# Patient Record
Sex: FEMALE | Race: BLACK OR AFRICAN AMERICAN | NOT HISPANIC OR LATINO | Employment: OTHER | RURAL
[De-identification: names, ages, dates, MRNs, and addresses within clinical notes are randomized per-mention and may not be internally consistent; named-entity substitution may affect disease eponyms.]

---

## 2021-01-06 ENCOUNTER — HISTORICAL (OUTPATIENT)
Dept: ADMINISTRATIVE | Facility: HOSPITAL | Age: 56
End: 2021-01-06

## 2021-01-06 LAB — SARS-COV+SARS-COV-2 AG RESP QL IA.RAPID: NEGATIVE

## 2021-01-25 ENCOUNTER — HISTORICAL (OUTPATIENT)
Dept: ADMINISTRATIVE | Facility: HOSPITAL | Age: 56
End: 2021-01-25

## 2021-01-25 LAB — SARS-COV+SARS-COV-2 AG RESP QL IA.RAPID: NEGATIVE

## 2021-06-22 DIAGNOSIS — J45.909 ASTHMA: Primary | ICD-10-CM

## 2021-07-27 VITALS — HEIGHT: 62 IN | WEIGHT: 198 LBS | BODY MASS INDEX: 36.44 KG/M2

## 2021-07-27 RX ORDER — OXYCODONE AND ACETAMINOPHEN 5; 325 MG/1; MG/1
1 TABLET ORAL EVERY 4 HOURS PRN
COMMUNITY
Start: 2021-04-19 | End: 2023-11-15

## 2021-07-27 RX ORDER — DILTIAZEM HYDROCHLORIDE 120 MG/1
1 CAPSULE, EXTENDED RELEASE ORAL DAILY
COMMUNITY
Start: 2021-04-30 | End: 2022-01-21

## 2021-07-27 RX ORDER — METOPROLOL SUCCINATE 50 MG/1
1 TABLET, EXTENDED RELEASE ORAL 2 TIMES DAILY
COMMUNITY
Start: 2021-06-04 | End: 2022-01-21

## 2021-07-27 RX ORDER — ASPIRIN 325 MG
50000 TABLET, DELAYED RELEASE (ENTERIC COATED) ORAL
COMMUNITY
Start: 2021-03-29 | End: 2022-01-21

## 2021-07-27 RX ORDER — GABAPENTIN 100 MG/1
100 CAPSULE ORAL 3 TIMES DAILY
COMMUNITY
Start: 2021-03-23 | End: 2022-09-26 | Stop reason: SDUPTHER

## 2021-07-27 RX ORDER — DICLOFENAC SODIUM 50 MG/1
50 TABLET, DELAYED RELEASE ORAL 2 TIMES DAILY PRN
COMMUNITY
Start: 2021-04-09 | End: 2022-01-21

## 2021-07-27 RX ORDER — TIZANIDINE 4 MG/1
TABLET ORAL
COMMUNITY
Start: 2021-04-09 | End: 2023-11-15

## 2021-07-27 RX ORDER — ALBUTEROL SULFATE 90 UG/1
2 AEROSOL, METERED RESPIRATORY (INHALATION) 4 TIMES DAILY PRN
COMMUNITY
Start: 2021-06-04 | End: 2022-08-01 | Stop reason: SDUPTHER

## 2021-07-27 RX ORDER — ASPIRIN 81 MG/1
81 TABLET ORAL DAILY
COMMUNITY

## 2021-07-27 RX ORDER — NORTRIPTYLINE HYDROCHLORIDE 25 MG/1
25 CAPSULE ORAL NIGHTLY
COMMUNITY
Start: 2021-04-18 | End: 2022-04-27

## 2021-07-27 RX ORDER — LORATADINE 10 MG/1
10 TABLET ORAL DAILY
COMMUNITY
Start: 2021-04-16 | End: 2022-02-04

## 2021-07-27 RX ORDER — CHLORTHALIDONE 25 MG/1
1 TABLET ORAL DAILY
COMMUNITY
Start: 2021-06-08 | End: 2023-11-15 | Stop reason: SDUPTHER

## 2021-07-27 RX ORDER — ALBUTEROL SULFATE 0.83 MG/ML
1 SOLUTION RESPIRATORY (INHALATION)
COMMUNITY
Start: 2021-06-04 | End: 2022-12-06 | Stop reason: SDUPTHER

## 2021-07-27 RX ORDER — AMLODIPINE BESYLATE 10 MG/1
10 TABLET ORAL DAILY
COMMUNITY
Start: 2021-04-18 | End: 2022-01-21

## 2021-07-27 RX ORDER — FLUTICASONE PROPIONATE 50 MCG
SPRAY, SUSPENSION (ML) NASAL
COMMUNITY
Start: 2021-02-02 | End: 2022-05-19 | Stop reason: SDUPTHER

## 2021-07-27 RX ORDER — LECITHIN 1200 MG
2 CAPSULE ORAL 2 TIMES DAILY
COMMUNITY
End: 2022-01-21

## 2021-07-27 RX ORDER — CETIRIZINE HYDROCHLORIDE 10 MG/1
1 TABLET ORAL DAILY PRN
COMMUNITY
Start: 2021-05-11 | End: 2022-07-11 | Stop reason: SDUPTHER

## 2021-07-27 RX ORDER — FLECAINIDE ACETATE 50 MG/1
50 TABLET ORAL EVERY 12 HOURS
COMMUNITY
Start: 2021-04-05 | End: 2022-01-21

## 2021-07-27 RX ORDER — LOSARTAN POTASSIUM 100 MG/1
1 TABLET ORAL DAILY
COMMUNITY
Start: 2021-06-08 | End: 2022-05-13

## 2021-07-27 RX ORDER — MONTELUKAST SODIUM 10 MG/1
1 TABLET ORAL DAILY
COMMUNITY
Start: 2021-06-04 | End: 2022-02-04

## 2021-07-27 RX ORDER — FERROUS SULFATE 324(65)MG
324 TABLET, DELAYED RELEASE (ENTERIC COATED) ORAL DAILY
COMMUNITY
End: 2022-05-19 | Stop reason: SDUPTHER

## 2021-08-04 ENCOUNTER — TELEPHONE (OUTPATIENT)
Dept: PULMONOLOGY | Facility: CLINIC | Age: 56
End: 2021-08-04

## 2021-09-01 ENCOUNTER — TELEPHONE (OUTPATIENT)
Dept: PULMONOLOGY | Facility: CLINIC | Age: 56
End: 2021-09-01

## 2022-01-21 ENCOUNTER — OFFICE VISIT (OUTPATIENT)
Dept: FAMILY MEDICINE | Facility: CLINIC | Age: 57
End: 2022-01-21

## 2022-01-21 VITALS
OXYGEN SATURATION: 100 % | DIASTOLIC BLOOD PRESSURE: 71 MMHG | HEIGHT: 62 IN | RESPIRATION RATE: 19 BRPM | HEART RATE: 76 BPM | WEIGHT: 184.25 LBS | TEMPERATURE: 97 F | BODY MASS INDEX: 33.9 KG/M2 | SYSTOLIC BLOOD PRESSURE: 123 MMHG

## 2022-01-21 DIAGNOSIS — I10 HYPERTENSION, UNSPECIFIED TYPE: ICD-10-CM

## 2022-01-21 DIAGNOSIS — E11.40 TYPE 2 DIABETES MELLITUS WITH DIABETIC NEUROPATHY, UNSPECIFIED WHETHER LONG TERM INSULIN USE: Primary | ICD-10-CM

## 2022-01-21 DIAGNOSIS — M06.9 RHEUMATOID ARTHRITIS, INVOLVING UNSPECIFIED SITE, UNSPECIFIED WHETHER RHEUMATOID FACTOR PRESENT: ICD-10-CM

## 2022-01-21 DIAGNOSIS — G62.9 NEUROPATHY: ICD-10-CM

## 2022-01-21 DIAGNOSIS — G47.33 OSA (OBSTRUCTIVE SLEEP APNEA): ICD-10-CM

## 2022-01-21 LAB
ALBUMIN SERPL BCP-MCNC: 3.4 G/DL (ref 3.5–5)
ALBUMIN/GLOB SERPL: 1 {RATIO}
ALP SERPL-CCNC: 94 U/L (ref 46–118)
ALT SERPL W P-5'-P-CCNC: 38 U/L (ref 13–56)
ANION GAP SERPL CALCULATED.3IONS-SCNC: 9 MMOL/L (ref 7–16)
AST SERPL W P-5'-P-CCNC: 34 U/L (ref 15–37)
BILIRUB SERPL-MCNC: 0.3 MG/DL (ref 0–1.2)
BUN SERPL-MCNC: 16 MG/DL (ref 7–18)
BUN/CREAT SERPL: 15 (ref 6–20)
CALCIUM SERPL-MCNC: 9.3 MG/DL (ref 8.5–10.1)
CHLORIDE SERPL-SCNC: 103 MMOL/L (ref 98–107)
CHOLEST SERPL-MCNC: 220 MG/DL (ref 0–200)
CHOLEST/HDLC SERPL: 5.4 {RATIO}
CO2 SERPL-SCNC: 32 MMOL/L (ref 21–32)
CREAT SERPL-MCNC: 1.04 MG/DL (ref 0.55–1.02)
GLOBULIN SER-MCNC: 3.4 G/DL (ref 2–4)
GLUCOSE SERPL-MCNC: 109 MG/DL (ref 74–106)
HDLC SERPL-MCNC: 41 MG/DL (ref 40–60)
LDLC SERPL CALC-MCNC: 140 MG/DL
LDLC/HDLC SERPL: 3.4 {RATIO}
NONHDLC SERPL-MCNC: 179 MG/DL
POTASSIUM SERPL-SCNC: 3.1 MMOL/L (ref 3.5–5.1)
PROT SERPL-MCNC: 6.8 G/DL (ref 6.4–8.2)
SODIUM SERPL-SCNC: 141 MMOL/L (ref 136–145)
TRIGL SERPL-MCNC: 193 MG/DL (ref 35–150)
VLDLC SERPL-MCNC: 39 MG/DL

## 2022-01-21 PROCEDURE — 80061 LIPID PANEL: CPT | Mod: ,,, | Performed by: CLINICAL MEDICAL LABORATORY

## 2022-01-21 PROCEDURE — 99204 OFFICE O/P NEW MOD 45 MIN: CPT | Mod: ,,, | Performed by: NURSE PRACTITIONER

## 2022-01-21 PROCEDURE — 99204 PR OFFICE/OUTPT VISIT, NEW, LEVL IV, 45-59 MIN: ICD-10-PCS | Mod: ,,, | Performed by: NURSE PRACTITIONER

## 2022-01-21 PROCEDURE — 36415 COLL VENOUS BLD VENIPUNCTURE: CPT | Mod: ,,, | Performed by: CLINICAL MEDICAL LABORATORY

## 2022-01-21 PROCEDURE — 83036 HEMOGLOBIN A1C: ICD-10-PCS | Mod: 90,,, | Performed by: CLINICAL MEDICAL LABORATORY

## 2022-01-21 PROCEDURE — 80053 COMPREHENSIVE METABOLIC PANEL: ICD-10-PCS | Mod: ,,, | Performed by: CLINICAL MEDICAL LABORATORY

## 2022-01-21 PROCEDURE — 80061 LIPID PANEL: ICD-10-PCS | Mod: ,,, | Performed by: CLINICAL MEDICAL LABORATORY

## 2022-01-21 PROCEDURE — 83036 HEMOGLOBIN GLYCOSYLATED A1C: CPT | Mod: 90,,, | Performed by: CLINICAL MEDICAL LABORATORY

## 2022-01-21 PROCEDURE — 80053 COMPREHEN METABOLIC PANEL: CPT | Mod: ,,, | Performed by: CLINICAL MEDICAL LABORATORY

## 2022-01-21 PROCEDURE — 36415 PR COLLECTION VENOUS BLOOD,VENIPUNCTURE: ICD-10-PCS | Mod: ,,, | Performed by: CLINICAL MEDICAL LABORATORY

## 2022-01-21 RX ORDER — CARVEDILOL 6.25 MG/1
1 TABLET ORAL 2 TIMES DAILY
COMMUNITY
Start: 2021-12-26 | End: 2023-11-15

## 2022-01-21 RX ORDER — FLUTICASONE PROPIONATE AND SALMETEROL 250; 50 UG/1; UG/1
POWDER RESPIRATORY (INHALATION)
COMMUNITY
Start: 2021-11-30 | End: 2022-01-21

## 2022-01-21 RX ORDER — HUMAN INSULIN 100 [IU]/ML
INJECTION, SUSPENSION SUBCUTANEOUS
COMMUNITY
Start: 2022-01-10 | End: 2022-02-04 | Stop reason: SDUPTHER

## 2022-01-21 NOTE — PATIENT INSTRUCTIONS
"Patient Education       Low Blood Sugar in People Without Diabetes   The Basics   Written by the doctors and editors at [facility]   What is low blood sugar? -- Low blood sugar is a condition that happens when the level of sugar in a person's blood gets too low. Some symptoms of low blood sugar are mild, such as sweating or feeling hungry. Others are severe, such as passing out. Another word for low blood sugar is "hypoglycemia."  Low blood sugar happens most often in people with diabetes. It is uncommon in people who do not have diabetes. People without diabetes might feel like they have low blood sugar sometimes, but they most likely don't.  What causes low blood sugar in people without diabetes? -- Causes of low blood sugar in people without diabetes include:  · Certain medicines  · Drinking alcohol, especially drinking a lot over a few days  · Certain illnesses that affect the liver or kidneys  · Anorexia nervosa - This is an eating disorder that makes people lose more weight than is healthy.  · Growths or problems in the pancreas - The pancreas is an organ that makes hormones and juices that help the body break down food (figure 1).  · As a side effect of weight loss surgery  · Hormone conditions that some babies are born with  What are the symptoms of low blood sugar? -- Symptoms of low blood sugar can include:  · Sweating or shaking  · Feeling hungry  · Feeling worried  If low blood sugar levels are not treated, severe symptoms can occur. These can include:  · A headache, blurry vision, or feeling dizzy  · Feeling weak or having trouble walking  · Acting confused or not thinking clearly  · Passing out  Some people have symptoms within a few hours of eating a meal. Other people have symptoms when they haven't eaten for many hours.  How do I know if I have low blood sugar? -- To be diagnosed with low blood sugar, you must meet certain conditions. You must:  · Have symptoms of low blood sugar  · Have a low blood " sugar level when you have the symptoms   · Feel better after you eat something that raises your blood sugar level to normal  To check if you meet these conditions, your doctor will do blood tests when you have symptoms of low blood sugar. Your doctor might have you do things to bring on your symptoms so he or she can do the tests. For example, he or she might have you stop eating for a certain amount of time.  After your doctor knows for sure that you get low blood sugar, he or she will look for the cause. To do this, he or she might order tests, including:  · Other blood tests  · A CT scan, MRI scan, or ultrasound - These are imaging tests that can create pictures of the inside of the body.  How is low blood sugar treated? -- Treatment for low blood sugar involves both raising your blood sugar and treating the cause of your low blood sugar.  Your doctor or nurse will teach you how you can raise your blood sugar when it gets low. People usually raise their blood sugar by eating or drinking quick sources of sugar (table 1). Depending on your condition, your doctor might recommend that you carry a quick source of sugar with you at all times in case you need it.  If your blood sugar is so low that you are confused or passing out, and you are not able to eat anything, you will need to be treated with glucagon. Glucagon is a hormone that can quickly raise blood sugar levels and stop severe symptoms. It comes as a shot (figure 2 and picture 1) or a nose spray. If your doctor recommends that you carry glucagon with you, he or she will tell you when and how to use it. Family members should also learn how to give you glucagon. That way a family member can give it to you if you can't do it yourself.  Your doctor will also treat the condition that's causing your low blood sugar, if it can be treated. For example, if a medicine is causing your low blood sugar, your doctor can change or stop your medicine. If you have a growth  "in your pancreas, your doctor might do surgery to remove the growth.  When should I go to a hospital or call for an ambulance? -- A family member or friend should take you to a hospital or call for an ambulance (in the [place] and [place], dial 9-1-1) if you:  · Still have low blood sugar after you have had a quick source of sugar  · Are still confused 15 minutes after being treated with a dose of glucagon  · Have passed out and there is no glucagon nearby  If you have low blood sugar, do not try to drive yourself to the hospital. Driving with low blood sugar can be dangerous. Once you are in the hospital or ambulance, you will be given fluids with sugar into your vein. This treatment will raise your blood sugar level immediately.  All topics are updated as new evidence becomes available and our peer review process is complete.  This topic retrieved from [employer] on: Sep 21, 2021.  Topic 67156 Version 8.0  Release: 29.4.2 - C29.263  © 2021 UpToDate, Inc. and/or its affiliates. All rights reserved.  figure 1: Pancreas     The pancreas is an organ that is found behind the stomach. It makes hormones and juices that help the body break down food.   Graphic 67467 Version 5.0    table 1: Quick sources of sugar to treat low blood sugar  3 or 4 glucose tablets   ½ cup of juice or regular soda (not sugar-free)   2 tablespoons of raisins   4 or 5 saltine crackers   1 tablespoon of sugar   1 tablespoon of honey or corn syrup   6 to 8 hard candies   These sources of sugar act quickly to treat low blood sugar levels.  Graphic 95350 Version 1.0  figure 2: Where to give a glucagon shot     A glucagon shot can be given in the buttock, arm, or thigh (as shown by the shaded areas). If you use a prefilled glucagon "pen," it can also be injected into the lower belly.  Graphic 02567 Version 7.0    picture 1: Glucagon shot     A glucagon kit includes a syringe and needle and a carrying case. Some kits come with the syringe already filled with " a dose of glucagon. Others come with a vial of glucagon powder and instructions for preparing it.  When you are ready to give the shot, hold the syringe at a 90 degree angle to the skin. Stick the needle in the skin and push the plunger down to inject the liquid. Press down lightly on the skin where the shot went in.  If you give another person a glucagon shot, you should then turn them on their side. This is to prevent choking if the person vomits.  Graphic 85573 Version 8.0    Consumer Information Use and Disclaimer   This information is not specific medical advice and does not replace information you receive from your health care provider. This is only a brief summary of general information. It does NOT include all information about conditions, illnesses, injuries, tests, procedures, treatments, therapies, discharge instructions or life-style choices that may apply to you. You must talk with your health care provider for complete information about your health and treatment options. This information should not be used to decide whether or not to accept your health care provider's advice, instructions or recommendations. Only your health care provider has the knowledge and training to provide advice that is right for you. The use of this information is governed by the [employer] End User License Agreement, available at https://www.[email or web address]/solutions/Scandlines/about/libertad.The use of [employer] content is governed by the [employer] Terms of Use. ©2021 UpToDate, Inc. All rights reserved.  Copyright   © 2021 UpToDate, Inc. and/or its affiliates. All rights reserved.  Patient Education       Diabetes and Diet   The Basics   Written by the doctors and editors at [employer]   Why is diet important in diabetes? -- Diet is important because it is part of diabetes treatment. Many people need to change what they eat and how much they eat to help treat their diabetes. It is important for people to treat their diabetes so  "that they:  · Keep their blood sugar at or near a normal level  · Prevent long-term problems, such as heart or kidney problems, that can happen in people with diabetes  Changing your diet can also help treat obesity, high blood pressure, and high cholesterol. These conditions can affect people with diabetes and can lead to future problems, such as heart attacks or strokes.  Who will work with me to change my diet? -- Your doctor or nurse will work with you to make a food plan to change your diet. They might also recommend that you work with a "dietitian." A dietitian is an expert on food and eating.  Do I need to eat at the same times every day? -- When and how often you should eat depends, in part, on the diabetes medicines you take. For example:  · People who take about the same amount of insulin at the same time each day (called a "fixed regimen") should eat meals at the same times. This is also true for people who take pills that increase insulin levels, such as sulfonylureas. Eating meals at the same time every day helps prevent low blood sugar.  · People who adjust the dose and timing of their insulin each day (called a "flexible regimen") do not always have to eat meals at the same time. That's because they can time their insulin dose for before they plan to eat, and also adjust the dose for how much they plan to eat.  · People who take medicines that don't usually cause low blood sugar, such as metformin, don't have to eat meals at the same time every day.  What do I need to think about when planning what to eat? -- Our bodies break down the food we eat into small pieces called carbohydrates, proteins, and fats.  When planning what to eat, people with diabetes need to think about:  · Carbohydrates (or "carbs") - Carbohydrates, which are sugars that our bodies use for energy, can raise a person's blood sugar level. Your doctor, nurse, or dietitian will tell you how many carbohydrates you should eat at each " "meal or snack. Foods that have carbohydrates include:  ? Bread, pasta, and rice  ? Vegetables and fruits  ? Dairy foods  ? Foods and drinks with added sugar  It is best to get your carbohydrates from fruits, vegetables, whole grains, and low-fat milk. It is best to avoid drinks with added sugar, like soda, juices, and sports drinks.   · Protein - Your doctor, nurse, or dietitian will tell you how much protein you should eat each day. It is best to eat lean meats, fish, eggs, beans, peas, soy products, nuts, and seeds.  · Fats - The type of fat you eat is more important than the amount of fat. "Saturated" and "trans" fats can increase your risk for heart problems, like a heart attack.  ? Foods that have saturated fats include meat, butter, cheese, and ice cream.  ? Foods that have trans fats include processed food with "partially hydrogenated oils" on the ingredient list. This may include fried foods, store bought cookies, muffins, pies, and cakes.  "Monounsaturated" and "polyunsaturated" fats are better for you. Foods with these types of fat include fish, avocado, olive oil, and nuts.  · Calories - People need to eat a certain amount of calories each day to keep their weight the same. People who are overweight and want to lose weight need to eat fewer calories each day.  · Fiber - Eating foods with a lot of fiber can help control a person's blood sugar level. Foods that have a lot of fiber include apples, green beans, peas, beans, lentils, nuts, oatmeal, and whole grains.  · Salt - People who have high blood pressure should not eat foods that contain a lot of salt (also called sodium). People with high blood pressure should also eat healthy foods, such as fruits, vegetables, and low-fat dairy foods.  · Alcohol - Having more than 1 drink (for women) or 2 drinks (for men) a day can raise blood sugar levels. Also, drinks that have fruit juice or soda in them can raise blood sugar levels.  What can I do if I need to " lose weight? -- If you need to lose weight, you can:  · Exercise - Try to get at least 30 minutes of physical activity a day, most days of the week. Even gentle exercise, like walking, is good for your health. Some people with diabetes need to change their medicine dose before they exercise. They might also need to check their blood sugar levels before and after exercising.  · Eat fewer calories - Your doctor, nurse, or dietitian can tell you how many calories you should eat each day in order to lose weight.  If you are worried about your weight, size, or shape, talk with your doctor, nurse, or dietitian. They can help you make changes to improve your health.  Can I eat the same foods as my family? -- Yes. You do not need to eat special foods if you have diabetes. You and your family can eat the same foods. Changing your diet is mostly about eating healthy foods and not eating too much.  What are the other parts of diabetes treatment? -- Besides changing your diet, the other parts of diabetes treatment are:  · Exercise  · Medicines  Some people with diabetes need to learn how to match their diet and exercise with their medicine dose. For example, people who use insulin might need to choose the dose of insulin they give themselves. To choose their dose, they need to think about:  · What they plan to eat at the next meal  · How much exercise they plan to do  · What their blood sugar level is  If the diet and exercise do not match the medicine dose, a person's blood sugar level can get too low or too high. Blood sugar levels that are too low or too high can cause problems.  All topics are updated as new evidence becomes available and our peer review process is complete.  This topic retrieved from [employer] on: Sep 21, 2021.  Topic 09389 Version 7.0  Release: 29.4.2 - C29.263  © 2021 UpToDate, Inc. and/or its affiliates. All rights reserved.  Consumer Information Use and Disclaimer   This information is not specific  medical advice and does not replace information you receive from your health care provider. This is only a brief summary of general information. It does NOT include all information about conditions, illnesses, injuries, tests, procedures, treatments, therapies, discharge instructions or life-style choices that may apply to you. You must talk with your health care provider for complete information about your health and treatment options. This information should not be used to decide whether or not to accept your health care provider's advice, instructions or recommendations. Only your health care provider has the knowledge and training to provide advice that is right for you. The use of this information is governed by the [employer] End User License Agreement, available at https://www.[email or web address]/en/solutions/arcbazar.com/about/libertad.The use of [employer] content is governed by the [employer] Terms of Use. ©2021 UpToDate, Inc. All rights reserved.  Copyright   © 2021 UpToDate, Inc. and/or its affiliates. All rights reserved.

## 2022-01-21 NOTE — PROGRESS NOTES
ROGELIO Pacheco   [facility]      PATIENT NAME: Jana Lopez  : 1965  DATE: 22  MRN: [id number]      Patient PCP Information     Provider PCP Type    Adri FOFANA ROG[facility]IO [facility]          Reason for Visit / Chief Complaint: Diabetes (Pt follow up with diabetes and hospital follow up )     History of Present Illness / Problem Focused Workflow     Jana Lopez presents to the clinic with Diabetes (Pt follow up with diabetes and hospital follow up )     HPI   Hospitalized 2022. Patient was on steroids for RA. Glucose 1000 on admission. Patient hydrated tx with SSI  DC home on 70/30 BID.    Patient has been off Prednisone since a week before she was admitted to hospital.   Patient checking accuchecks at home. Currently on 70/30 25 units every am 20 units every pm   200 this am, 193 this pm.   Records requested from hospitalization   Cardiologist Dr Susanna TEJADA  Rhe[facility]  Dr Yañez eye appt schedule for 2022  [facility]    Review of Systems     Review of Systems   Constitutional: Negative.    HENT: Negative.    Eyes: Positive for visual disturbance.   Respiratory: Positive for cough.    Cardiovascular: Negative.    Gastrointestinal: Negative.    Endocrine: Negative.    Genitourinary: Negative.    Musculoskeletal: Positive for myalgias.   Skin: Negative.    Allergic/Immunologic: Negative.    Neurological: Positive for numbness.   Hematological: Negative.    Psychiatric/Behavioral: Negative.        Medical / Social / Family History     Past Medical History:   Diagnosis Date    Asthma     Dyslipidemia     Hypertension     HIRA (obstructive sleep apnea)     Rheumatoid arthritis     SVT (supraventricular tachycardia)     followed at Dr.S.Hailey MALLORY       Past Surgical History:   Procedure Laterality Date    CHOLECYSTECTOMY      HYSTERECTOMY      LITHOTRIPSY         Social History  Ms.  reports that she has never smoked. She has never used smokeless tobacco. She reports  previous alcohol use. She reports that she does not use drugs.    Family History  MsKartik's family history includes Diabetes in her other; Heart disease in her father and mother; Hypertension in her daughter and father; Stroke in her other.    Medications and Allergies     Medications  Outpatient Medications Marked as Taking for the 1/21/22 encounter (Office Visit) with ROGELIO Pacheco   Medication Sig Dispense Refill    albuterol (PROVENTIL) 2.5 mg /3 mL (0.083 %) nebulizer solution Take 1 vial by nebulization every 4 to 6 hours as needed.      albuterol (PROVENTIL/VENTOLIN HFA) 90 mcg/actuation inhaler Inhale 2 puffs into the lungs 4 (four) times daily as needed.      aspirin (ECOTRIN) 81 MG EC tablet Take 81 mg by mouth once daily.      carvediloL (COREG) 6.25 MG tablet Take 1 tablet by mouth 2 (two) times a day.      cetirizine (ZYRTEC) 10 MG tablet Take 1 tablet by mouth daily as needed.      chlorthalidone (HYGROTEN) 25 MG Tab Take 1 tablet by mouth once daily.      ferrous sulfate 324 mg (65 mg iron) TbEC Take 324 mg by mouth once daily.      fluticasone propionate (FLONASE) 50 mcg/actuation nasal spray USE 1 2 SPRAYS IN EACH NOSTRIL EVERY DAY AS NEEDED      gabapentin (NEURONTIN) 100 MG capsule Take 100 mg by mouth 3 (three) times daily.      loratadine (CLARITIN) 10 mg tablet Take 10 mg by mouth once daily.      losartan (COZAAR) 100 MG tablet Take 1 tablet by mouth once daily.      montelukast (SINGULAIR) 10 mg tablet Take 1 tablet by mouth once daily.      potassium &magnesium aspartate (MAG ASPART-POTASSIUM ASPART ORAL) Take 1 capsule by mouth once daily.      tiZANidine (ZANAFLEX) 4 MG tablet TAKE 1 TABLET BY MOUTH EVERY 6 8 HOURS AS NEEDED         Allergies  Review of patient's allergies indicates:   Allergen Reactions    Lisinopril Other (See Comments)     cough       Physical Examination     Vitals:    01/21/22 1527   BP: 123/71   BP Location: Right arm   Patient Position: Sitting  "  Pulse: 76   Resp: 19   Temp: 97.3 °F (36.3 °C)   SpO2: 100%   Weight: 83.6 kg (184 lb 4 oz)   Height: 5' 2" (1.575 m)     DIABETIC FOOT EXAM:  normal DP and PT pulses, no trophic changes or ulcerative lesions, normal sensory exam and normal monofilament exam,   nail exam normal nails without lesions      Physical Exam  Vitals reviewed.   Constitutional:       Appearance: Normal appearance. She is obese.   HENT:      Head: Normocephalic.      Right Ear: External ear normal.      Left Ear: External ear normal.      Nose: Nose normal.      Mouth/Throat:      Mouth: Mucous membranes are moist.   Eyes:      Extraocular Movements: Extraocular movements intact.      Conjunctiva/sclera: Conjunctivae normal.      Pupils: Pupils are equal, round, and reactive to light.      Comments: Arcus senilis   Cardiovascular:      Rate and Rhythm: Normal rate and regular rhythm.      Pulses: Normal pulses.      Heart sounds: Normal heart sounds.   Pulmonary:      Effort: Pulmonary effort is normal.      Breath sounds: Normal breath sounds.   Abdominal:      Palpations: Abdomen is soft.   Musculoskeletal:         General: Normal range of motion.      Cervical back: Normal range of motion.   Skin:     General: Skin is warm and dry.      Capillary Refill: Capillary refill takes less than 2 seconds.   Neurological:      General: No focal deficit present.      Mental Status: She is alert and oriented to person, place, and time.      Sensory: Sensory deficit present.      Comments: Neuropathy reported. Sensation evaluation intact   Psychiatric:         Mood and Affect: Mood normal.         Behavior: Behavior normal.         Thought Content: Thought content normal.         Judgment: Judgment normal.           No visits with results within 14 Day(s) from this visit.   Latest known visit with results is:   Historical on 01/25/2021   Component Date Value Ref Range Status    COVID-19 Ag 01/25/2021 Negative  Negative Final    Comment: Negative " SARS-CoV results should not be used as the sole basis fortreatment or patient management decisions; negative results should beconsidered in the context of a patient's recent exposures, history andthe presence of clinical signs and symptoms   consistent withCOVID-19.Negative results should be treated as presumptive and confirmedby molecular assay, if necessary for patient management.The above 1 analytes were performed by [facility] [id number] [place] [address]               Assessment and Plan (including Health Maintenance)     Plan:   Type 2 diabetes mellitus with diabetic neuropathy, unspecified whether long term insulin use  -     Comprehensive Metabolic Panel; Future; Expected date: 01/21/2022  -     Lipid Panel; Future; Expected date: 01/21/2022  -     Hemoglobin A1C; Future; Expected date: 01/21/2022  -     Increase 70/30 pm dose to 20 units, keep 25 units every am  -     Ada diet  -     Log glucose readings at home. Bring to next appointment     Rheumatoid arthritis, involving unspecified site, unspecified whether rheumatoid factor present        -    Follow up with Dr Urrutia in March  Or earlier if able         -    Request records from [facility]     Hypertension, unspecified type/Hx of SVT  -continue htn and rate control meds as prescribed.   -Request records from [facility]- Dr Mullins    Neuropathy  - patient has been off gabapentin. Restart 100 mg TID   -neuropathy precautions discussed     HIRA  -1 mo wo wearing machine. Tubing issues. Patient to call [employer] for evaluation of machine.   Has seen Dr Pathak in past.      >45 min spent on intake, discussion of history, evaluation, review of meds and discussion of treatment plan  New patient to establish  Care   There are no Patient Instructions on file for this visit.       Health Maintenance Due   Topic Date Due    Hepatitis C Screening  Never done    Lipid Panel  Never done    COVID-19 Vaccine (1) Never done    HIV Screening   Never done    TETANUS VACCINE  Never done    Mammogram  Never done    Colorectal Cancer Screening  Never done    Shingles Vaccine (1 of 2) Never done    Influenza Vaccine (1) Never done         There is no immunization history on file for this patient.     Problem List Items Addressed This Visit        Orthopedic    Rheumatoid arthritis      Other Visit Diagnoses     Type 2 diabetes mellitus with diabetic neuropathy, unspecified whether long term insulin use    -  Primary    Relevant Medications    NOVOLIN 70-30 FLEXPEN U-100 100 unit/mL (70-30) InPn pen    Other Relevant Orders    Comprehensive Metabolic Panel    Lipid Panel    Hemoglobin A1C    Hypertension, unspecified type        Neuropathy        HIRA (obstructive sleep apnea)              The patient has no Health Maintenance topics of status Not Due    No future appointments.         Signature:  ROGELIO Pacheco  [facility]     Date of encounter: 1/21/22

## 2022-01-25 LAB — HBA1C MFR BLD: 14.7 % (ref 4–5.6)

## 2022-01-25 RX ORDER — ATORVASTATIN CALCIUM 10 MG/1
10 TABLET, FILM COATED ORAL NIGHTLY
Qty: 90 TABLET | Refills: 3 | Status: SHIPPED | OUTPATIENT
Start: 2022-01-25 | End: 2022-01-27 | Stop reason: CLARIF

## 2022-01-27 RX ORDER — SIMVASTATIN 10 MG/1
10 TABLET, FILM COATED ORAL NIGHTLY
Qty: 90 TABLET | Refills: 3 | Status: SHIPPED | OUTPATIENT
Start: 2022-01-27 | End: 2023-11-15 | Stop reason: SDUPTHER

## 2022-01-27 NOTE — PROGRESS NOTES
Patient atorvstatin high on cost. Switched to simvastatin. Patient to notify NP if not affordable.

## 2022-02-04 ENCOUNTER — OFFICE VISIT (OUTPATIENT)
Dept: FAMILY MEDICINE | Facility: CLINIC | Age: 57
End: 2022-02-04
Payer: COMMERCIAL

## 2022-02-04 VITALS
TEMPERATURE: 98 F | OXYGEN SATURATION: 99 % | SYSTOLIC BLOOD PRESSURE: 129 MMHG | RESPIRATION RATE: 18 BRPM | DIASTOLIC BLOOD PRESSURE: 84 MMHG | HEART RATE: 83 BPM | BODY MASS INDEX: 34.44 KG/M2 | HEIGHT: 62 IN | WEIGHT: 187.13 LBS

## 2022-02-04 DIAGNOSIS — F32.A DEPRESSION, UNSPECIFIED DEPRESSION TYPE: ICD-10-CM

## 2022-02-04 DIAGNOSIS — E11.40 CONTROLLED TYPE 2 DIABETES MELLITUS WITH DIABETIC NEUROPATHY, WITH LONG-TERM CURRENT USE OF INSULIN: Primary | ICD-10-CM

## 2022-02-04 DIAGNOSIS — E78.5 HYPERLIPIDEMIA, UNSPECIFIED HYPERLIPIDEMIA TYPE: ICD-10-CM

## 2022-02-04 DIAGNOSIS — Z79.4 CONTROLLED TYPE 2 DIABETES MELLITUS WITH DIABETIC NEUROPATHY, WITH LONG-TERM CURRENT USE OF INSULIN: Primary | ICD-10-CM

## 2022-02-04 DIAGNOSIS — I10 HYPERTENSION, UNSPECIFIED TYPE: ICD-10-CM

## 2022-02-04 PROCEDURE — 3008F PR BODY MASS INDEX (BMI) DOCUMENTED: ICD-10-PCS | Mod: CPTII,,, | Performed by: NURSE PRACTITIONER

## 2022-02-04 PROCEDURE — 99213 PR OFFICE/OUTPT VISIT, EST, LEVL III, 20-29 MIN: ICD-10-PCS | Mod: ,,, | Performed by: NURSE PRACTITIONER

## 2022-02-04 PROCEDURE — 1159F MED LIST DOCD IN RCRD: CPT | Mod: CPTII,,, | Performed by: NURSE PRACTITIONER

## 2022-02-04 PROCEDURE — 3074F PR MOST RECENT SYSTOLIC BLOOD PRESSURE < 130 MM HG: ICD-10-PCS | Mod: CPTII,,, | Performed by: NURSE PRACTITIONER

## 2022-02-04 PROCEDURE — 3079F DIAST BP 80-89 MM HG: CPT | Mod: CPTII,,, | Performed by: NURSE PRACTITIONER

## 2022-02-04 PROCEDURE — 4010F ACE/ARB THERAPY RXD/TAKEN: CPT | Mod: CPTII,,, | Performed by: NURSE PRACTITIONER

## 2022-02-04 PROCEDURE — 3008F BODY MASS INDEX DOCD: CPT | Mod: CPTII,,, | Performed by: NURSE PRACTITIONER

## 2022-02-04 PROCEDURE — 3079F PR MOST RECENT DIASTOLIC BLOOD PRESSURE 80-89 MM HG: ICD-10-PCS | Mod: CPTII,,, | Performed by: NURSE PRACTITIONER

## 2022-02-04 PROCEDURE — 4010F PR ACE/ARB THEARPY RXD/TAKEN: ICD-10-PCS | Mod: CPTII,,, | Performed by: NURSE PRACTITIONER

## 2022-02-04 PROCEDURE — 3074F SYST BP LT 130 MM HG: CPT | Mod: CPTII,,, | Performed by: NURSE PRACTITIONER

## 2022-02-04 PROCEDURE — 1159F PR MEDICATION LIST DOCUMENTED IN MEDICAL RECORD: ICD-10-PCS | Mod: CPTII,,, | Performed by: NURSE PRACTITIONER

## 2022-02-04 PROCEDURE — 99213 OFFICE O/P EST LOW 20 MIN: CPT | Mod: ,,, | Performed by: NURSE PRACTITIONER

## 2022-02-04 RX ORDER — HUMAN INSULIN 100 [IU]/ML
INJECTION, SUSPENSION SUBCUTANEOUS
Qty: 14.1 ML | Refills: 3 | Status: SHIPPED | OUTPATIENT
Start: 2022-02-04 | End: 2022-03-21 | Stop reason: SDUPTHER

## 2022-02-04 RX ORDER — SERTRALINE HYDROCHLORIDE 25 MG/1
25 TABLET, FILM COATED ORAL DAILY
Qty: 30 TABLET | Refills: 11 | Status: SHIPPED | OUTPATIENT
Start: 2022-02-04 | End: 2023-02-28

## 2022-02-04 RX ORDER — NAPROXEN 500 MG/1
500 TABLET ORAL 2 TIMES DAILY PRN
Qty: 30 TABLET | Refills: 1 | Status: SHIPPED | OUTPATIENT
Start: 2022-02-04 | End: 2022-02-28

## 2022-02-04 NOTE — PROGRESS NOTES
ROGELIO Pacheco   [facility]      PATIENT NAME: Jana Lopez  : 1965  DATE: 22  MRN: [id number]      Patient PCP Information     Provider PCP Type    Adri CT ROGELIO Kelly General          Reason for Visit / Chief Complaint: Follow-up       History of Present Illness / Problem Focused Workflow     Jana Lopez presents to the clinic with Follow-up   Rheumatology apt bumped up to , patients pain to joint and head fullness increasing  Patients glucose is improving after review of logs. Lowest 101 Highest 300s average around 180-200   Diabetes  She presents for her follow-up diabetic visit. She has type 2 diabetes mellitus. No MedicAlert identification noted. Hypoglycemia symptoms include headaches. Pertinent negatives for hypoglycemia include no confusion, dizziness, hunger, mood changes, nervousness/anxiousness, pallor, seizures, sleepiness, speech difficulty, sweats or tremors. Associated symptoms include blurred vision, foot paresthesias and visual change. Pertinent negatives for diabetes include no chest pain, no fatigue, no foot ulcerations, no polydipsia, no polyphagia, no polyuria, no weakness and no weight loss. Pertinent negatives for hypoglycemia complications include no blackouts, no hospitalization, no nocturnal hypoglycemia, no required assistance and no required glucagon injection. Symptoms are stable. Diabetic complications include PVD and retinopathy. Pertinent negatives for diabetic complications include no autonomic neuropathy, CVA, heart disease, nephropathy or peripheral neuropathy. Risk factors for coronary artery disease include dyslipidemia, hypertension, obesity and stress. Current diabetic treatment includes diet and insulin injections. She is compliant with treatment all of the time. She is currently taking insulin pre-breakfast and pre-dinner. Insulin injections are given by patient and sibling. Rotation sites for injection include the abdominal wall  and arms. Her weight is stable. She is following a generally healthy, low fat/cholesterol and low salt diet. Meal planning includes avoidance of concentrated sweets and carbohydrate counting. She has not had a previous visit with a dietitian. She rarely participates in exercise. She monitors blood glucose at home 1-2 x per day. She monitors urine at home <1 x per month. Blood glucose monitoring compliance is excellent. Her home blood glucose trend is decreasing steadily. She does not see a podiatrist.Eye exam is current.       Review of Systems     Review of Systems   Constitutional: Negative for activity change, appetite change, chills, diaphoresis, fatigue, fever, unexpected weight change and weight loss.   HENT: Negative for congestion, ear pain, facial swelling, hearing loss, nosebleeds and sore throat.    Eyes: Positive for blurred vision.   Respiratory: Negative for apnea, cough, shortness of breath and wheezing.    Cardiovascular: Negative for chest pain, palpitations and leg swelling.   Gastrointestinal: Negative for abdominal distention, abdominal pain, blood in stool, constipation, diarrhea and nausea.   Endocrine: Negative for cold intolerance, heat intolerance, polydipsia, polyphagia and polyuria.   Genitourinary: Negative for decreased urine volume, difficulty urinating, dysuria, flank pain, frequency, hematuria and urgency.   Musculoskeletal: Positive for arthralgias. Negative for joint swelling and myalgias.   Skin: Negative for color change, pallor and rash.   Neurological: Positive for numbness and headaches. Negative for dizziness, tremors, seizures, syncope, facial asymmetry, speech difficulty, weakness and light-headedness.   Hematological: Negative for adenopathy. Does not bruise/bleed easily.   Psychiatric/Behavioral: Positive for sleep disturbance. Negative for behavioral problems and confusion. The patient is not nervous/anxious.         Depressed        Medical / Social / Family History      Past Medical History:   Diagnosis Date    Asthma     Dyslipidemia     Hypertension     HIRA (obstructive sleep apnea)     Rheumatoid arthritis     SVT (supraventricular tachycardia)     followed at [facility],        Past Surgical History:   Procedure Laterality Date    CHOLECYSTECTOMY      HYSTERECTOMY      LITHOTRIPSY         Social History  Ms.  reports that she has never smoked. She has never used smokeless tobacco. She reports previous alcohol use. She reports that she does not use drugs.    Family History  Ms.'s family history includes Diabetes in her other; Heart disease in her father and mother; Hypertension in her daughter and father; Stroke in her other.    Medications and Allergies     Medications  Outpatient Medications Marked as Taking for the 2/4/22 encounter (Office Visit) with ROGELIO Pacheco   Medication Sig Dispense Refill    albuterol (PROVENTIL) 2.5 mg /3 mL (0.083 %) nebulizer solution Take 1 vial by nebulization every 4 to 6 hours as needed.      albuterol (PROVENTIL/VENTOLIN HFA) 90 mcg/actuation inhaler Inhale 2 puffs into the lungs 4 (four) times daily as needed.      aspirin (ECOTRIN) 81 MG EC tablet Take 81 mg by mouth once daily.      carvediloL (COREG) 6.25 MG tablet Take 1 tablet by mouth 2 (two) times a day.      cetirizine (ZYRTEC) 10 MG tablet Take 1 tablet by mouth daily as needed.      chlorthalidone (HYGROTEN) 25 MG Tab Take 1 tablet by mouth once daily.      ferrous sulfate 324 mg (65 mg iron) TbEC Take 324 mg by mouth once daily.      fluticasone propionate (FLONASE) 50 mcg/actuation nasal spray USE 1 2 SPRAYS IN EACH NOSTRIL EVERY DAY AS NEEDED      gabapentin (NEURONTIN) 100 MG capsule Take 100 mg by mouth 3 (three) times daily.      losartan (COZAAR) 100 MG tablet Take 1 tablet by mouth once daily.      NOVOLIN 70-30 FLEXPEN U-100 100 unit/mL (70-30) InPn pen Inject 27 Units into the skin every morning AND 20 Units every evening. 14.1 mL 3     "potassium &magnesium aspartate (MAG ASPART-POTASSIUM ASPART ORAL) Take 1 capsule by mouth once daily.      simvastatin (ZOCOR) 10 MG tablet Take 1 tablet (10 mg total) by mouth every evening. 90 tablet 3       Allergies  Review of patient's allergies indicates:   Allergen Reactions    Lisinopril Other (See Comments)     cough       Physical Examination     Vitals:    02/04/22 1540 02/04/22 1541   BP: (!) 138/90 129/84   BP Location: Left arm Right arm   Patient Position: Sitting Sitting   Pulse: 83    Resp: 18    Temp: 98.3 °F (36.8 °C)    SpO2: 99%    Weight: 84.9 kg (187 lb 2 oz)    Height: 5' 2" (1.575 m)      PHQ-9:    Over the last 2 weeks, how often have you been bothered by any of the following problems?    1.  Little interest or pleasure in doing things: Nearly every day           = 3   2.  Feeling down, depressed or hopeless: Nearly every day           = 3   3.  Trouble falling or staying asleep, or sleeping too much: More than half of days  = 2   4.  Feeling tired or having little energy: Several days                = 1   5.  Poor appetite or overeating: Several days                = 1   6.  Feeling bad about yourself - or that you are a failure or have let yourself or your family down: Nearly every day           = 3   7.  Trouble concentrating on things, such as reading the newspaper or watching television: Several days                = 1   8.  Moving or speaking so slowly that other people could have noticed.  Or the opposite - being fidgety or restless that you have been moving around a lot more than usual: Several days                = 1   9.  Thoughts that you would be better off dead, or of hurting yourself: Nearly every day           = 3    PHQ-9 Total:  18   No thoughts of harming self of plan. Patient does feel she would be better of dead at times due to recent illnesses  Difficultly finding purpose in current state of illness   Total Score  0-4: None  5-9: Mild  10-14: Moderate  15-19: " Moderately Severe  20-27: Severe      Physical Exam  Vitals reviewed.   Constitutional:       Appearance: Normal appearance.   HENT:      Head: Normocephalic.      Right Ear: External ear normal.      Left Ear: External ear normal.      Mouth/Throat:      Mouth: Mucous membranes are moist.   Eyes:      Extraocular Movements: Extraocular movements intact.   Cardiovascular:      Rate and Rhythm: Normal rate.      Pulses: Normal pulses.      Heart sounds: Normal heart sounds.   Pulmonary:      Effort: Pulmonary effort is normal.      Breath sounds: Normal breath sounds.   Abdominal:      Palpations: Abdomen is soft.   Musculoskeletal:         General: Normal range of motion.      Cervical back: Normal range of motion.   Skin:     General: Skin is warm and dry.      Capillary Refill: Capillary refill takes less than 2 seconds.   Neurological:      General: No focal deficit present.      Mental Status: She is alert and oriented to person, place, and time.   Psychiatric:         Mood and Affect: Mood normal.         Behavior: Behavior normal.         Thought Content: Thought content normal.         Judgment: Judgment normal.           No visits with results within 14 Day(s) from this visit.   Latest known visit with results is:   Office Visit on 01/21/2022   Component Date Value Ref Range Status    Sodium 01/21/2022 141  136 - 145 mmol/L Final    Potassium 01/21/2022 3.1* 3.5 - 5.1 mmol/L Final    Chloride 01/21/2022 103  98 - 107 mmol/L Final    CO2 01/21/2022 32  21 - 32 mmol/L Final    Anion Gap 01/21/2022 9  7 - 16 mmol/L Final    Glucose 01/21/2022 109* 74 - 106 mg/dL Final    BUN 01/21/2022 16  7 - 18 mg/dL Final    Creatinine 01/21/2022 1.04* 0.55 - 1.02 mg/dL Final    BUN/Creatinine Ratio 01/21/2022 15  6 - 20 Final    Calcium 01/21/2022 9.3  8.5 - 10.1 mg/dL Final    Total Protein 01/21/2022 6.8  6.4 - 8.2 g/dL Final    Albumin 01/21/2022 3.4* 3.5 - 5.0 g/dL Final    Globulin 01/21/2022 3.4  2.0 -  4.0 g/dL Final    A/G Ratio 01/21/2022 1.0   Final    Bilirubin, Total 01/21/2022 0.3  0.0 - 1.2 mg/dL Final    Alk Phos 01/21/2022 94  46 - 118 U/L Final    ALT 01/21/2022 38  13 - 56 U/L Final    AST 01/21/2022 34  15 - 37 U/L Final    eGFR 01/21/2022 58* >=60 mL/min/1.73m² Final    Triglycerides 01/21/2022 193* 35 - 150 mg/dL Final      Normal:  <150 mg/dL  Borderline High: 150-199 mg/dL  High:   200-499 mg/dL  Very High:  >=500    Cholesterol 01/21/2022 220* 0 - 200 mg/dL Final      <200 mg/dL:  Desirable  200-240 mg/dL: Borderline High  >240 mg/dL:  High    HDL Cholesterol 01/21/2022 41  40 - 60 mg/dL Final      <40 mg/dL: Low HDL  40-60 mg/dL: Normal  >60 mg/dL: Desirable    Cholesterol/HDL Ratio (Risk Factor) 01/21/2022 5.4   Final    Non-HDL 01/21/2022 179  mg/dL Final    LDL Calculated 01/21/2022 140  mg/dL Final    Unable to calculate due to one of the following values:  Cholesterol <5  HDL Cholesterol <5  Triglycerides <10 or >400    LDL/HDL 01/21/2022 3.4   Final    Unable to calculate due to one of the following values:  Cholesterol <5  HDL Cholesterol <5  Triglycerides <10 or >400    VLDL 01/21/2022 39  mg/dL Final    Hemoglobin A1c 01/21/2022 14.7* 4.0 - 5.6 % Final    Hemoglobin A1c values greater than or equal to 6.5 percent  are diagnostic for diabetes mellitus.  Diagnosis should be   confirmed by repeat testing.  In diabetic patients, HbA1c   goals should be discussed with healthcare provider.     Test Performed by:  [facility] - [address] [address]  : Bruno Zazueta M.D. Ph.D.; CLIA# [id number]             Assessment and Plan (including Health Maintenance)         Plan:   Controlled type 2 diabetes mellitus with diabetic neuropathy, with long-term current use of insulin  -     NOVOLIN 70-30 FLEXPEN U-100 100 unit/mL (70-30) InPn pen; Inject 27 Units into the skin every morning AND 20 Units every evening.   Dispense: 14.1 mL; Refill: 3  Increased am dose by 2 units     Hypertension, unspecified type  -bp controlled continue home meds    Hyperlipidemia, unspecified hyperlipidemia type  -continue statin    Depression, unspecified depression type  PHQ9 remains elevated after restarting home TC antidepressant. Patient reports no improvement. Wean TCA. Will start zoloft once wean complete in approx 2 weeks. Trevor handout given to patient for psychotherapy eval   -     sertraline (ZOLOFT) 25 MG tablet; Take 1 tablet (25 mg total) by mouth once daily.  Dispense: 30 tablet; Refill: 11    Rheumatoid Arthritis  -     naproxen (NAPROSYN) 500 MG tablet; Take 1 tablet (500 mg total) by mouth 2 (two) times daily as needed (joint pain).  Dispense: 30 tablet; Refill: 1       -      Follow up with Rheumatology as scheduled     RTC in 1 mo for repeat eval     There are no Patient Instructions on file for this visit.       Health Maintenance Due   Topic Date Due    Hepatitis C Screening  Never done    COVID-19 Vaccine (1) Never done    HIV Screening  Never done    TETANUS VACCINE  Never done    Mammogram  Never done    Colorectal Cancer Screening  Never done    Shingles Vaccine (1 of 2) Never done    Influenza Vaccine (1) Never done         There is no immunization history on file for this patient.     Problem List Items Addressed This Visit    None     Visit Diagnoses     Controlled type 2 diabetes mellitus with diabetic neuropathy, with long-term current use of insulin    -  Primary    Relevant Medications    NOVOLIN 70-30 FLEXPEN U-100 100 unit/mL (70-30) InPn pen    Hypertension, unspecified type        Hyperlipidemia, unspecified hyperlipidemia type        Depression, unspecified depression type              Health Maintenance Topics with due status: Not Due       Topic Last Completion Date    Lipid Panel 01/21/2022       Future Appointments   Date Time Provider Department Center   3/4/2022  3:30 PM ROGELIO Pacheco  [facility]            Signature:  ROGELIO Pacheco  [facility]     Date of encounter: 2/4/22

## 2022-03-04 ENCOUNTER — OFFICE VISIT (OUTPATIENT)
Dept: FAMILY MEDICINE | Facility: CLINIC | Age: 57
End: 2022-03-04
Payer: COMMERCIAL

## 2022-03-04 VITALS
BODY MASS INDEX: 35.01 KG/M2 | SYSTOLIC BLOOD PRESSURE: 138 MMHG | HEART RATE: 82 BPM | WEIGHT: 190.25 LBS | HEIGHT: 62 IN | DIASTOLIC BLOOD PRESSURE: 79 MMHG | TEMPERATURE: 98 F | RESPIRATION RATE: 18 BRPM | OXYGEN SATURATION: 97 %

## 2022-03-04 DIAGNOSIS — E11.9 TYPE 2 DIABETES MELLITUS WITHOUT COMPLICATION, WITH LONG-TERM CURRENT USE OF INSULIN: ICD-10-CM

## 2022-03-04 DIAGNOSIS — M06.9 RHEUMATOID ARTHRITIS INVOLVING MULTIPLE SITES, UNSPECIFIED WHETHER RHEUMATOID FACTOR PRESENT: Primary | ICD-10-CM

## 2022-03-04 DIAGNOSIS — I10 HYPERTENSION, UNSPECIFIED TYPE: ICD-10-CM

## 2022-03-04 DIAGNOSIS — Z79.4 TYPE 2 DIABETES MELLITUS WITHOUT COMPLICATION, WITH LONG-TERM CURRENT USE OF INSULIN: ICD-10-CM

## 2022-03-04 PROCEDURE — 3078F PR MOST RECENT DIASTOLIC BLOOD PRESSURE < 80 MM HG: ICD-10-PCS | Mod: CPTII,,, | Performed by: NURSE PRACTITIONER

## 2022-03-04 PROCEDURE — 4010F PR ACE/ARB THEARPY RXD/TAKEN: ICD-10-PCS | Mod: CPTII,,, | Performed by: NURSE PRACTITIONER

## 2022-03-04 PROCEDURE — 4010F ACE/ARB THERAPY RXD/TAKEN: CPT | Mod: CPTII,,, | Performed by: NURSE PRACTITIONER

## 2022-03-04 PROCEDURE — 3008F BODY MASS INDEX DOCD: CPT | Mod: CPTII,,, | Performed by: NURSE PRACTITIONER

## 2022-03-04 PROCEDURE — 1159F PR MEDICATION LIST DOCUMENTED IN MEDICAL RECORD: ICD-10-PCS | Mod: CPTII,,, | Performed by: NURSE PRACTITIONER

## 2022-03-04 PROCEDURE — 3078F DIAST BP <80 MM HG: CPT | Mod: CPTII,,, | Performed by: NURSE PRACTITIONER

## 2022-03-04 PROCEDURE — 99212 PR OFFICE/OUTPT VISIT, EST, LEVL II, 10-19 MIN: ICD-10-PCS | Mod: ,,, | Performed by: NURSE PRACTITIONER

## 2022-03-04 PROCEDURE — 3075F SYST BP GE 130 - 139MM HG: CPT | Mod: CPTII,,, | Performed by: NURSE PRACTITIONER

## 2022-03-04 PROCEDURE — 3008F PR BODY MASS INDEX (BMI) DOCUMENTED: ICD-10-PCS | Mod: CPTII,,, | Performed by: NURSE PRACTITIONER

## 2022-03-04 PROCEDURE — 3075F PR MOST RECENT SYSTOLIC BLOOD PRESS GE 130-139MM HG: ICD-10-PCS | Mod: CPTII,,, | Performed by: NURSE PRACTITIONER

## 2022-03-04 PROCEDURE — 1159F MED LIST DOCD IN RCRD: CPT | Mod: CPTII,,, | Performed by: NURSE PRACTITIONER

## 2022-03-04 PROCEDURE — 99212 OFFICE O/P EST SF 10 MIN: CPT | Mod: ,,, | Performed by: NURSE PRACTITIONER

## 2022-03-04 NOTE — PROGRESS NOTES
ROGELIO Pacheco   [facility]      PATIENT NAME: Jana Lopez  : 1965  DATE: 3/4/22  MRN: [id number]      Patient PCP Information     Provider PCP Type    ROGELIO Pacheco General          Reason for Visit / Chief Complaint: Follow-up         History of Present Illness / Problem Focused Workflow     Jana Lopez presents to the clinic with Follow-up     HPI   Ms Lopez is here for follow up today for diabetes and RA. Patient was seen by Rheumatologist. Prescribed Methotrexate and folic acid. Patient has not yet started as she wanted to discuss medication prior to starting. Discussed medications prescribed. Patient has been on 27 units of 70/30 Novolin every am 20 units every pm. Patient did not bring log with her today. However reports accuchecks closer to 120. Patient instructed to bring log if able to next appt.   BP stable. HR stable. Denies polyuria, polydipsia.     Review of Systems     Review of Systems   Constitutional: Negative for activity change and unexpected weight change.   HENT: Negative for hearing loss, rhinorrhea and trouble swallowing.    Eyes: Negative for discharge and visual disturbance.   Respiratory: Negative for chest tightness and wheezing.    Cardiovascular: Negative for chest pain and palpitations.   Gastrointestinal: Negative for blood in stool, constipation, diarrhea and vomiting.   Endocrine: Negative for polydipsia and polyuria.   Genitourinary: Negative for difficulty urinating, dysuria and hematuria.   Musculoskeletal: Positive for arthralgias and neck pain. Negative for joint swelling.   Neurological: Positive for headaches. Negative for weakness.   Psychiatric/Behavioral: Positive for confusion and dysphoric mood.       Medical / Social / Family History     Past Medical History:   Diagnosis Date    Asthma     Dyslipidemia     Hypertension     HIRA (obstructive sleep apnea)     Rheumatoid arthritis     SVT (supraventricular tachycardia)      followed at [facility],        Past Surgical History:   Procedure Laterality Date    CHOLECYSTECTOMY      HYSTERECTOMY      LITHOTRIPSY         Social History  Ms.  reports that she has never smoked. She has never used smokeless tobacco. She reports previous alcohol use. She reports that she does not use drugs.    Family History  Ms.'s family history includes Diabetes in her other; Heart disease in her father and mother; Hypertension in her daughter and father; Stroke in her other.    Medications and Allergies     Medications  Outpatient Medications Marked as Taking for the 3/4/22 encounter (Office Visit) with ROGELIO Pacheco   Medication Sig Dispense Refill    aspirin (ECOTRIN) 81 MG EC tablet Take 81 mg by mouth once daily.      carvediloL (COREG) 6.25 MG tablet Take 1 tablet by mouth 2 (two) times a day.      cetirizine (ZYRTEC) 10 MG tablet Take 1 tablet by mouth daily as needed.      chlorthalidone (HYGROTEN) 25 MG Tab Take 1 tablet by mouth once daily.      ferrous sulfate 324 mg (65 mg iron) TbEC Take 324 mg by mouth once daily.      fluticasone propionate (FLONASE) 50 mcg/actuation nasal spray USE 1 2 SPRAYS IN EACH NOSTRIL EVERY DAY AS NEEDED      folic acid (FOLVITE) 1 MG tablet Take 1 mg by mouth once daily.      gabapentin (NEURONTIN) 100 MG capsule Take 100 mg by mouth 3 (three) times daily.      losartan (COZAAR) 100 MG tablet Take 1 tablet by mouth once daily.      METHOTREXATE ORAL Take by mouth once a week.      NOVOLIN 70-30 FLEXPEN U-100 100 unit/mL (70-30) InPn pen Inject 27 Units into the skin every morning AND 20 Units every evening. 14.1 mL 3    potassium &magnesium aspartate (MAG ASPART-POTASSIUM ASPART ORAL) Take 1 capsule by mouth once daily.      simvastatin (ZOCOR) 10 MG tablet Take 1 tablet (10 mg total) by mouth every evening. 90 tablet 3       Allergies  Review of patient's allergies indicates:   Allergen Reactions    Lisinopril Other (See Comments)     cough  "      Physical Examination     Vitals:    03/04/22 1538   BP: 138/79   BP Location: Right arm   Patient Position: Sitting   Pulse: 82   Resp: 18   Temp: 98 °F (36.7 °C)   SpO2: 97%   Weight: 86.3 kg (190 lb 4 oz)   Height: 5' 2" (1.575 m)       Physical Exam  Vitals reviewed.   Constitutional:       Appearance: Normal appearance.   HENT:      Head: Normocephalic.      Right Ear: External ear normal.      Left Ear: External ear normal.      Nose: Nose normal.      Mouth/Throat:      Mouth: Mucous membranes are moist.   Eyes:      Extraocular Movements: Extraocular movements intact.   Cardiovascular:      Rate and Rhythm: Normal rate and regular rhythm.      Pulses: Normal pulses.      Heart sounds: Normal heart sounds.   Pulmonary:      Effort: Pulmonary effort is normal. No respiratory distress.      Breath sounds: Normal breath sounds. No stridor. No wheezing, rhonchi or rales.   Chest:      Chest wall: No tenderness.   Abdominal:      Palpations: Abdomen is soft.   Musculoskeletal:         General: Normal range of motion.      Cervical back: Normal range of motion.   Skin:     General: Skin is warm and dry.      Capillary Refill: Capillary refill takes less than 2 seconds.   Neurological:      General: No focal deficit present.      Mental Status: She is alert.   Psychiatric:         Mood and Affect: Mood normal.         Behavior: Behavior normal.         Thought Content: Thought content normal.         Judgment: Judgment normal.           No visits with results within 14 Day(s) from this visit.   Latest known visit with results is:   Office Visit on 01/21/2022   Component Date Value Ref Range Status    Sodium 01/21/2022 141  136 - 145 mmol/L Final    Potassium 01/21/2022 3.1 (A) 3.5 - 5.1 mmol/L Final    Chloride 01/21/2022 103  98 - 107 mmol/L Final    CO2 01/21/2022 32  21 - 32 mmol/L Final    Anion Gap 01/21/2022 9  7 - 16 mmol/L Final    Glucose 01/21/2022 109 (A) 74 - 106 mg/dL Final    BUN 01/21/2022 " 16  7 - 18 mg/dL Final    Creatinine 01/21/2022 1.04 (A) 0.55 - 1.02 mg/dL Final    BUN/Creatinine Ratio 01/21/2022 15  6 - 20 Final    Calcium 01/21/2022 9.3  8.5 - 10.1 mg/dL Final    Total Protein 01/21/2022 6.8  6.4 - 8.2 g/dL Final    Albumin 01/21/2022 3.4 (A) 3.5 - 5.0 g/dL Final    Globulin 01/21/2022 3.4  2.0 - 4.0 g/dL Final    A/G Ratio 01/21/2022 1.0   Final    Bilirubin, Total 01/21/2022 0.3  0.0 - 1.2 mg/dL Final    Alk Phos 01/21/2022 94  46 - 118 U/L Final    ALT 01/21/2022 38  13 - 56 U/L Final    AST 01/21/2022 34  15 - 37 U/L Final    eGFR 01/21/2022 58 (A) >=60 mL/min/1.73m² Final    Triglycerides 01/21/2022 193 (A) 35 - 150 mg/dL Final      Normal:  <150 mg/dL  Borderline High: 150-199 mg/dL  High:   200-499 mg/dL  Very High:  >=500    Cholesterol 01/21/2022 220 (A) 0 - 200 mg/dL Final      <200 mg/dL:  Desirable  200-240 mg/dL: Borderline High  >240 mg/dL:  High    HDL Cholesterol 01/21/2022 41  40 - 60 mg/dL Final      <40 mg/dL: Low HDL  40-60 mg/dL: Normal  >60 mg/dL: Desirable    Cholesterol/HDL Ratio (Risk Factor) 01/21/2022 5.4   Final    Non-HDL 01/21/2022 179  mg/dL Final    LDL Calculated 01/21/2022 140  mg/dL Final    Unable to calculate due to one of the following values:  Cholesterol <5  HDL Cholesterol <5  Triglycerides <10 or >400    LDL/HDL 01/21/2022 3.4   Final    Unable to calculate due to one of the following values:  Cholesterol <5  HDL Cholesterol <5  Triglycerides <10 or >400    VLDL 01/21/2022 39  mg/dL Final    Hemoglobin A1c 01/21/2022 14.7 (A) 4.0 - 5.6 % Final    Hemoglobin A1c values greater than or equal to 6.5 percent  are diagnostic for diabetes mellitus.  Diagnosis should be   confirmed by repeat testing.  In diabetic patients, HbA1c   goals should be discussed with healthcare provider.     Test Performed by:  [facility] [address] [address]  : Bruno Zazueta M.D.  Ph.D.; [facility]# [id number]             Assessment and Plan (including Health Maintenance)         Plan:   Rheumatoid arthritis involving multiple sites, unspecified whether rheumatoid factor present    Type 2 diabetes mellitus without complication, with long-term current use of insulin    Hypertension, unspecified type     Continue current diabetes regimen. Novolin 70/30 27 am and 20 units pm  Patient will start MTX and folic acid as ordered by Rheumatology. Request records.   Follow up in April, will repeat A1C at that time  There are no Patient Instructions on file for this visit.       Health Maintenance Due   Topic Date Due    Hepatitis C Screening  Never done    Hemoglobin A1c  Never done    Diabetes Urine Screening  Never done    COVID-19 Vaccine (1) Never done    Pneumococcal Vaccines (Age 0-64) (1 of 2 - PPSV23) Never done    Foot Exam  Never done    Eye Exam  Never done    HIV Screening  Never done    TETANUS VACCINE  Never done    Mammogram  Never done    Colorectal Cancer Screening  Never done    Shingles Vaccine (1 of 2) Never done    Influenza Vaccine (1) Never done         There is no immunization history on file for this patient.     Problem List Items Addressed This Visit        Orthopedic    Rheumatoid arthritis - Primary      Other Visit Diagnoses     Type 2 diabetes mellitus without complication, with long-term current use of insulin        Hypertension, unspecified type              Health Maintenance Topics with due status: Not Due       Topic Last Completion Date    Lipid Panel 01/21/2022    Low Dose Statin 01/27/2022       Future Appointments   Date Time Provider Department Center   4/29/2022  3:30 PM ROGELIO Pacheco [facility]            Signature:  ROGELIO Pacheco  [facility]     Date of encounter: 3/4/22

## 2022-03-07 RX ORDER — FOLIC ACID 1 MG/1
1 TABLET ORAL 2 TIMES DAILY
COMMUNITY
End: 2024-04-03 | Stop reason: SDUPTHER

## 2022-03-21 RX ORDER — HUMAN INSULIN 100 [IU]/ML
INJECTION, SUSPENSION SUBCUTANEOUS
Qty: 14.1 ML | Refills: 3 | Status: SHIPPED | OUTPATIENT
Start: 2022-03-21 | End: 2022-05-13 | Stop reason: SDUPTHER

## 2022-04-27 ENCOUNTER — HOSPITAL ENCOUNTER (OUTPATIENT)
Dept: RADIOLOGY | Facility: HOSPITAL | Age: 57
Discharge: HOME | End: 2022-04-27
Attending: NURSE PRACTITIONER
Payer: COMMERCIAL

## 2022-04-27 ENCOUNTER — OFFICE VISIT (OUTPATIENT)
Dept: FAMILY MEDICINE | Facility: CLINIC | Age: 57
End: 2022-04-27
Payer: COMMERCIAL

## 2022-04-27 VITALS
HEIGHT: 62 IN | DIASTOLIC BLOOD PRESSURE: 85 MMHG | HEART RATE: 69 BPM | SYSTOLIC BLOOD PRESSURE: 155 MMHG | WEIGHT: 188.81 LBS | TEMPERATURE: 98 F | OXYGEN SATURATION: 98 % | BODY MASS INDEX: 34.74 KG/M2 | RESPIRATION RATE: 18 BRPM

## 2022-04-27 DIAGNOSIS — R05.3 CHRONIC COUGH: ICD-10-CM

## 2022-04-27 DIAGNOSIS — R05.3 CHRONIC COUGH: Primary | ICD-10-CM

## 2022-04-27 DIAGNOSIS — I10 HYPERTENSION, UNSPECIFIED TYPE: ICD-10-CM

## 2022-04-27 PROBLEM — M19.90 INFLAMMATORY ARTHRITIS: Status: ACTIVE | Noted: 2022-01-21

## 2022-04-27 PROCEDURE — 99213 PR OFFICE/OUTPT VISIT, EST, LEVL III, 20-29 MIN: ICD-10-PCS | Mod: ,,, | Performed by: NURSE PRACTITIONER

## 2022-04-27 PROCEDURE — 1159F PR MEDICATION LIST DOCUMENTED IN MEDICAL RECORD: ICD-10-PCS | Mod: CPTII,,, | Performed by: NURSE PRACTITIONER

## 2022-04-27 PROCEDURE — 3077F SYST BP >= 140 MM HG: CPT | Mod: CPTII,,, | Performed by: NURSE PRACTITIONER

## 2022-04-27 PROCEDURE — 3008F PR BODY MASS INDEX (BMI) DOCUMENTED: ICD-10-PCS | Mod: CPTII,,, | Performed by: NURSE PRACTITIONER

## 2022-04-27 PROCEDURE — 4010F PR ACE/ARB THEARPY RXD/TAKEN: ICD-10-PCS | Mod: CPTII,,, | Performed by: NURSE PRACTITIONER

## 2022-04-27 PROCEDURE — 3008F BODY MASS INDEX DOCD: CPT | Mod: CPTII,,, | Performed by: NURSE PRACTITIONER

## 2022-04-27 PROCEDURE — 99213 OFFICE O/P EST LOW 20 MIN: CPT | Mod: ,,, | Performed by: NURSE PRACTITIONER

## 2022-04-27 PROCEDURE — 71046 XR CHEST PA AND LATERAL: ICD-10-PCS | Mod: 26,,, | Performed by: RADIOLOGY

## 2022-04-27 PROCEDURE — 1159F MED LIST DOCD IN RCRD: CPT | Mod: CPTII,,, | Performed by: NURSE PRACTITIONER

## 2022-04-27 PROCEDURE — 3077F PR MOST RECENT SYSTOLIC BLOOD PRESSURE >= 140 MM HG: ICD-10-PCS | Mod: CPTII,,, | Performed by: NURSE PRACTITIONER

## 2022-04-27 PROCEDURE — 4010F ACE/ARB THERAPY RXD/TAKEN: CPT | Mod: CPTII,,, | Performed by: NURSE PRACTITIONER

## 2022-04-27 PROCEDURE — 3079F PR MOST RECENT DIASTOLIC BLOOD PRESSURE 80-89 MM HG: ICD-10-PCS | Mod: CPTII,,, | Performed by: NURSE PRACTITIONER

## 2022-04-27 PROCEDURE — 3079F DIAST BP 80-89 MM HG: CPT | Mod: CPTII,,, | Performed by: NURSE PRACTITIONER

## 2022-04-27 PROCEDURE — 71046 X-RAY EXAM CHEST 2 VIEWS: CPT | Mod: TC

## 2022-04-27 PROCEDURE — 71046 X-RAY EXAM CHEST 2 VIEWS: CPT | Mod: 26,,, | Performed by: RADIOLOGY

## 2022-04-27 RX ORDER — CLONIDINE HYDROCHLORIDE 0.1 MG/1
0.1 TABLET ORAL DAILY
Qty: 30 TABLET | Refills: 11 | Status: SHIPPED | OUTPATIENT
Start: 2022-04-27 | End: 2023-11-15

## 2022-04-27 RX ORDER — PANTOPRAZOLE SODIUM 40 MG/1
40 TABLET, DELAYED RELEASE ORAL NIGHTLY
Qty: 30 TABLET | Refills: 11 | Status: SHIPPED | OUTPATIENT
Start: 2022-04-27 | End: 2022-09-26

## 2022-04-27 NOTE — PROGRESS NOTES
ROGELIO Pacheco   [facility]      PATIENT NAME: Jana Lopez  : 1965  DATE: 22  MRN: [id number]      Patient PCP Information     Provider PCP Type    ROGELIO Pacheco General          Reason for Visit / Chief Complaint: Cough and Nasal Congestion (Room 5//  Productive cough and runny nose x3 weeks.)       History of Present Illness / Problem Focused Workflow     Jana Lopez presents to the clinic with Cough and Nasal Congestion (Room 5//  Productive cough and runny nose x3 weeks.)     HPI   Patient here for persistent cough for last 3 weeks. Productive yellow or brown sputum. Worse during day. Not improved with cough drops  Has cpap for hira, wears 3-4 times weekly. States mask dries mouth out.  Denies fever or chills. Uses albuterol inhaler prn which seems to help some.   Joint Pain improved some on mtx, however for 4 days then returns for 3 days with weekly mtx dose  Pain mainly in knees and elbows. Also feels pressure or tightness to face near last 3 days before mtx due as well.   Accuchecks having been stable  per patient     Review of Systems     Review of Systems   Constitutional: Negative.    HENT: Negative.    Eyes: Negative.    Respiratory: Positive for cough.    Cardiovascular: Negative.    Endocrine: Negative.    Genitourinary: Negative.    Musculoskeletal: Positive for myalgias.   Skin: Negative.    Allergic/Immunologic: Negative.    Hematological: Negative.    Psychiatric/Behavioral: Negative.        Medical / Social / Family History     Past Medical History:   Diagnosis Date    Asthma     Dyslipidemia     Hypertension     HIRA (obstructive sleep apnea)     Rheumatoid arthritis     SVT (supraventricular tachycardia)     followed at [facility],        Past Surgical History:   Procedure Laterality Date    CHOLECYSTECTOMY      HYSTERECTOMY      LITHOTRIPSY         Social History  Ms.  reports that she has never smoked. She has never used smokeless  tobacco. She reports previous alcohol use. She reports that she does not use drugs.    Family History  MsKartik's family history includes Diabetes in her other; Heart disease in her father and mother; Hypertension in her daughter and father; Stroke in her other.    Medications and Allergies     Medications  Outpatient Medications Marked as Taking for the 4/27/22 encounter (Office Visit) with ROGELIO Pacheco   Medication Sig Dispense Refill    albuterol (PROVENTIL) 2.5 mg /3 mL (0.083 %) nebulizer solution Take 1 vial by nebulization every 4 to 6 hours as needed.      albuterol (PROVENTIL/VENTOLIN HFA) 90 mcg/actuation inhaler Inhale 2 puffs into the lungs 4 (four) times daily as needed.      aspirin (ECOTRIN) 81 MG EC tablet Take 81 mg by mouth once daily.      carvediloL (COREG) 6.25 MG tablet Take 1 tablet by mouth 2 (two) times a day.      cetirizine (ZYRTEC) 10 MG tablet Take 1 tablet by mouth daily as needed.      chlorthalidone (HYGROTEN) 25 MG Tab Take 1 tablet by mouth once daily.      ferrous sulfate 324 mg (65 mg iron) TbEC Take 324 mg by mouth once daily.      fluticasone propionate (FLONASE) 50 mcg/actuation nasal spray USE 1 2 SPRAYS IN EACH NOSTRIL EVERY DAY AS NEEDED      folic acid (FOLVITE) 1 MG tablet Take 1 mg by mouth once daily.      gabapentin (NEURONTIN) 100 MG capsule Take 100 mg by mouth 3 (three) times daily.      losartan (COZAAR) 100 MG tablet Take 1 tablet by mouth once daily.      METHOTREXATE ORAL Take by mouth once a week.      NOVOLIN 70-30 FLEXPEN U-100 100 unit/mL (70-30) InPn pen Inject 27 Units into the skin every morning AND 20 Units every evening. 14.1 mL 3    oxyCODONE-acetaminophen (PERCOCET) 5-325 mg per tablet Take 1 tablet by mouth every 4 (four) hours as needed. for pain.      potassium &magnesium aspartate (MAG ASPART-POTASSIUM ASPART ORAL) Take 1 capsule by mouth once daily.      sertraline (ZOLOFT) 25 MG tablet Take 1 tablet (25 mg total) by mouth once  "daily. 30 tablet 11    simvastatin (ZOCOR) 10 MG tablet Take 1 tablet (10 mg total) by mouth every evening. 90 tablet 3    tiZANidine (ZANAFLEX) 4 MG tablet TAKE 1 TABLET BY MOUTH EVERY 6 8 HOURS AS NEEDED         Allergies  Review of patient's allergies indicates:   Allergen Reactions    Lisinopril Other (See Comments)     cough       Physical Examination     Vitals:    04/27/22 0828   BP: (!) 155/85   Pulse: 69   Resp: 18   Temp: 98.1 °F (36.7 °C)   TempSrc: Oral   SpO2: 98%   Weight: 85.6 kg (188 lb 12.8 oz)   Height: 5' 2" (1.575 m)       Physical Exam  Vitals reviewed.   Constitutional:       Appearance: Normal appearance.   HENT:      Head: Normocephalic.      Right Ear: External ear normal.      Left Ear: External ear normal.      Nose: Nose normal.      Mouth/Throat:      Mouth: Mucous membranes are moist.   Eyes:      Extraocular Movements: Extraocular movements intact.   Cardiovascular:      Rate and Rhythm: Normal rate and regular rhythm.      Pulses: Normal pulses.      Heart sounds: Normal heart sounds.   Pulmonary:      Effort: Pulmonary effort is normal. No respiratory distress.      Breath sounds: Normal breath sounds. No stridor. No wheezing, rhonchi or rales.   Chest:      Chest wall: No tenderness.   Abdominal:      Palpations: Abdomen is soft.   Musculoskeletal:         General: Normal range of motion.      Cervical back: Normal range of motion.   Skin:     General: Skin is warm and dry.      Capillary Refill: Capillary refill takes less than 2 seconds.   Neurological:      General: No focal deficit present.      Mental Status: She is alert and oriented to person, place, and time.   Psychiatric:         Mood and Affect: Mood normal.         Behavior: Behavior normal.         Thought Content: Thought content normal.         Judgment: Judgment normal.           No visits with results within 14 Day(s) from this visit.   Latest known visit with results is:   Office Visit on 01/21/2022   Component " Date Value Ref Range Status    Sodium 01/21/2022 141  136 - 145 mmol/L Final    Potassium 01/21/2022 3.1 (A) 3.5 - 5.1 mmol/L Final    Chloride 01/21/2022 103  98 - 107 mmol/L Final    CO2 01/21/2022 32  21 - 32 mmol/L Final    Anion Gap 01/21/2022 9  7 - 16 mmol/L Final    Glucose 01/21/2022 109 (A) 74 - 106 mg/dL Final    BUN 01/21/2022 16  7 - 18 mg/dL Final    Creatinine 01/21/2022 1.04 (A) 0.55 - 1.02 mg/dL Final    BUN/Creatinine Ratio 01/21/2022 15  6 - 20 Final    Calcium 01/21/2022 9.3  8.5 - 10.1 mg/dL Final    Total Protein 01/21/2022 6.8  6.4 - 8.2 g/dL Final    Albumin 01/21/2022 3.4 (A) 3.5 - 5.0 g/dL Final    Globulin 01/21/2022 3.4  2.0 - 4.0 g/dL Final    A/G Ratio 01/21/2022 1.0   Final    Bilirubin, Total 01/21/2022 0.3  0.0 - 1.2 mg/dL Final    Alk Phos 01/21/2022 94  46 - 118 U/L Final    ALT 01/21/2022 38  13 - 56 U/L Final    AST 01/21/2022 34  15 - 37 U/L Final    eGFR 01/21/2022 58 (A) >=60 mL/min/1.73m² Final    Triglycerides 01/21/2022 193 (A) 35 - 150 mg/dL Final      Normal:  <150 mg/dL  Borderline High: 150-199 mg/dL  High:   200-499 mg/dL  Very High:  >=500    Cholesterol 01/21/2022 220 (A) 0 - 200 mg/dL Final      <200 mg/dL:  Desirable  200-240 mg/dL: Borderline High  >240 mg/dL:  High    HDL Cholesterol 01/21/2022 41  40 - 60 mg/dL Final      <40 mg/dL: Low HDL  40-60 mg/dL: Normal  >60 mg/dL: Desirable    Cholesterol/HDL Ratio (Risk Factor) 01/21/2022 5.4   Final    Non-HDL 01/21/2022 179  mg/dL Final    LDL Calculated 01/21/2022 140  mg/dL Final    Unable to calculate due to one of the following values:  Cholesterol <5  HDL Cholesterol <5  Triglycerides <10 or >400    LDL/HDL 01/21/2022 3.4   Final    Unable to calculate due to one of the following values:  Cholesterol <5  HDL Cholesterol <5  Triglycerides <10 or >400    VLDL 01/21/2022 39  mg/dL Final    Hemoglobin A1c 01/21/2022 14.7 (A) 4.0 - 5.6 % Final    Hemoglobin A1c values greater than or equal  to 6.5 percent  are diagnostic for diabetes mellitus.  Diagnosis should be   confirmed by repeat testing.  In diabetic patients, HbA1c   goals should be discussed with healthcare provider.     Test Performed by:  [facility] - [address] [address]  : Bruno Zazueta M.D. Ph.D.; CLIA# [id number]          Brenden VILLA Geraldo,   [phone] 4/27/2022 Routine     Narrative & Impression  EXAMINATION:  XR CHEST PA AND LATERAL     CLINICAL HISTORY:  Chronic cough     COMPARISON:  Chest x-ray October 16, 2019     TECHNIQUE:  Frontal and lateral views of the chest.     FINDINGS:  The cardiomediastinal silhouette is stable in configuration.  Chronic change of the lungs without focal consolidation, pleural effusion, or pneumothorax.  Visualized osseous and surrounding soft tissue structures appear grossly unchanged.  Surgical clips project over the right upper quadrant of the abdomen.     Impression:     No acute cardiopulmonary process demonstrated.     Point of Service: [facility]        Electronically signed by: Brenden Chow  Date:                                            04/27/2022  Time:                                           10:01             Exam Ended:                  Assessment and Plan (including Health Maintenance)         Plan:   Chronic cough  -     pantoprazole (PROTONIX) 40 MG tablet; Take 1 tablet (40 mg total) by mouth every evening.  Dispense: 30 tablet; Refill: 11 cover for reflux  -      Continue albuterol inhaler prn  -     X-Ray Chest PA And Lateral; Future; Expected date: 04/27/2022 negative    Hypertension, unspecified type  -     cloNIDine (CATAPRES) 0.1 MG tablet; Take 1 tablet (0.1 mg total) by mouth once daily.  Dispense: 30 tablet; Refill: 11       -      Bp elevated. Additional medication added patient to follow up in 2 weeks for recheck        -     Dash diet continue chlorthalidone, coreg and losartan      Follow up in 2 weeks with blood pressure and glucose log for review, will follow up on cough as well at that time  There are no Patient Instructions on file for this visit.       Health Maintenance Due   Topic Date Due    Hepatitis C Screening  Never done    Hemoglobin A1c  Never done    Diabetes Urine Screening  Never done    COVID-19 Vaccine (1) Never done    Foot Exam  Never done    Eye Exam  Never done    HIV Screening  Never done    TETANUS VACCINE  Never done    Mammogram  Never done    Colorectal Cancer Screening  Never done    Shingles Vaccine (1 of 2) Never done    Influenza Vaccine (1) Never done         There is no immunization history on file for this patient.     Problem List Items Addressed This Visit    None     Visit Diagnoses     Chronic cough    -  Primary    Relevant Medications    pantoprazole (PROTONIX) 40 MG tablet    Other Relevant Orders    X-Ray Chest PA And Lateral (Completed)    Hypertension, unspecified type        Relevant Medications    cloNIDine (CATAPRES) 0.1 MG tablet          Health Maintenance Topics with due status: Not Due       Topic Last Completion Date    Lipid Panel 01/21/2022    Low Dose Statin 04/27/2022       Future Appointments   Date Time Provider Department Center   5/13/2022  8:15 AM ROGELIO Pacheco [facility]            Signature:  ROGELIO Pacheco [facility]     Date of encounter: 4/27/22

## 2022-05-13 ENCOUNTER — OFFICE VISIT (OUTPATIENT)
Dept: FAMILY MEDICINE | Facility: CLINIC | Age: 57
End: 2022-05-13
Payer: COMMERCIAL

## 2022-05-13 VITALS
BODY MASS INDEX: 34.78 KG/M2 | SYSTOLIC BLOOD PRESSURE: 125 MMHG | TEMPERATURE: 99 F | RESPIRATION RATE: 18 BRPM | WEIGHT: 189 LBS | DIASTOLIC BLOOD PRESSURE: 72 MMHG | HEART RATE: 64 BPM | HEIGHT: 62 IN | OXYGEN SATURATION: 97 %

## 2022-05-13 DIAGNOSIS — Z11.4 SCREENING FOR HIV (HUMAN IMMUNODEFICIENCY VIRUS): ICD-10-CM

## 2022-05-13 DIAGNOSIS — Z79.60 LONG-TERM USE OF IMMUNOSUPPRESSANT MEDICATION: ICD-10-CM

## 2022-05-13 DIAGNOSIS — I10 HYPERTENSION, UNSPECIFIED TYPE: ICD-10-CM

## 2022-05-13 DIAGNOSIS — M19.90 INFLAMMATORY ARTHRITIS: ICD-10-CM

## 2022-05-13 DIAGNOSIS — Z11.59 NEED FOR HEPATITIS C SCREENING TEST: ICD-10-CM

## 2022-05-13 DIAGNOSIS — Z79.4 TYPE 2 DIABETES MELLITUS WITHOUT COMPLICATION, WITH LONG-TERM CURRENT USE OF INSULIN: ICD-10-CM

## 2022-05-13 DIAGNOSIS — Z12.11 SCREENING FOR COLON CANCER: Primary | ICD-10-CM

## 2022-05-13 DIAGNOSIS — E11.9 TYPE 2 DIABETES MELLITUS WITHOUT COMPLICATION, WITH LONG-TERM CURRENT USE OF INSULIN: ICD-10-CM

## 2022-05-13 LAB
ANISOCYTOSIS BLD QL SMEAR: NORMAL
BASOPHILS # BLD AUTO: 0.03 K/UL (ref 0–0.2)
BASOPHILS NFR BLD AUTO: 0.5 % (ref 0–1)
BILIRUB SERPL-MCNC: NORMAL MG/DL
BLOOD URINE, POC: NORMAL
COLOR, POC UA: NORMAL
DIFFERENTIAL METHOD BLD: ABNORMAL
EOSINOPHIL # BLD AUTO: 0.41 K/UL (ref 0–0.5)
EOSINOPHIL NFR BLD AUTO: 6.3 % (ref 1–4)
ERYTHROCYTE [DISTWIDTH] IN BLOOD BY AUTOMATED COUNT: 17.2 % (ref 11.5–14.5)
EST. AVERAGE GLUCOSE BLD GHB EST-MCNC: 104 MG/DL
GLUCOSE UR QL STRIP: NORMAL
HBA1C MFR BLD HPLC: 5.7 % (ref 4.5–6.6)
HCT VFR BLD AUTO: 31.4 % (ref 38–47)
HCV AB SER QL: NORMAL
HGB BLD-MCNC: 10.5 G/DL (ref 12–16)
HIV 1+O+2 AB SERPL QL: NORMAL
IMM GRANULOCYTES # BLD AUTO: 0.01 K/UL (ref 0–0.04)
IMM GRANULOCYTES NFR BLD: 0.2 % (ref 0–0.4)
KETONES UR QL STRIP: NORMAL
LEUKOCYTE ESTERASE URINE, POC: NORMAL
LYMPHOCYTES # BLD AUTO: 1.84 K/UL (ref 1–4.8)
LYMPHOCYTES NFR BLD AUTO: 28.3 % (ref 27–41)
MCH RBC QN AUTO: 24.8 PG (ref 27–31)
MCHC RBC AUTO-ENTMCNC: 33.4 G/DL (ref 32–36)
MCV RBC AUTO: 74.1 FL (ref 80–96)
MICROCYTES BLD QL SMEAR: NORMAL
MONOCYTES # BLD AUTO: 0.31 K/UL (ref 0–0.8)
MONOCYTES NFR BLD AUTO: 4.8 % (ref 2–6)
MPC BLD CALC-MCNC: 11.3 FL (ref 9.4–12.4)
NEUTROPHILS # BLD AUTO: 3.9 K/UL (ref 1.8–7.7)
NEUTROPHILS NFR BLD AUTO: 59.9 % (ref 53–65)
NITRITE, POC UA: NORMAL
NRBC # BLD AUTO: 0 X10E3/UL
NRBC, AUTO (.00): 0 %
PH, POC UA: 7
PLATELET # BLD AUTO: 254 K/UL (ref 150–400)
PLATELET MORPHOLOGY: NORMAL
PROTEIN, POC: NORMAL
RBC # BLD AUTO: 4.24 M/UL (ref 4.2–5.4)
SPECIFIC GRAVITY, POC UA: 1.01
UROBILINOGEN, POC UA: 1
WBC # BLD AUTO: 6.5 K/UL (ref 4.5–11)

## 2022-05-13 PROCEDURE — 85025 CBC WITH DIFFERENTIAL: ICD-10-PCS | Mod: ,,, | Performed by: CLINICAL MEDICAL LABORATORY

## 2022-05-13 PROCEDURE — 4010F PR ACE/ARB THEARPY RXD/TAKEN: ICD-10-PCS | Mod: CPTII,,, | Performed by: NURSE PRACTITIONER

## 2022-05-13 PROCEDURE — 81003 POCT URINALYSIS W/O SCOPE: ICD-10-PCS | Mod: QW,,, | Performed by: NURSE PRACTITIONER

## 2022-05-13 PROCEDURE — 81003 URINALYSIS AUTO W/O SCOPE: CPT | Mod: QW,,, | Performed by: NURSE PRACTITIONER

## 2022-05-13 PROCEDURE — 99213 OFFICE O/P EST LOW 20 MIN: CPT | Mod: ,,, | Performed by: NURSE PRACTITIONER

## 2022-05-13 PROCEDURE — 87389 HIV 1 / 2 ANTIBODY: ICD-10-PCS | Mod: ,,, | Performed by: CLINICAL MEDICAL LABORATORY

## 2022-05-13 PROCEDURE — 3074F PR MOST RECENT SYSTOLIC BLOOD PRESSURE < 130 MM HG: ICD-10-PCS | Mod: CPTII,,, | Performed by: NURSE PRACTITIONER

## 2022-05-13 PROCEDURE — 86803 HEPATITIS C AB TEST: CPT | Mod: ,,, | Performed by: CLINICAL MEDICAL LABORATORY

## 2022-05-13 PROCEDURE — 3008F PR BODY MASS INDEX (BMI) DOCUMENTED: ICD-10-PCS | Mod: CPTII,,, | Performed by: NURSE PRACTITIONER

## 2022-05-13 PROCEDURE — 3074F SYST BP LT 130 MM HG: CPT | Mod: CPTII,,, | Performed by: NURSE PRACTITIONER

## 2022-05-13 PROCEDURE — 3008F BODY MASS INDEX DOCD: CPT | Mod: CPTII,,, | Performed by: NURSE PRACTITIONER

## 2022-05-13 PROCEDURE — 85025 COMPLETE CBC W/AUTO DIFF WBC: CPT | Mod: ,,, | Performed by: CLINICAL MEDICAL LABORATORY

## 2022-05-13 PROCEDURE — 4010F ACE/ARB THERAPY RXD/TAKEN: CPT | Mod: CPTII,,, | Performed by: NURSE PRACTITIONER

## 2022-05-13 PROCEDURE — 87389 HIV-1 AG W/HIV-1&-2 AB AG IA: CPT | Mod: ,,, | Performed by: CLINICAL MEDICAL LABORATORY

## 2022-05-13 PROCEDURE — 99213 PR OFFICE/OUTPT VISIT, EST, LEVL III, 20-29 MIN: ICD-10-PCS | Mod: ,,, | Performed by: NURSE PRACTITIONER

## 2022-05-13 PROCEDURE — 83036 HEMOGLOBIN GLYCOSYLATED A1C: CPT | Mod: ,,, | Performed by: CLINICAL MEDICAL LABORATORY

## 2022-05-13 PROCEDURE — 3078F DIAST BP <80 MM HG: CPT | Mod: CPTII,,, | Performed by: NURSE PRACTITIONER

## 2022-05-13 PROCEDURE — 1159F MED LIST DOCD IN RCRD: CPT | Mod: CPTII,,, | Performed by: NURSE PRACTITIONER

## 2022-05-13 PROCEDURE — 1159F PR MEDICATION LIST DOCUMENTED IN MEDICAL RECORD: ICD-10-PCS | Mod: CPTII,,, | Performed by: NURSE PRACTITIONER

## 2022-05-13 PROCEDURE — 83036 HEMOGLOBIN A1C: ICD-10-PCS | Mod: ,,, | Performed by: CLINICAL MEDICAL LABORATORY

## 2022-05-13 PROCEDURE — 3078F PR MOST RECENT DIASTOLIC BLOOD PRESSURE < 80 MM HG: ICD-10-PCS | Mod: CPTII,,, | Performed by: NURSE PRACTITIONER

## 2022-05-13 PROCEDURE — 86803 HEPATITIS C ANTIBODY: ICD-10-PCS | Mod: ,,, | Performed by: CLINICAL MEDICAL LABORATORY

## 2022-05-13 RX ORDER — HUMAN INSULIN 100 [IU]/ML
INJECTION, SUSPENSION SUBCUTANEOUS
Qty: 14.1 ML | Refills: 3 | Status: SHIPPED | OUTPATIENT
Start: 2022-05-13 | End: 2022-09-20

## 2022-05-13 RX ORDER — METHOTREXATE 2.5 MG/1
10 TABLET ORAL
COMMUNITY
Start: 2022-04-21

## 2022-05-13 NOTE — PROGRESS NOTES
ROGELIO Pacheco   [facility]      PATIENT NAME: Jana Lopez  : 1965  DATE: 22  MRN: [id number]      Patient PCP Information     Provider PCP Type    ROGELIO Pacheco General          Reason for Visit / Chief Complaint: Follow-up         History of Present Illness / Problem Focused Workflow     Jana Lopez presents to the clinic with Follow-up     HPI   Ms Lopez is here today for follow up for diabetes, htn, cough and inflammatory arthritis.   Currently followed by Dr [facility] on MTX.   Patient states cough has improved since starting protonix for reflux.  Patient brought blood pressure and accucheck log with her. All readings WNL. On .  Patients blood pressure better controlled after adding clonidine to coreg and chlorathalidone. Patient has been holding losartan.      Review of Systems     Review of Systems   Constitutional: Negative for activity change and unexpected weight change.   HENT: Negative for hearing loss, rhinorrhea and trouble swallowing.    Eyes: Negative for discharge and visual disturbance.   Respiratory: Negative for chest tightness and wheezing.    Cardiovascular: Negative for chest pain and palpitations.   Gastrointestinal: Negative for blood in stool, constipation, diarrhea and vomiting.   Endocrine: Negative for polydipsia and polyuria.   Genitourinary: Negative for difficulty urinating, dysuria, hematuria and menstrual problem.   Musculoskeletal: Positive for arthralgias. Negative for joint swelling and neck pain.   Neurological: Positive for headaches. Negative for weakness.   Psychiatric/Behavioral: Negative for confusion and dysphoric mood.       Medical / Social / Family History     Past Medical History:   Diagnosis Date    Asthma     Dyslipidemia     Hypertension     HIRA (obstructive sleep apnea)     Rheumatoid arthritis     SVT (supraventricular tachycardia)     followed at [facility],        Past Surgical History:    Procedure Laterality Date    CHOLECYSTECTOMY      HYSTERECTOMY      LITHOTRIPSY         Social History  Ms.  reports that she has never smoked. She has never used smokeless tobacco. She reports previous alcohol use. She reports that she does not use drugs.    Family History  Ms.'s family history includes Diabetes in her other; Heart disease in her father and mother; Hypertension in her daughter and father; Stroke in her other.    Medications and Allergies     Medications  Outpatient Medications Marked as Taking for the 5/13/22 encounter (Office Visit) with ROGELIO Pacheco   Medication Sig Dispense Refill    albuterol (PROVENTIL) 2.5 mg /3 mL (0.083 %) nebulizer solution Take 1 vial by nebulization every 4 to 6 hours as needed.      albuterol (PROVENTIL/VENTOLIN HFA) 90 mcg/actuation inhaler Inhale 2 puffs into the lungs 4 (four) times daily as needed.      aspirin (ECOTRIN) 81 MG EC tablet Take 81 mg by mouth once daily.      carvediloL (COREG) 6.25 MG tablet Take 1 tablet by mouth 2 (two) times a day.      cetirizine (ZYRTEC) 10 MG tablet Take 1 tablet by mouth daily as needed.      chlorthalidone (HYGROTEN) 25 MG Tab Take 1 tablet by mouth once daily.      cloNIDine (CATAPRES) 0.1 MG tablet Take 1 tablet (0.1 mg total) by mouth once daily. 30 tablet 11    ferrous sulfate 324 mg (65 mg iron) TbEC Take 324 mg by mouth once daily.      fluticasone propionate (FLONASE) 50 mcg/actuation nasal spray USE 1 2 SPRAYS IN EACH NOSTRIL EVERY DAY AS NEEDED      folic acid (FOLVITE) 1 MG tablet Take 1 mg by mouth once daily.      gabapentin (NEURONTIN) 100 MG capsule Take 100 mg by mouth 3 (three) times daily.      methotrexate 2.5 MG Tab Take by mouth.      NOVOLIN 70-30 FLEXPEN U-100 100 unit/mL (70-30) InPn pen Inject 27 Units into the skin every morning AND 20 Units every evening. 14.1 mL 3    pantoprazole (PROTONIX) 40 MG tablet Take 1 tablet (40 mg total) by mouth every evening. 30 tablet 11     "potassium &magnesium aspartate (MAG ASPART-POTASSIUM ASPART ORAL) Take 1 capsule by mouth once daily.      sertraline (ZOLOFT) 25 MG tablet Take 1 tablet (25 mg total) by mouth once daily. 30 tablet 11    simvastatin (ZOCOR) 10 MG tablet Take 1 tablet (10 mg total) by mouth every evening. 90 tablet 3       Allergies  Review of patient's allergies indicates:   Allergen Reactions    Lisinopril Other (See Comments)     cough       Physical Examination     Vitals:    05/13/22 0812   BP: 125/72   BP Location: Right arm   Patient Position: Sitting   Pulse: 64   Resp: 18   Temp: 98.8 °F (37.1 °C)   SpO2: 97%   Weight: 85.7 kg (189 lb)   Height: 5' 2" (1.575 m)       Physical Exam  Vitals and nursing note reviewed.   Constitutional:       Appearance: Normal appearance. She is normal weight.   HENT:      Head: Normocephalic.      Right Ear: External ear normal.      Left Ear: External ear normal.      Nose: Nose normal.      Mouth/Throat:      Mouth: Mucous membranes are moist.   Eyes:      Extraocular Movements: Extraocular movements intact.      Conjunctiva/sclera: Conjunctivae normal.      Pupils: Pupils are equal, round, and reactive to light.   Cardiovascular:      Rate and Rhythm: Normal rate.      Pulses: Normal pulses.      Heart sounds: Normal heart sounds. No murmur heard.  Pulmonary:      Effort: Pulmonary effort is normal.      Breath sounds: Normal breath sounds. No stridor. No wheezing or rhonchi.   Abdominal:      General: Bowel sounds are normal. There is no distension.      Palpations: Abdomen is soft. There is no mass.      Tenderness: There is no abdominal tenderness.   Musculoskeletal:         General: No swelling or tenderness. Normal range of motion.      Cervical back: Normal range of motion and neck supple.      Right lower leg: No edema.      Left lower leg: No edema.   Skin:     General: Skin is warm and dry.      Capillary Refill: Capillary refill takes less than 2 seconds.   Neurological:      " General: No focal deficit present.      Mental Status: She is alert. Mental status is at baseline.      Cranial Nerves: No cranial nerve deficit.      Sensory: No sensory deficit.      Motor: No weakness.   Psychiatric:         Mood and Affect: Mood normal.         Behavior: Behavior normal.         Thought Content: Thought content normal.         Judgment: Judgment normal.           Office Visit on 05/13/2022   Component Date Value Ref Range Status    Color, UA 05/13/2022 Dark Yellow   Final    Spec Grav UA 05/13/2022 1.015   Final    pH, UA 05/13/2022 7.0   Final    WBC, UA 05/13/2022 neg   Final    Nitrite, UA 05/13/2022 neg   Final    Protein, POC 05/13/2022 neg   Final    Glucose, UA 05/13/2022 neg   Final    Ketones, UA 05/13/2022 neg   Final    Bilirubin, POC 05/13/2022 neg   Final    Urobilinogen, UA 05/13/2022 1.0   Final    Blood, UA 05/13/2022 neg   Final             Assessment and Plan (including Health Maintenance)       Plan:   Screening for colon cancer  -     Ambulatory referral/consult to Gastroenterology; Future; Expected date: 05/20/2022    Type 2 diabetes mellitus without complication, with long-term current use of insulin  -     Hemoglobin A1C; Future; Expected date: 05/13/2022  -     POCT URINALYSIS W/O SCOPE  -    Continue 70/30 as ordered BID    Screening for HIV (human immunodeficiency virus)        -     HIV 1/2 Ag/Ab (4th Gen); Future; Expected date: 05/13/2022    Need for hepatitis C screening test  -     Hepatitis C Antibody; Future; Expected date: 05/13/2022    Inflammatory arthritis  -     CBC Auto Differential; Future; Expected date: 05/13/2022    Long-term use of immunosuppressant medication  -     CBC Auto Differential; Future; Expected date: 05/13/2022     Hypertension       -     continue clonidine, coreg and diuretic    Follow up in 3 mo for htn and dm follow up  Eye appt records and last mammogram report requested see media    Patient Instructions          Health  Maintenance Due   Topic Date Due    Hepatitis C Screening  Never done    Diabetes Urine Screening  Never done    Eye Exam  Never done    HIV Screening  Never done    TETANUS VACCINE  Never done    Mammogram  Never done    Colorectal Cancer Screening  Never done    Shingles Vaccine (1 of 2) Never done    Hemoglobin A1c  05/13/2022       Most Recent Immunizations   Administered Date(s) Administered    COVID-19, MRNA, LN-S, PF (Pfizer) (Purple Cap) 05/12/2022        Problem List Items Addressed This Visit        Orthopedic    Inflammatory arthritis    Relevant Orders    CBC Auto Differential      Other Visit Diagnoses     Screening for colon cancer    -  Primary    Relevant Orders    Ambulatory referral/consult to Gastroenterology    Type 2 diabetes mellitus without complication, with long-term current use of insulin        Relevant Medications    NOVOLIN 70-30 FLEXPEN U-100 100 unit/mL (70-30) InPn pen    Other Relevant Orders    Hemoglobin A1C    POCT URINALYSIS W/O SCOPE (Completed)    Screening for HIV (human immunodeficiency virus)        Need for hepatitis C screening test        Relevant Orders    HIV 1/2 Ag/Ab (4th Gen)    Hepatitis C Antibody    Long-term use of immunosuppressant medication        Relevant Orders    CBC Auto Differential    Hypertension, unspecified type              Health Maintenance Topics with due status: Not Due       Topic Last Completion Date    Foot Exam 01/21/2022    Lipid Panel 01/21/2022    COVID-19 Vaccine 05/12/2022    Low Dose Statin 05/13/2022    Influenza Vaccine Not Due       Future Appointments   Date Time Provider Department Center   8/12/2022  8:30 AM ROGELIO Pacheco [facility]            Signature:  ROGELIO Pacheco  [facility]     Date of encounter: 5/13/22

## 2022-05-19 RX ORDER — FLUTICASONE PROPIONATE 50 MCG
1 SPRAY, SUSPENSION (ML) NASAL DAILY
Qty: 18.2 ML | Refills: 2 | Status: SHIPPED | OUTPATIENT
Start: 2022-05-19 | End: 2022-05-26 | Stop reason: CLARIF

## 2022-05-19 RX ORDER — FERROUS SULFATE 325(65) MG
325 TABLET ORAL 2 TIMES DAILY
Qty: 60 TABLET | Refills: 2 | Status: SHIPPED | OUTPATIENT
Start: 2022-05-19 | End: 2022-08-17

## 2022-05-26 ENCOUNTER — TELEPHONE (OUTPATIENT)
Dept: FAMILY MEDICINE | Facility: CLINIC | Age: 57
End: 2022-05-26
Payer: COMMERCIAL

## 2022-05-26 RX ORDER — MOMETASONE FUROATE 50 UG/1
2 SPRAY, METERED NASAL DAILY
Qty: 17 G | Refills: 2 | Status: SHIPPED | OUTPATIENT
Start: 2022-05-26

## 2022-07-11 NOTE — TELEPHONE ENCOUNTER
----- Message from Deya Kent sent at 7/11/2022 10:35 AM CDT -----  Requested refill on [facility]  Callback no

## 2022-07-12 RX ORDER — CETIRIZINE HYDROCHLORIDE 10 MG/1
10 TABLET ORAL DAILY PRN
Qty: 30 TABLET | Refills: 6 | Status: SHIPPED | OUTPATIENT
Start: 2022-07-12 | End: 2023-02-28

## 2022-07-29 DIAGNOSIS — M19.90 INFLAMMATORY ARTHRITIS: Primary | ICD-10-CM

## 2022-07-29 NOTE — PROGRESS NOTES
Patient [facility] to [facility]. Wishes to transfer care from [facility]

## 2022-08-01 ENCOUNTER — DOCUMENTATION ONLY (OUTPATIENT)
Dept: FAMILY MEDICINE | Facility: CLINIC | Age: 57
End: 2022-08-01
Payer: COMMERCIAL

## 2022-08-01 RX ORDER — ALBUTEROL SULFATE 90 UG/1
2 AEROSOL, METERED RESPIRATORY (INHALATION) 4 TIMES DAILY PRN
Qty: 18 G | Refills: 1 | Status: SHIPPED | OUTPATIENT
Start: 2022-08-01

## 2022-08-01 NOTE — PROGRESS NOTES
Patient requested referral to [facility]. Order placed. However clinic does not accept [employer] patients insurance. Notified patient. Patient verbalized understanding. Patient states she plans on switching insurance. Would like to wait until new insurance approved prior and post pone referral.

## 2022-09-20 RX ORDER — HUMAN INSULIN 100 [IU]/ML
INJECTION, SUSPENSION SUBCUTANEOUS
Qty: 15 ML | Refills: 0 | Status: SHIPPED | OUTPATIENT
Start: 2022-09-20 | End: 2022-10-20

## 2022-09-20 NOTE — TELEPHONE ENCOUNTER
----- Message from Jeni Harvey sent at 9/20/2022  9:08 AM CDT -----  Pt called about getting a refill.         Call back #[phone]

## 2022-09-26 ENCOUNTER — OFFICE VISIT (OUTPATIENT)
Dept: FAMILY MEDICINE | Facility: CLINIC | Age: 57
End: 2022-09-26
Payer: COMMERCIAL

## 2022-09-26 VITALS
SYSTOLIC BLOOD PRESSURE: 126 MMHG | RESPIRATION RATE: 18 BRPM | HEIGHT: 62 IN | BODY MASS INDEX: 35.59 KG/M2 | OXYGEN SATURATION: 96 % | HEART RATE: 68 BPM | WEIGHT: 193.38 LBS | DIASTOLIC BLOOD PRESSURE: 79 MMHG | TEMPERATURE: 98 F

## 2022-09-26 DIAGNOSIS — E11.40 CONTROLLED TYPE 2 DIABETES MELLITUS WITH DIABETIC NEUROPATHY, WITH LONG-TERM CURRENT USE OF INSULIN: ICD-10-CM

## 2022-09-26 DIAGNOSIS — I10 HYPERTENSION, UNSPECIFIED TYPE: ICD-10-CM

## 2022-09-26 DIAGNOSIS — Z79.4 CONTROLLED TYPE 2 DIABETES MELLITUS WITH DIABETIC NEUROPATHY, WITH LONG-TERM CURRENT USE OF INSULIN: ICD-10-CM

## 2022-09-26 DIAGNOSIS — M19.90 INFLAMMATORY ARTHRITIS: Primary | ICD-10-CM

## 2022-09-26 LAB
CREAT UR-MCNC: 134 MG/DL (ref 28–219)
MICROALBUMIN UR-MCNC: <0.5 MG/DL (ref 0–2.8)
MICROALBUMIN/CREAT RATIO PNL UR: <3.7 MG/G (ref 0–30)

## 2022-09-26 PROCEDURE — 4010F ACE/ARB THERAPY RXD/TAKEN: CPT | Mod: CPTII,,, | Performed by: NURSE PRACTITIONER

## 2022-09-26 PROCEDURE — 3074F PR MOST RECENT SYSTOLIC BLOOD PRESSURE < 130 MM HG: ICD-10-PCS | Mod: CPTII,,, | Performed by: NURSE PRACTITIONER

## 2022-09-26 PROCEDURE — 82043 UR ALBUMIN QUANTITATIVE: CPT | Mod: ,,, | Performed by: CLINICAL MEDICAL LABORATORY

## 2022-09-26 PROCEDURE — 3044F HG A1C LEVEL LT 7.0%: CPT | Mod: CPTII,,, | Performed by: NURSE PRACTITIONER

## 2022-09-26 PROCEDURE — 1159F MED LIST DOCD IN RCRD: CPT | Mod: CPTII,,, | Performed by: NURSE PRACTITIONER

## 2022-09-26 PROCEDURE — 3078F DIAST BP <80 MM HG: CPT | Mod: CPTII,,, | Performed by: NURSE PRACTITIONER

## 2022-09-26 PROCEDURE — 99213 OFFICE O/P EST LOW 20 MIN: CPT | Mod: ,,, | Performed by: NURSE PRACTITIONER

## 2022-09-26 PROCEDURE — 3074F SYST BP LT 130 MM HG: CPT | Mod: CPTII,,, | Performed by: NURSE PRACTITIONER

## 2022-09-26 PROCEDURE — 82043 MICROALBUMIN / CREATININE RATIO URINE: ICD-10-PCS | Mod: ,,, | Performed by: CLINICAL MEDICAL LABORATORY

## 2022-09-26 PROCEDURE — 3008F PR BODY MASS INDEX (BMI) DOCUMENTED: ICD-10-PCS | Mod: CPTII,,, | Performed by: NURSE PRACTITIONER

## 2022-09-26 PROCEDURE — 1159F PR MEDICATION LIST DOCUMENTED IN MEDICAL RECORD: ICD-10-PCS | Mod: CPTII,,, | Performed by: NURSE PRACTITIONER

## 2022-09-26 PROCEDURE — 1160F RVW MEDS BY RX/DR IN RCRD: CPT | Mod: CPTII,,, | Performed by: NURSE PRACTITIONER

## 2022-09-26 PROCEDURE — 3008F BODY MASS INDEX DOCD: CPT | Mod: CPTII,,, | Performed by: NURSE PRACTITIONER

## 2022-09-26 PROCEDURE — 3044F PR MOST RECENT HEMOGLOBIN A1C LEVEL <7.0%: ICD-10-PCS | Mod: CPTII,,, | Performed by: NURSE PRACTITIONER

## 2022-09-26 PROCEDURE — 1160F PR REVIEW ALL MEDS BY PRESCRIBER/CLIN PHARMACIST DOCUMENTED: ICD-10-PCS | Mod: CPTII,,, | Performed by: NURSE PRACTITIONER

## 2022-09-26 PROCEDURE — 99213 PR OFFICE/OUTPT VISIT, EST, LEVL III, 20-29 MIN: ICD-10-PCS | Mod: ,,, | Performed by: NURSE PRACTITIONER

## 2022-09-26 PROCEDURE — 82570 MICROALBUMIN / CREATININE RATIO URINE: ICD-10-PCS | Mod: ,,, | Performed by: CLINICAL MEDICAL LABORATORY

## 2022-09-26 PROCEDURE — 4010F PR ACE/ARB THEARPY RXD/TAKEN: ICD-10-PCS | Mod: CPTII,,, | Performed by: NURSE PRACTITIONER

## 2022-09-26 PROCEDURE — 3078F PR MOST RECENT DIASTOLIC BLOOD PRESSURE < 80 MM HG: ICD-10-PCS | Mod: CPTII,,, | Performed by: NURSE PRACTITIONER

## 2022-09-26 PROCEDURE — 82570 ASSAY OF URINE CREATININE: CPT | Mod: ,,, | Performed by: CLINICAL MEDICAL LABORATORY

## 2022-09-26 RX ORDER — OMEPRAZOLE 20 MG/1
20 CAPSULE, DELAYED RELEASE ORAL EVERY MORNING
COMMUNITY
Start: 2022-09-01

## 2022-09-26 RX ORDER — CYCLOSPORINE 0.5 MG/ML
1 EMULSION OPHTHALMIC
COMMUNITY
Start: 2022-09-25 | End: 2024-03-17

## 2022-09-26 RX ORDER — MELOXICAM 15 MG/1
15 TABLET ORAL DAILY
COMMUNITY
Start: 2022-09-01 | End: 2022-12-16

## 2022-09-26 RX ORDER — GABAPENTIN 100 MG/1
100 CAPSULE ORAL 3 TIMES DAILY
Qty: 90 CAPSULE | Refills: 11 | Status: SHIPPED | OUTPATIENT
Start: 2022-09-26

## 2022-09-26 RX ORDER — LOSARTAN POTASSIUM 100 MG/1
100 TABLET ORAL DAILY
COMMUNITY
Start: 2022-08-02 | End: 2023-11-15 | Stop reason: SDUPTHER

## 2022-09-26 NOTE — PROGRESS NOTES
ROGELIO Pacheco   [facility]      PATIENT NAME: Jana Lopez  : 1965  DATE: 22  MRN: [id number]      Patient PCP Information       Provider PCP Type    Adri P ROGELIO [facility]            Reason for Visit / Chief Complaint: Medication Refill (Room 8///) and Pain (Wrist, left/)         History of Present Illness / Problem Focused Workflow     Jana Lopez presents to the clinic with Medication Refill (Room 8///) and Pain (Wrist, left/)    Ms Lopez is here for follow up on Diabetes and HTN. She has prior hx of inflammatory arthritis followed by Dr Urrutia.   Patient states she has been receiving infusions every 4 weeks, cannot recall name of medication. Prev on MTX however believe medication has changed. Patient states insurance has recently denied medication and patient is past due for infusion. Waiting on approval. Patient has noticed increase joint and facial pain since missing scheduled dose. Patient was prescribed mobic for pain. Also started on ppi for chronic cough which patient states has helped.   Blood pressure well controlled. Last A1C stable and patient reports home glucose 100-130 on current regimen.      Medication Refill  Associated symptoms include arthralgias, headaches, neck pain and weakness. Pertinent negatives include no chest pain, joint swelling or vomiting.   Pain  Associated symptoms include arthralgias, headaches, neck pain and weakness. Pertinent negatives include no chest pain, joint swelling or vomiting.     Review of Systems     Review of Systems   Constitutional:  Negative for activity change and unexpected weight change.   HENT:  Negative for hearing loss, rhinorrhea and trouble swallowing.    Eyes:  Positive for visual disturbance. Negative for discharge.   Respiratory:  Negative for chest tightness and wheezing.    Cardiovascular:  Negative for chest pain and palpitations.   Gastrointestinal:  Negative for blood in stool, constipation, diarrhea and  vomiting.   Endocrine: Negative for polydipsia and polyuria.   Genitourinary:  Negative for difficulty urinating, dysuria, hematuria and menstrual problem.   Musculoskeletal:  Positive for arthralgias and neck pain. Negative for joint swelling.   Neurological:  Positive for weakness and headaches.   Psychiatric/Behavioral:  Negative for confusion and dysphoric mood.      Medical / Social / Family History     Past Medical History:   Diagnosis Date    Asthma     Dyslipidemia     Hypertension     HIRA (obstructive sleep apnea)     Rheumatoid arthritis     SVT (supraventricular tachycardia)     followed at [facility],        Past Surgical History:   Procedure Laterality Date    CHOLECYSTECTOMY      HYSTERECTOMY      LITHOTRIPSY         Social History  Ms.  reports that she has never smoked. She has never used smokeless tobacco. She reports that she does not currently use alcohol. She reports that she does not use drugs.    Family History  Ms.'s family history includes Diabetes in an other family member; Heart disease in her father and mother; Hypertension in her daughter and father; Stroke in an other family member.    Medications and Allergies     Medications  Outpatient Medications Marked as Taking for the 9/26/22 encounter (Office Visit) with ROGELIO Pacheco   Medication Sig Dispense Refill    albuterol (PROVENTIL) 2.5 mg /3 mL (0.083 %) nebulizer solution Take 1 vial by nebulization every 4 to 6 hours as needed.      albuterol (PROVENTIL/VENTOLIN HFA) 90 mcg/actuation inhaler Inhale 2 puffs into the lungs 4 (four) times daily as needed for Wheezing. 18 g 1    aspirin (ECOTRIN) 81 MG EC tablet Take 81 mg by mouth once daily.      carvediloL (COREG) 6.25 MG tablet Take 1 tablet by mouth 2 (two) times a day.      chlorthalidone (HYGROTEN) 25 MG Tab Take 1 tablet by mouth once daily.      cloNIDine (CATAPRES) 0.1 MG tablet Take 1 tablet (0.1 mg total) by mouth once daily. 30 tablet 11    cycloSPORINE (RESTASIS)  "0.05 % ophthalmic emulsion Place into both eyes.      folic acid (FOLVITE) 1 MG tablet Take 1 mg by mouth once daily.      methotrexate 2.5 MG Tab Take by mouth.      mometasone (NASONEX) 50 mcg/actuation nasal spray 2 sprays by Nasal route once daily. 17 g 2    NOVOLIN 70-30 FLEXPEN U-100 100 unit/mL (70-30) InPn pen INJECT 27 UNITS UNDER THE SKIN EVERY MORNING AND 20 UNITS EVERY EVENING 15 mL 0    omeprazole (PRILOSEC) 20 MG capsule Take 20 mg by mouth every morning.      sertraline (ZOLOFT) 25 MG tablet Take 1 tablet (25 mg total) by mouth once daily. 30 tablet 11    simvastatin (ZOCOR) 10 MG tablet Take 1 tablet (10 mg total) by mouth every evening. 90 tablet 3    [DISCONTINUED] gabapentin (NEURONTIN) 100 MG capsule Take 100 mg by mouth 3 (three) times daily.         Allergies  Review of patient's allergies indicates:   Allergen Reactions    Lisinopril Other (See Comments)     cough       Physical Examination     Vitals:    09/26/22 1059   BP: 126/79   BP Location: Left arm   Patient Position: Sitting   Pulse: 68   Resp: 18   Temp: 98.4 °F (36.9 °C)   SpO2: 96%   Weight: 87.7 kg (193 lb 6 oz)   Height: 5' 2" (1.575 m)       Physical Exam  Vitals reviewed.   HENT:      Head: Normocephalic.      Right Ear: External ear normal.      Left Ear: External ear normal.      Nose: Nose normal.      Mouth/Throat:      Mouth: Mucous membranes are moist.   Eyes:      Extraocular Movements: Extraocular movements intact.   Cardiovascular:      Rate and Rhythm: Normal rate and regular rhythm.      Pulses: Normal pulses.      Heart sounds: Normal heart sounds.   Pulmonary:      Effort: Pulmonary effort is normal.      Breath sounds: Normal breath sounds.   Abdominal:      Palpations: Abdomen is soft.   Musculoskeletal:      Cervical back: Normal range of motion.      Comments: Slow to ambulate painful joints mainly at elbow wrist and knees    Skin:     General: Skin is warm and dry.      Capillary Refill: Capillary refill takes " less than 2 seconds.   Neurological:      General: No focal deficit present.      Mental Status: She is alert and oriented to person, place, and time.   Psychiatric:         Mood and Affect: Mood normal.         Behavior: Behavior normal.         Thought Content: Thought content normal.         Judgment: Judgment normal.         No visits with results within 14 Day(s) from this visit.   Latest known visit with results is:   Patient Outreach on 05/16/2022   Component Date Value Ref Range Status    BCS Recommendation External 03/26/2021 Repeat mammogram in 2 years   Final             Assessment and Plan (including Health Maintenance)         Plan:   Inflammatory arthritis  -     gabapentin (NEURONTIN) 100 MG capsule; Take 1 capsule (100 mg total) by mouth 3 (three) times daily.  Dispense: 90 capsule; Refill: 11    Controlled type 2 diabetes mellitus with diabetic neuropathy, with long-term current use of insulin  -     Microalbumin/Creatinine Ratio, Urine; Future; Expected date: 09/26/2022    Hypertension, unspecified type   Continue htn and diabetic medications as ordered  RTC in 3 mo for follow up   There are no Patient Instructions on file for this visit.       Health Maintenance Due   Topic Date Due    Diabetes Urine Screening  Never done    Pneumococcal Vaccines (Age 0-64) (1 - PCV) Never done    TETANUS VACCINE  Never done    Shingles Vaccine (1 of 2) Never done    Colorectal Cancer Screening  Never done    COVID-19 Vaccine (4 - Booster for Pfizer series) 07/07/2022    Influenza Vaccine (1) Never done       Most Recent Immunizations   Administered Date(s) Administered    COVID-19, MRNA, LN-S, PF (Pfizer) (Purple Cap) 05/12/2022        Problem List Items Addressed This Visit          Orthopedic    Inflammatory arthritis - Primary    Relevant Medications    gabapentin (NEURONTIN) 100 MG capsule     Other Visit Diagnoses       Controlled type 2 diabetes mellitus with diabetic neuropathy, with long-term current use  of insulin        Relevant Orders    Microalbumin/Creatinine Ratio, Urine    Hypertension, unspecified type                Health Maintenance Topics with due status: Not Due       Topic Last Completion Date    Mammogram 03/26/2021    Foot Exam 01/21/2022    Lipid Panel 01/21/2022    Eye Exam 03/23/2022    Hemoglobin A1c 05/13/2022    Low Dose Statin 09/26/2022       No future appointments.         Signature:  ROGELIO Pacheco  [facility]     Date of encounter: 9/26/22

## 2022-10-14 ENCOUNTER — OFFICE VISIT (OUTPATIENT)
Dept: FAMILY MEDICINE | Facility: CLINIC | Age: 57
End: 2022-10-14
Payer: COMMERCIAL

## 2022-10-14 ENCOUNTER — HOSPITAL ENCOUNTER (OUTPATIENT)
Dept: RADIOLOGY | Facility: HOSPITAL | Age: 57
Discharge: HOME | End: 2022-10-14
Attending: NURSE PRACTITIONER
Payer: COMMERCIAL

## 2022-10-14 VITALS
WEIGHT: 195.13 LBS | SYSTOLIC BLOOD PRESSURE: 111 MMHG | RESPIRATION RATE: 18 BRPM | TEMPERATURE: 98 F | DIASTOLIC BLOOD PRESSURE: 83 MMHG | HEART RATE: 73 BPM | BODY MASS INDEX: 35.91 KG/M2 | HEIGHT: 62 IN | OXYGEN SATURATION: 96 %

## 2022-10-14 DIAGNOSIS — M79.642 LEFT HAND PAIN: Primary | ICD-10-CM

## 2022-10-14 DIAGNOSIS — M19.90 INFLAMMATORY ARTHRITIS: ICD-10-CM

## 2022-10-14 DIAGNOSIS — Z23 FLU VACCINE NEED: ICD-10-CM

## 2022-10-14 DIAGNOSIS — M79.605 LEFT LEG PAIN: ICD-10-CM

## 2022-10-14 DIAGNOSIS — R51.9 PERSISTENT HEADACHES: ICD-10-CM

## 2022-10-14 DIAGNOSIS — R29.898 LEFT ARM WEAKNESS: ICD-10-CM

## 2022-10-14 DIAGNOSIS — R29.898 WEAKNESS OF LEFT LOWER EXTREMITY: ICD-10-CM

## 2022-10-14 DIAGNOSIS — M79.642 LEFT HAND PAIN: ICD-10-CM

## 2022-10-14 PROBLEM — N20.0 KIDNEY STONE: Status: ACTIVE | Noted: 2018-08-23

## 2022-10-14 PROCEDURE — 4010F ACE/ARB THERAPY RXD/TAKEN: CPT | Mod: CPTII,,, | Performed by: NURSE PRACTITIONER

## 2022-10-14 PROCEDURE — 1160F RVW MEDS BY RX/DR IN RCRD: CPT | Mod: CPTII,,, | Performed by: NURSE PRACTITIONER

## 2022-10-14 PROCEDURE — 1160F PR REVIEW ALL MEDS BY PRESCRIBER/CLIN PHARMACIST DOCUMENTED: ICD-10-PCS | Mod: CPTII,,, | Performed by: NURSE PRACTITIONER

## 2022-10-14 PROCEDURE — 3079F DIAST BP 80-89 MM HG: CPT | Mod: CPTII,,, | Performed by: NURSE PRACTITIONER

## 2022-10-14 PROCEDURE — 73100 XR WRIST 2 VIEW LEFT: ICD-10-PCS | Mod: 26,LT,, | Performed by: RADIOLOGY

## 2022-10-14 PROCEDURE — 1159F MED LIST DOCD IN RCRD: CPT | Mod: CPTII,,, | Performed by: NURSE PRACTITIONER

## 2022-10-14 PROCEDURE — 3074F SYST BP LT 130 MM HG: CPT | Mod: CPTII,,, | Performed by: NURSE PRACTITIONER

## 2022-10-14 PROCEDURE — 3044F HG A1C LEVEL LT 7.0%: CPT | Mod: CPTII,,, | Performed by: NURSE PRACTITIONER

## 2022-10-14 PROCEDURE — 90686 FLU VACCINE (QUAD) GREATER THAN OR EQUAL TO 3YO PRESERVATIVE FREE IM: ICD-10-PCS | Mod: ,,, | Performed by: NURSE PRACTITIONER

## 2022-10-14 PROCEDURE — 1159F PR MEDICATION LIST DOCUMENTED IN MEDICAL RECORD: ICD-10-PCS | Mod: CPTII,,, | Performed by: NURSE PRACTITIONER

## 2022-10-14 PROCEDURE — 90471 FLU VACCINE (QUAD) GREATER THAN OR EQUAL TO 3YO PRESERVATIVE FREE IM: ICD-10-PCS | Mod: ,,, | Performed by: NURSE PRACTITIONER

## 2022-10-14 PROCEDURE — 3044F PR MOST RECENT HEMOGLOBIN A1C LEVEL <7.0%: ICD-10-PCS | Mod: CPTII,,, | Performed by: NURSE PRACTITIONER

## 2022-10-14 PROCEDURE — 3074F PR MOST RECENT SYSTOLIC BLOOD PRESSURE < 130 MM HG: ICD-10-PCS | Mod: CPTII,,, | Performed by: NURSE PRACTITIONER

## 2022-10-14 PROCEDURE — 3079F PR MOST RECENT DIASTOLIC BLOOD PRESSURE 80-89 MM HG: ICD-10-PCS | Mod: CPTII,,, | Performed by: NURSE PRACTITIONER

## 2022-10-14 PROCEDURE — 99213 OFFICE O/P EST LOW 20 MIN: CPT | Mod: 25,,, | Performed by: NURSE PRACTITIONER

## 2022-10-14 PROCEDURE — 90686 IIV4 VACC NO PRSV 0.5 ML IM: CPT | Mod: ,,, | Performed by: NURSE PRACTITIONER

## 2022-10-14 PROCEDURE — 3061F PR NEG MICROALBUMINURIA RESULT DOCUMENTED/REVIEW: ICD-10-PCS | Mod: CPTII,,, | Performed by: NURSE PRACTITIONER

## 2022-10-14 PROCEDURE — 99213 PR OFFICE/OUTPT VISIT, EST, LEVL III, 20-29 MIN: ICD-10-PCS | Mod: 25,,, | Performed by: NURSE PRACTITIONER

## 2022-10-14 PROCEDURE — 3066F PR DOCUMENTATION OF TREATMENT FOR NEPHROPATHY: ICD-10-PCS | Mod: CPTII,,, | Performed by: NURSE PRACTITIONER

## 2022-10-14 PROCEDURE — 90471 IMMUNIZATION ADMIN: CPT | Mod: ,,, | Performed by: NURSE PRACTITIONER

## 2022-10-14 PROCEDURE — 3066F NEPHROPATHY DOC TX: CPT | Mod: CPTII,,, | Performed by: NURSE PRACTITIONER

## 2022-10-14 PROCEDURE — 73100 X-RAY EXAM OF WRIST: CPT | Mod: TC,LT

## 2022-10-14 PROCEDURE — 73100 X-RAY EXAM OF WRIST: CPT | Mod: 26,LT,, | Performed by: RADIOLOGY

## 2022-10-14 PROCEDURE — 3061F NEG MICROALBUMINURIA REV: CPT | Mod: CPTII,,, | Performed by: NURSE PRACTITIONER

## 2022-10-14 PROCEDURE — 4010F PR ACE/ARB THEARPY RXD/TAKEN: ICD-10-PCS | Mod: CPTII,,, | Performed by: NURSE PRACTITIONER

## 2022-10-14 RX ORDER — DICLOFENAC SODIUM 10 MG/G
2 GEL TOPICAL 3 TIMES DAILY
Qty: 50 G | Refills: 1 | Status: SHIPPED | OUTPATIENT
Start: 2022-10-14 | End: 2022-12-16

## 2022-10-14 RX ORDER — MAGNESIUM 250 MG
250 TABLET ORAL DAILY
COMMUNITY
End: 2024-02-20

## 2022-10-14 RX ORDER — ASCORBIC ACID 500 MG
TABLET ORAL
COMMUNITY
End: 2023-11-15

## 2022-10-14 RX ORDER — VIT C/E/ZN/COPPR/LUTEIN/ZEAXAN 250MG-90MG
1 CAPSULE ORAL
COMMUNITY
End: 2023-11-15

## 2022-10-14 NOTE — PROGRESS NOTES
ROGELIO Pacheco   [facility]      PATIENT NAME: Jana Lopez  : 1965  DATE: 10/14/22  MRN: [id number]      Patient PCP Information       Provider PCP Type    ROGELIO Pacheco General            Reason for Visit / Chief Complaint: Hand Pain (Left/Room 6///)         History of Present Illness / Problem Focused Workflow     Jana Lopez presents to the clinic with Hand Pain (Left/Room 6///)   Ms Lopez presents with complaints for chronic headaches, continued left wrist and leg pain. Patient also reports continued facial 'tightness' and left sided eye / vision changes. Patient has been seen by ophthalmologist. Patient reports pressures normal. Has contact for left eye. Patient states left wrist and leg have continued to hurt with secondary weakness from pain. Patient keeping thumb tucked in palm on left hand, tightness noted on radial aspect of hand when stretching, pain at wrist. Decreased strength noted to right hand. Patient states left lower ext cramping like pain as well. Reports difference in pain in comparison with typical knee pain.   Priro hx of htn dm and inflammatory arthritis  Diabetes and htn controlled  Patient on MTX followed by Dr Urrutia Rheumatology  Hand Pain   There was no injury mechanism. The pain is present in the left wrist and left hand. The quality of the pain is described as cramping. Radiates to: wrist to hand. The pain is at a severity of 6/10. The pain is moderate. The pain has been Constant since the incident. Associated symptoms include muscle weakness. Associated symptoms comments: tightness. The symptoms are aggravated by movement. The treatment provided no relief (on mtx, mobic and pain medis. takes mag and potassium replacement as well).   Leg Pain   There was no injury mechanism. The pain is present in the left leg. The quality of the pain is described as cramping. The pain is at a severity of 6/10. The pain is moderate. Associated symptoms include  muscle weakness. She reports no foreign bodies present. Exacerbated by: lifting or crossing leg feels worse. She has tried NSAIDs and rest for the symptoms. The treatment provided no relief.   Headache   This is a recurrent problem. The current episode started more than 1 month ago. The problem occurs daily. The pain is located in the Occipital and frontal region. The pain radiates to the left arm. The pain quality is not similar to prior headaches. The quality of the pain is described as throbbing and pulsating. The pain is at a severity of 8/10. The pain is moderate. Associated symptoms include a visual change and weakness. The symptoms are aggravated by bright light. She has tried NSAIDs (goody powders) for the symptoms. The treatment provided mild relief. Her past medical history is significant for hypertension, immunosuppression and obesity. There is no history of cancer, cluster headaches, migraine headaches, migraines in the family, pseudotumor cerebri, recent head traumas, sinus disease or TMJ.   Review of Systems     Review of Systems   Constitutional: Negative.    HENT: Negative.     Eyes: Negative.    Respiratory: Negative.     Cardiovascular: Negative.    Gastrointestinal: Negative.    Endocrine: Negative.    Musculoskeletal:  Positive for arthralgias and joint swelling.   Skin: Negative.    Allergic/Immunologic: Negative.    Neurological:  Positive for weakness and headaches.   Hematological: Negative.    Psychiatric/Behavioral: Negative.       Medical / Social / Family History     Past Medical History:   Diagnosis Date    Asthma     Dyslipidemia     Hypertension     HIRA (obstructive sleep apnea)     Rheumatoid arthritis     SVT (supraventricular tachycardia)     followed at [facility],        Past Surgical History:   Procedure Laterality Date    CHOLECYSTECTOMY      HYSTERECTOMY      LITHOTRIPSY         Social History  Ms.  reports that she has never smoked. She has never used smokeless tobacco.  "She reports that she does not currently use alcohol. She reports that she does not use drugs.    Family History  Ms.'s family history includes Diabetes in an other family member; Heart disease in her father and mother; Hypertension in her daughter and father; Stroke in an other family member.    Medications and Allergies     Medications  Outpatient Medications Marked as Taking for the 10/14/22 encounter (Office Visit) with ROGELIO Pacheco   Medication Sig Dispense Refill    carvediloL (COREG) 6.25 MG tablet Take 1 tablet by mouth 2 (two) times a day.      chlorthalidone (HYGROTEN) 25 MG Tab Take 1 tablet by mouth once daily.      cloNIDine (CATAPRES) 0.1 MG tablet Take 1 tablet (0.1 mg total) by mouth once daily. 30 tablet 11    losartan (COZAAR) 100 MG tablet Take 100 mg by mouth once daily.      meloxicam (MOBIC) 15 MG tablet Take 15 mg by mouth once daily.      methotrexate 2.5 MG Tab Take by mouth.      NOVOLIN 70-30 FLEXPEN U-100 100 unit/mL (70-30) InPn pen INJECT 27 UNITS UNDER THE SKIN EVERY MORNING AND 20 UNITS EVERY EVENING 15 mL 0    sertraline (ZOLOFT) 25 MG tablet Take 1 tablet (25 mg total) by mouth once daily. 30 tablet 11    simvastatin (ZOCOR) 10 MG tablet Take 1 tablet (10 mg total) by mouth every evening. 90 tablet 3       Allergies  Review of patient's allergies indicates:   Allergen Reactions    Lisinopril Other (See Comments)     cough       Physical Examination     Vitals:    10/14/22 0924 10/14/22 0933   BP: 131/76 111/83   BP Location: Right arm Right arm   Patient Position: Sitting Sitting   Pulse: 73    Resp: 18    Temp: 97.9 °F (36.6 °C)    SpO2: 96%    Weight: 88.5 kg (195 lb 2 oz)    Height: 5' 2" (1.575 m)            Physical Exam  Vitals reviewed.   HENT:      Head: Normocephalic.      Right Ear: External ear normal.      Left Ear: External ear normal.      Nose: Nose normal.      Mouth/Throat:      Mouth: Mucous membranes are moist.   Eyes:      Pupils: Pupils are equal, round, " and reactive to light.   Cardiovascular:      Rate and Rhythm: Normal rate and regular rhythm.      Pulses: Normal pulses.      Heart sounds: Normal heart sounds.   Pulmonary:      Effort: Pulmonary effort is normal.      Breath sounds: Normal breath sounds.   Abdominal:      Palpations: Abdomen is soft.   Musculoskeletal:         General: Tenderness present.      Cervical back: Normal range of motion.      Right lower leg: No edema.      Left lower leg: No edema.      Comments: Left wrist tenderness at joint  Thumb resting in palm pain with extrension   Skin:     General: Skin is warm and dry.      Capillary Refill: Capillary refill takes less than 2 seconds.   Neurological:      Mental Status: She is alert and oriented to person, place, and time.      Sensory: No sensory deficit.      Gait: Gait normal.      Comments: Left hand weakness in comparison to right   Psychiatric:         Mood and Affect: Mood normal.         Behavior: Behavior normal.         Thought Content: Thought content normal.         Judgment: Judgment normal.         No visits with results within 14 Day(s) from this visit.   Latest known visit with results is:   Office Visit on 09/26/2022   Component Date Value Ref Range Status    Creatinine, Urine 09/26/2022 134  28 - 219 mg/dL Final    Microalbumin 09/26/2022 <0.5  0.0 - 2.8 mg/dL Final    Microalbumin/Creatinine Ratio 09/26/2022 <3.7  0.0 - 30.0 mg/g Final             Assessment and Plan (including Health Maintenance)       Plan:   Left hand pain  -     MRI Brain Without Contrast; Future; Expected date: 10/14/2022  -     X-Ray Wrist 2 View Left; Future; Expected date: 10/14/2022  -     Ambulatory referral/consult to Physical/Occupational Therapy; Future; Expected date: 10/21/2022  -     diclofenac sodium (VOLTAREN) 1 % Gel; Apply 2 g topically 3 (three) times daily.  Dispense: 50 g; Refill: 1    Left leg pain  -     MRI Brain Without Contrast; Future; Expected date: 10/14/2022  -      Ambulatory referral/consult to Physical/Occupational Therapy; Future; Expected date: 10/21/2022    Left arm weakness  -     MRI Brain Without Contrast; Future; Expected date: 10/14/2022    Inflammatory arthritis  -     MRI Brain Without Contrast; Future; Expected date: 10/14/2022  -     Ambulatory referral/consult to Physical/Occupational Therapy; Future; Expected date: 10/21/2022  -     Sedimentation Rate; Future; Expected date: 10/14/2022  -     C-Reactive Protein; Future; Expected date: 10/14/2022  -     diclofenac sodium (VOLTAREN) 1 % Gel; Apply 2 g topically 3 (three) times daily.  Dispense: 50 g; Refill: 1    Weakness of left lower extremity  -     MRI Brain Without Contrast; Future; Expected date: 10/14/2022    Persistent headaches  -     MRI Brain Without Contrast; Future; Expected date: 10/14/2022    Flu vaccine need  -     Influenza - Quadrivalent *Preferred* (6 months+) (PF)   Will call with results. Follow up in Dec as scheduled   Continue home medications as prescribed   There are no Patient Instructions on file for this visit.       Health Maintenance Due   Topic Date Due    Pneumococcal Vaccines (Age 0-64) (1 - PCV) Never done    TETANUS VACCINE  Never done    Shingles Vaccine (1 of 2) Never done    Colorectal Cancer Screening  Never done    COVID-19 Vaccine (4 - Booster for Pfizer series) 07/07/2022    Influenza Vaccine (1) Never done       Most Recent Immunizations   Administered Date(s) Administered    COVID-19, MRNA, LN-S, PF (Pfizer) (Purple Cap) 05/12/2022        Problem List Items Addressed This Visit          Orthopedic    Inflammatory arthritis    Relevant Medications    diclofenac sodium (VOLTAREN) 1 % Gel    Other Relevant Orders    MRI Brain Without Contrast    Ambulatory referral/consult to Physical/Occupational Therapy    Sedimentation Rate    C-Reactive Protein     Other Visit Diagnoses       Left hand pain    -  Primary    Relevant Medications    diclofenac sodium (VOLTAREN) 1 % Gel     Other Relevant Orders    MRI Brain Without Contrast    X-Ray Wrist 2 View Left    Ambulatory referral/consult to Physical/Occupational Therapy    Left leg pain        Relevant Orders    MRI Brain Without Contrast    Ambulatory referral/consult to Physical/Occupational Therapy    Left arm weakness        Relevant Orders    MRI Brain Without Contrast    Weakness of left lower extremity        Relevant Orders    MRI Brain Without Contrast    Persistent headaches        Relevant Orders    MRI Brain Without Contrast    Flu vaccine need        Relevant Orders    Influenza - Quadrivalent *Preferred* (6 months+) (PF)            Health Maintenance Topics with due status: Not Due       Topic Last Completion Date    Mammogram 03/26/2021    Foot Exam 01/21/2022    Lipid Panel 01/21/2022    Eye Exam 03/23/2022    Hemoglobin A1c 05/13/2022    Low Dose Statin 09/26/2022    Diabetes Urine Screening 09/26/2022       Future Appointments   Date Time Provider Department Center   10/14/2022 11:00 AM [facility] XR1 [facility]   10/27/2022 12:00 PM [facility] MRI2 [facility] MRI [facility]   12/27/2022 10:00 AM ROGELIO Pacheco [facility]            Signature:  ROGELIO Pacheco  [facility]     Date of encounter: 10/14/22

## 2022-10-20 DIAGNOSIS — M79.642 LEFT HAND PAIN: Primary | ICD-10-CM

## 2022-10-24 ENCOUNTER — OFFICE VISIT (OUTPATIENT)
Dept: FAMILY MEDICINE | Facility: CLINIC | Age: 57
End: 2022-10-24
Payer: COMMERCIAL

## 2022-10-24 VITALS
OXYGEN SATURATION: 93 % | WEIGHT: 195.13 LBS | BODY MASS INDEX: 35.91 KG/M2 | SYSTOLIC BLOOD PRESSURE: 118 MMHG | TEMPERATURE: 102 F | DIASTOLIC BLOOD PRESSURE: 69 MMHG | HEIGHT: 62 IN | HEART RATE: 98 BPM

## 2022-10-24 DIAGNOSIS — R50.9 FEVER, UNSPECIFIED FEVER CAUSE: Primary | ICD-10-CM

## 2022-10-24 DIAGNOSIS — J11.1 INFLUENZA: ICD-10-CM

## 2022-10-24 LAB
CTP QC/QA: YES
FLUAV AG NPH QL: POSITIVE
FLUBV AG NPH QL: NEGATIVE
SARS-COV-2 AG RESP QL IA.RAPID: NEGATIVE

## 2022-10-24 PROCEDURE — 3074F SYST BP LT 130 MM HG: CPT | Mod: CPTII,,, | Performed by: NURSE PRACTITIONER

## 2022-10-24 PROCEDURE — 99213 PR OFFICE/OUTPT VISIT, EST, LEVL III, 20-29 MIN: ICD-10-PCS | Mod: ,,, | Performed by: NURSE PRACTITIONER

## 2022-10-24 PROCEDURE — 3078F PR MOST RECENT DIASTOLIC BLOOD PRESSURE < 80 MM HG: ICD-10-PCS | Mod: CPTII,,, | Performed by: NURSE PRACTITIONER

## 2022-10-24 PROCEDURE — 3074F PR MOST RECENT SYSTOLIC BLOOD PRESSURE < 130 MM HG: ICD-10-PCS | Mod: CPTII,,, | Performed by: NURSE PRACTITIONER

## 2022-10-24 PROCEDURE — 99213 OFFICE O/P EST LOW 20 MIN: CPT | Mod: ,,, | Performed by: NURSE PRACTITIONER

## 2022-10-24 PROCEDURE — 3061F NEG MICROALBUMINURIA REV: CPT | Mod: CPTII,,, | Performed by: NURSE PRACTITIONER

## 2022-10-24 PROCEDURE — 4010F PR ACE/ARB THEARPY RXD/TAKEN: ICD-10-PCS | Mod: CPTII,,, | Performed by: NURSE PRACTITIONER

## 2022-10-24 PROCEDURE — 3044F PR MOST RECENT HEMOGLOBIN A1C LEVEL <7.0%: ICD-10-PCS | Mod: CPTII,,, | Performed by: NURSE PRACTITIONER

## 2022-10-24 PROCEDURE — 3066F NEPHROPATHY DOC TX: CPT | Mod: CPTII,,, | Performed by: NURSE PRACTITIONER

## 2022-10-24 PROCEDURE — 4010F ACE/ARB THERAPY RXD/TAKEN: CPT | Mod: CPTII,,, | Performed by: NURSE PRACTITIONER

## 2022-10-24 PROCEDURE — 1160F RVW MEDS BY RX/DR IN RCRD: CPT | Mod: CPTII,,, | Performed by: NURSE PRACTITIONER

## 2022-10-24 PROCEDURE — 87428 POCT SARS-COV2 (COVID) WITH FLU ANTIGEN: ICD-10-PCS | Mod: QW,,, | Performed by: NURSE PRACTITIONER

## 2022-10-24 PROCEDURE — 87428 SARSCOV & INF VIR A&B AG IA: CPT | Mod: QW,,, | Performed by: NURSE PRACTITIONER

## 2022-10-24 PROCEDURE — 3078F DIAST BP <80 MM HG: CPT | Mod: CPTII,,, | Performed by: NURSE PRACTITIONER

## 2022-10-24 PROCEDURE — 3061F PR NEG MICROALBUMINURIA RESULT DOCUMENTED/REVIEW: ICD-10-PCS | Mod: CPTII,,, | Performed by: NURSE PRACTITIONER

## 2022-10-24 PROCEDURE — 1160F PR REVIEW ALL MEDS BY PRESCRIBER/CLIN PHARMACIST DOCUMENTED: ICD-10-PCS | Mod: CPTII,,, | Performed by: NURSE PRACTITIONER

## 2022-10-24 PROCEDURE — 1159F PR MEDICATION LIST DOCUMENTED IN MEDICAL RECORD: ICD-10-PCS | Mod: CPTII,,, | Performed by: NURSE PRACTITIONER

## 2022-10-24 PROCEDURE — 3044F HG A1C LEVEL LT 7.0%: CPT | Mod: CPTII,,, | Performed by: NURSE PRACTITIONER

## 2022-10-24 PROCEDURE — 3066F PR DOCUMENTATION OF TREATMENT FOR NEPHROPATHY: ICD-10-PCS | Mod: CPTII,,, | Performed by: NURSE PRACTITIONER

## 2022-10-24 PROCEDURE — 1159F MED LIST DOCD IN RCRD: CPT | Mod: CPTII,,, | Performed by: NURSE PRACTITIONER

## 2022-10-24 RX ORDER — ACETAMINOPHEN 325 MG/1
650 TABLET ORAL
Status: COMPLETED | OUTPATIENT
Start: 2022-10-24 | End: 2022-10-24

## 2022-10-24 RX ORDER — OSELTAMIVIR PHOSPHATE 75 MG/1
75 CAPSULE ORAL 2 TIMES DAILY
Qty: 10 CAPSULE | Refills: 0 | Status: SHIPPED | OUTPATIENT
Start: 2022-10-24 | End: 2022-10-29

## 2022-10-24 RX ADMIN — ACETAMINOPHEN 650 MG: 325 TABLET ORAL at 09:10

## 2022-10-24 NOTE — PROGRESS NOTES
ROGELIO Pacheco   [facility]      PATIENT NAME: Jana Lopez  : 1965  DATE: 10/24/22  MRN: [id number]      Patient PCP Information       Provider PCP Type    ROGELIO Pacheco General            Reason for Visit / Chief Complaint: Fever and Generalized Body Aches           History of Present Illness / Problem Focused Workflow     Jana Lopez presents to the clinic with Fever and Generalized Body Aches     HPI  Ms Lopez presents to clinic with two day hx of fever, body aches, congestion. Patient has prior hx of inflammatory arthritis. On immunosuppressive therapy. Increased risk for infection .     Review of Systems     Review of Systems   Constitutional:  Positive for chills and fever. Negative for activity change, appetite change, diaphoresis, fatigue and unexpected weight change.   HENT:  Positive for congestion. Negative for ear pain, facial swelling, hearing loss, nosebleeds and sore throat.    Eyes: Negative.    Respiratory:  Negative for apnea, cough, shortness of breath and wheezing.    Cardiovascular:  Negative for chest pain, palpitations and leg swelling.   Gastrointestinal:  Negative for abdominal distention, abdominal pain, blood in stool, constipation, diarrhea and nausea.   Endocrine: Negative for cold intolerance, heat intolerance, polydipsia, polyphagia and polyuria.   Genitourinary:  Negative for decreased urine volume, difficulty urinating, dysuria, flank pain, frequency, hematuria and urgency.   Musculoskeletal:  Positive for myalgias. Negative for arthralgias and joint swelling.   Skin:  Negative for color change and rash.   Allergic/Immunologic: Negative.    Neurological:  Negative for dizziness, tremors, seizures, syncope, facial asymmetry, speech difficulty, weakness, light-headedness, numbness and headaches.   Hematological:  Negative for adenopathy. Does not bruise/bleed easily.   Psychiatric/Behavioral:  Negative for behavioral problems and confusion.       Medical / Social / Family History     Past Medical History:   Diagnosis Date    Asthma     Dyslipidemia     Hypertension     HIRA (obstructive sleep apnea)     Rheumatoid arthritis     SVT (supraventricular tachycardia)     followed at [facility],        Past Surgical History:   Procedure Laterality Date    CHOLECYSTECTOMY      HYSTERECTOMY      LITHOTRIPSY         Social History  Ms.  reports that she has never smoked. She has never used smokeless tobacco. She reports that she does not currently use alcohol. She reports that she does not use drugs.    Family History  Ms.'s family history includes Diabetes in an other family member; Heart disease in her father and mother; Hypertension in her daughter and father; Stroke in an other family member.    Medications and Allergies     Medications  Outpatient Medications Marked as Taking for the 10/24/22 encounter (Office Visit) with ROGELIO Pacheco   Medication Sig Dispense Refill    albuterol (PROVENTIL) 2.5 mg /3 mL (0.083 %) nebulizer solution Take 1 vial by nebulization every 4 to 6 hours as needed.      albuterol (PROVENTIL/VENTOLIN HFA) 90 mcg/actuation inhaler Inhale 2 puffs into the lungs 4 (four) times daily as needed for Wheezing. 18 g 1    ascorbic acid, vitamin C, (VITAMIN C) 500 MG tablet Take by mouth.      aspirin (ECOTRIN) 81 MG EC tablet Take 81 mg by mouth once daily.      carvediloL (COREG) 6.25 MG tablet Take 1 tablet by mouth 2 (two) times a day.      chlorthalidone (HYGROTEN) 25 MG Tab Take 1 tablet by mouth once daily.      cholecalciferol, vitamin D3, (VITAMIN D3) 25 mcg (1,000 unit) capsule Take 1 capsule by mouth.      cloNIDine (CATAPRES) 0.1 MG tablet Take 1 tablet (0.1 mg total) by mouth once daily. 30 tablet 11    cycloSPORINE (RESTASIS) 0.05 % ophthalmic emulsion Place into both eyes.      diclofenac sodium (VOLTAREN) 1 % Gel Apply 2 g topically 3 (three) times daily. 50 g 1    folic acid (FOLVITE) 1 MG tablet Take 1 mg by mouth  "once daily.      gabapentin (NEURONTIN) 100 MG capsule Take 1 capsule (100 mg total) by mouth 3 (three) times daily. 90 capsule 11    losartan (COZAAR) 100 MG tablet Take 100 mg by mouth once daily.      methotrexate 2.5 MG Tab Take by mouth.      NOVOLIN 70-30 FLEXPEN U-100 100 unit/mL (70-30) InPn pen INJECT 27 UNITS UNDER THE SKIN EVERY MORNING AND 20 UNITS EVERY EVENING 15 mL 0    omeprazole (PRILOSEC) 20 MG capsule Take 20 mg by mouth every morning.      oxyCODONE-acetaminophen (PERCOCET) 5-325 mg per tablet Take 1 tablet by mouth every 4 (four) hours as needed. for pain.      potassium &magnesium aspartate (MAG ASPART-POTASSIUM ASPART ORAL) Take 1 capsule by mouth once daily.      sertraline (ZOLOFT) 25 MG tablet Take 1 tablet (25 mg total) by mouth once daily. 30 tablet 11    simvastatin (ZOCOR) 10 MG tablet Take 1 tablet (10 mg total) by mouth every evening. 90 tablet 3     Current Facility-Administered Medications for the 10/24/22 encounter (Office Visit) with ROGELIO Pacheco   Medication Dose Route Frequency Provider Last Rate Last Admin    acetaminophen tablet 650 mg  650 mg Oral 1 time in Clinic/HOD ROGELIO Pacheco           Allergies  Review of patient's allergies indicates:   Allergen Reactions    Lisinopril Other (See Comments)     cough       Physical Examination     Vitals:    10/24/22 0854   BP: 118/69   BP Location: Other (Comment)  Comment: right wrist   Patient Position: Sitting   Pulse: 98   Temp: (!) 102.3 °F (39.1 °C)   TempSrc: Oral   SpO2: (!) 93%   Weight: 88.5 kg (195 lb 1.7 oz)  Comment: previous   Height: 5' 2" (1.575 m)  Comment: previous       Physical Exam  Vitals reviewed.   Constitutional:       Appearance: Normal appearance.   HENT:      Head: Normocephalic.      Right Ear: External ear normal.      Left Ear: External ear normal.      Nose: Congestion present.      Mouth/Throat:      Mouth: Mucous membranes are moist.   Eyes:      Extraocular Movements: Extraocular " movements intact.   Cardiovascular:      Rate and Rhythm: Normal rate and regular rhythm.      Pulses: Normal pulses.      Heart sounds: Normal heart sounds.   Pulmonary:      Effort: Pulmonary effort is normal.      Breath sounds: Normal breath sounds.   Abdominal:      Palpations: Abdomen is soft.   Musculoskeletal:      Cervical back: Normal range of motion.   Skin:     General: Skin is warm and dry.      Capillary Refill: Capillary refill takes less than 2 seconds.   Neurological:      General: No focal deficit present.      Mental Status: She is alert and oriented to person, place, and time.   Psychiatric:         Mood and Affect: Mood normal.         Behavior: Behavior normal.         Thought Content: Thought content normal.         Judgment: Judgment normal.         Office Visit on 10/24/2022   Component Date Value Ref Range Status    SARS Coronavirus 2 Antigen 10/24/2022 Negative  Negative Final    Rapid Influenza A Ag 10/24/2022 Positive (A)  Negative Final    Rapid Influenza B Ag 10/24/2022 Negative  Negative Final     Acceptable 10/24/2022 Yes   Final             Assessment and Plan (including Health Maintenance)         Plan:   Fever, unspecified fever cause  -     POCT SARS-COV2 (COVID) with Flu Antigen  -     acetaminophen tablet 650 mg    Influenza  -     oseltamivir (TAMIFLU) 75 MG capsule; Take 1 capsule (75 mg total) by mouth 2 (two) times daily. for 5 days  Dispense: 10 capsule; Refill: 0   Patient tested positive for influenza  Take tamiflu as directed  Tylenol/Motrin as needed as directed for fever/headaches/bodyaches   OTC cough medicine as needed for cough  RTC if symptoms persist or fail to improve  Adequate oral hydration     There are no Patient Instructions on file for this visit.       Health Maintenance Due   Topic Date Due    Pneumococcal Vaccines (Age 0-64) (1 - PCV) Never done    TETANUS VACCINE  Never done    Shingles Vaccine (1 of 2) Never done    Colorectal Cancer  Screening  Never done    COVID-19 Vaccine (4 - Booster for Pfizer series) 07/07/2022       Most Recent Immunizations   Administered Date(s) Administered    COVID-19, MRNA, LN-S, PF (Pfizer) (Purple Cap) 05/12/2022    Influenza - Quadrivalent - PF *Preferred* (6 months and older) 10/14/2022        Problem List Items Addressed This Visit    None  Visit Diagnoses       Fever, unspecified fever cause    -  Primary    Relevant Medications    acetaminophen tablet 650 mg    Other Relevant Orders    POCT SARS-COV2 (COVID) with Flu Antigen (Completed)    Influenza        Relevant Medications    oseltamivir (TAMIFLU) 75 MG capsule            Health Maintenance Topics with due status: Not Due       Topic Last Completion Date    Mammogram 03/26/2021    Foot Exam 01/21/2022    Lipid Panel 01/21/2022    Eye Exam 03/23/2022    Hemoglobin A1c 05/13/2022    Diabetes Urine Screening 09/26/2022    Low Dose Statin 10/14/2022       Future Appointments   Date Time Provider Department Center   10/27/2022 12:00 PM [address]   11/2/2022  9:00 AM Cyndi Campbell, OT [facility]   11/2/2022 10:00 AM Karli Coello, PT [facility]   12/27/2022 10:00 AM ROGELIO Pacheco [facility]            Signature:  ROGELIO Pacheco  [facility]     Date of encounter: 10/24/22

## 2022-10-25 ENCOUNTER — TELEPHONE (OUTPATIENT)
Dept: FAMILY MEDICINE | Facility: CLINIC | Age: 57
End: 2022-10-25
Payer: COMMERCIAL

## 2022-10-25 NOTE — TELEPHONE ENCOUNTER
----- Message from Debo Kent sent at 10/24/2022 10:14 AM CDT -----  Patient needs precription transferred [facility]        Patient callback [phone]

## 2022-10-28 RX ORDER — BENZONATATE 100 MG/1
100 CAPSULE ORAL 3 TIMES DAILY PRN
Qty: 30 CAPSULE | Refills: 0 | Status: SHIPPED | OUTPATIENT
Start: 2022-10-28 | End: 2022-11-07

## 2022-10-28 RX ORDER — AZITHROMYCIN 250 MG/1
TABLET, FILM COATED ORAL
Qty: 6 TABLET | Refills: 0 | Status: SHIPPED | OUTPATIENT
Start: 2022-10-28 | End: 2022-11-02

## 2022-10-28 NOTE — PROGRESS NOTES
Patient with recent flu. On last dose of tamiflu. Worsening cough, congestion   Patient with hx of inflammatory arthritis on immunosuppressant therapy.   Prescribed azithromycin and tessalon, coverage for possible secondary infection.

## 2022-11-01 ENCOUNTER — TELEPHONE (OUTPATIENT)
Dept: FAMILY MEDICINE | Facility: CLINIC | Age: 57
End: 2022-11-01
Payer: COMMERCIAL

## 2022-11-01 NOTE — TELEPHONE ENCOUNTER
Spoke with pt, advised pt to come into the clinic to be seen Thursday if symptoms worsen. Advised pt to go to another clinic if she feels she needs to be seen before then.

## 2022-11-01 NOTE — TELEPHONE ENCOUNTER
----- Message from Nury Art sent at 11/1/2022  2:15 PM CDT -----  Patient asked nurse to call her back, call back number [phone]

## 2022-11-09 NOTE — PROGRESS NOTES
[facility] Occupational Therapy  Initial Evaluation     Date: 11/10/2022  Name: Jana Lopez  Clinic Number: [id number]    Therapy Diagnosis: No diagnosis found.  Physician: Adri Kelly FNP    Physician Orders: Evaluate and treat.  Medical Diagnosis: Left hand pain,de quervain's tendonitis  Date of Injury/Onset: 1month ago  Evaluation Date: 11/10/2022  Insurance Authorization Period Expiration: 10/20/2022 - 10/20/2023  Plan of Care Certification Period: 11/10/2022-1/5/2023      Visit # / Visits authorized: 1 / 1    FOTO: initial eval  Medicare Amount:     Time In:10:05  Time Out: 10:50  Total treatment time: 45minutes      Precautions:  Standard    Subjective     Involved Side: Left upper extremity  Dominant Side: Right  Date of Onset: 1month ago  History of Current Condition: Pt. Reports about 3weeks-month ago she started having pain in LUE. Reports she saw  About a week out who diagnosed her with arthritis. She is positive for finkelsteins test indicating she probably has some tendonitis of LUE.  Imaging: bone scan films       Past Medical History/Physical Systems Review:   Jana Lopez  has a past medical history of Asthma, Dyslipidemia, Hypertension, HIRA (obstructive sleep apnea), Rheumatoid arthritis, and SVT (supraventricular tachycardia).    Jana Lopez  has a past surgical history that includes Cholecystectomy; Hysterectomy; and Lithotripsy.    Jana has a current medication list which includes the following prescription(s): albuterol, albuterol, ascorbic acid (vitamin c), aspirin, carvedilol, cetirizine, chlorthalidone, cholecalciferol (vitamin d3), clonidine, cyclosporine, diclofenac sodium, folic acid, gabapentin, losartan, magnesium, meloxicam, methotrexate, methotrexate, mometasone, novolin 70-30 flexpen u-100, omeprazole, oxycodone-acetaminophen, potassium &magnesium aspartate, sertraline, simvastatin, and tizanidine.    Review of patient's allergies indicates:    Allergen Reactions    Lisinopril Other (See Comments)     cough        Patient's Goals for Therapy: To be able to use hand again without pain.    Pain:  Functional Pain Scale Rating 0-10:   5/10 on average  5/10 at best  10/10 at worst  Location: Left wrist/hand radial  Description: Aching, Dull, Burning, Throbbing, Grabbing, and Tight  Aggravating Factors: Bending, Touching, Walking, Night Time, Morning, Extension, Flexing, and Lifting  Easing Factors: relaxation, rest, and elevation    Occupation:  Disabled  Working presently: disability      Functional Limitations/Social History:    Previous functional status includes: Independent with all ADLs.     Current FunctionalStatus   Home/Living environment : lives with their family      Limitation of Functional Status as follows:   ADLs/IADLs:     - Feeding: (I)    - Bathing: (I)    - Dressing/Grooming: (I)    - Driving: (I)            Objective     Observation/Appearance:     Edema. Measured in centimeters.   11/9/2022 11/9/2022    Right Left   2in. Above elbow     2in. Below elbow     Wrist Crease     Distal Palmar Crease     MCPs       Edema. Measured in centimeters.   11/9/2022 11/9/2022    Right Left   Index:       P1      PIP     P2      DIP     P3     Long:       P1      PIP     P2      DIP     P3     Ring:       P1                 PIP                P2                  DIP     P3     Small:        P1                 PIP            P2             DIP     P3     Thumb:     Prox. Phalanx     IP     Distal Phalanx       Elbow and Wrist ROM. Measured in degrees.   11/9/2022 11/9/2022    Right Left   Wrist Ext/Flex 61/66 61/49     Hand ROM. Measured in degrees.   11/9/2022 11/9/2022    Right Left        Index: MP  66 71              PIP     105 101              DIP 68 61              SAMANIEGO          Long:  MP 83 84               110              DIP 70 58              SAMANIEGO          Ring:   MP 86 83               111              DIP 73 51              SAMANIEGO           Small:  MP 94 91                103               DIP 75 56              SAMANIEGO          Thumb: MP 66 31                IP 9 9      Strength (Dynamometer) and Pinch Strength (Pinch Gauge)  Measured in pounds.   11/9/2022 11/9/2022    Right Left   Rung II 30 16   Key Pinch 14 8   3pt Pinch 13 4   2pt Pinch 9 4       Manual Muscle Test   11/9/2022 11/9/2022    Right Left   Wrist Extension  5/5 -4/5   Wrist Flexion 5/5 -4/5         Special Tests  Thumb CMC Grind Test    Finkelstein's Test positive   Phalen's Test    Tinel's Test    Michele's Test    [place]ittler Test    Digital Collateral Stress Test    ORL Test    Froment's Sign    Pinch OK Sign    Cynthia's Sign     [place] Sign     Clamp Sign     SL Ballottement Test    LT Ballottement Test    Scaphoid/WatsonTest    Linscheid's Test    Metacarpal Stress Test    Piano Key Test    ECU Synergy Test    Ulnar Compression Test    TFCC Load Test    Ulnocarpal Stress Test    Midcarpal Shift Test    Pisiform Boost Test    Elbow Flexion Test    Scratch Collapse Test    Tennis Elbow Test    Resisted Middle Finger Extension Test    Mills Test    Chair Test    Biceps Squeeze Test    Biceps Hook Test    Milking Test    Press Up Manuever    PLRI Test    Valgus Stress Test    Varus Stress Test    Spurling Test    Cervical Distraction Test    ULNTT - General    ULNTT - Median Nerve    ULNTT - Radial Nerve    ULNTT - Ulnar Nerve         CMS Impairment/Limitation/Restriction for FOTO  Survey    Therapist reviewed FOTO scores for Carolin Lopez on 11/10/2022.   FOTO documents entered into [employer] - see Media section.    Limitation Score: 38%         Treatment     Treatment Time In: 10:05  Treatment Time Out: 10:50      CAROLIN received the following supervised modalities after being cleared for contradictions for  minutes:   -N/A    CAROLIN received the following manual therapy techniques for  minutes:   -N/A    CAROLIN received therapeutic exercises for  minutes  including:  -N/A    Home Exercise Program/Education:  Issued HEP (see patient instructions in EMR) and educated on modality use for pain management . Exercises were reviewed and CAROLIN was able to demonstrate them prior to the end of the session.   Pt received a written copy of exercises to perform at home. CAROLIN demonstrated good  understanding of the education provided.  Pt was advised to perform these exercises free of pain, and to stop performing them if pain occurs.    Patient/Family Education: role of OT, goals for OT, scheduling/cancellations - pt verbalized understanding. Discussed insurance limitations with patient.    Additional Education provided:     Assessment     Carolin Lopez is a 57 y.o. female referred to outpatient occupational therapy and presents with a medical diagnosis of Left hand pain, resulting in decreased ROM/strength with increased pain and demonstrates limitations as described in the chart above. Pt. Is positive for finkelsteins test. Following medical record review it is determined that pt will benefit from occupational therapy services in order to maximize pain free and/or functional use of left upper extremity. The following goals were discussed with the patient and patient is in agreement with them as to be addressed in the treatment plan. The patient's rehab potential is Good.     Anticipated barriers to occupational therapy:   Pt has no cultural, educational or language barriers to learning provided.        Goals:   The following goals were discussed with the patient and patient is in agreement with them as to be addressed in the treatment plan.   Short term Goals:  1) Initiate HEP  2) Pt will increase AROM of LUE by 5-10 degrees in order to assist with functional full fist by 4 weeks.  3) Pt will reduce edema by .5-1 cm in affected fingers by 4 weeks.  4) Pt will reduce pain to less than 4/10 by 4 weeks.  5) Pt will increase functional  strength by 5# in order to A in  opening containers for med management or home management tasks by 4 weeks.   6) Patient will be able to achieve less than or equal to 38% on the FOTO, demonstrating overall improved functional ability with upper extremity. (Self-care category)    Long Term Goals:  Goals to be met by discharge:  1) Independent with HEP  2) Pt will increase LUE SAMANIEGO by 15-20 degrees in order to increase functional fist for grasp with home management or work related tasks by d/c.   3) Pt will decrease edema to trace or none to increase functional ROM by d/c.   4) Pt will decrease pain to trace or none while completing light home management tasks or work related tasks by d/c.   5) Patient will be able to achieve less than or equal to 80% on the FOTO, demonstrating overall improved functional ability with upper extremity.  (Self-care category)        Plan   Certification Period/Plan of care expiration: 11/10/2022 to 1/5/2023.    Outpatient Occupational Therapy 2 times weekly for 8 weeks to include the following interventions: Paraffin, Fluidotherapy, Manual therapy/joint mobilizations, Modalities for pain management, US 3 mhz, Therapeutic exercises/activities., Iontophoresis with 2.0 cc Dexamethasone, Strengthening, Orthotic Fabrication/Fit/Training, Edema Control, Electrical Modalities, Joint Protection, and Energy Conservation.      ANNETTA NEVES, OT

## 2022-11-10 ENCOUNTER — CLINICAL SUPPORT (OUTPATIENT)
Dept: REHABILITATION | Facility: HOSPITAL | Age: 57
End: 2022-11-10
Payer: COMMERCIAL

## 2022-11-10 DIAGNOSIS — M79.642 LEFT HAND PAIN: ICD-10-CM

## 2022-11-10 PROCEDURE — 97166 OT EVAL MOD COMPLEX 45 MIN: CPT

## 2022-11-10 NOTE — PLAN OF CARE
[facility] Occupational Therapy  Initial Evaluation     Date: 11/10/2022  Name: Jana Lopez  Clinic Number: [id number]    Therapy Diagnosis: No diagnosis found.  Physician: Adri Kelyl FNP    Physician Orders: Evaluate and treat.  Medical Diagnosis: Left hand pain,de quervain's tendonitis  Date of Injury/Onset: 1month ago  Evaluation Date: 11/10/2022  Insurance Authorization Period Expiration: 10/20/2022 - 10/20/2023  Plan of Care Certification Period: 11/10/2022-1/5/2023      Visit # / Visits authorized: 1 / 1    FOTO: initial eval  Medicare Amount:     Time In:10:05  Time Out: 10:50  Total treatment time: 45minutes      Precautions:  Standard    Subjective     Involved Side: Left upper extremity  Dominant Side: Right  Date of Onset: 1month ago  History of Current Condition: Pt. Reports about 3weeks-month ago she started having pain in LUE. Reports she saw  About a week out who diagnosed her with arthritis. She is positive for finkelsteins test indicating she probably has some tendonitis of LUE.  Imaging: bone scan films       Past Medical History/Physical Systems Review:   Jana Lopez  has a past medical history of Asthma, Dyslipidemia, Hypertension, HIRA (obstructive sleep apnea), Rheumatoid arthritis, and SVT (supraventricular tachycardia).    Jana Lopez  has a past surgical history that includes Cholecystectomy; Hysterectomy; and Lithotripsy.    Jana has a current medication list which includes the following prescription(s): albuterol, albuterol, ascorbic acid (vitamin c), aspirin, carvedilol, cetirizine, chlorthalidone, cholecalciferol (vitamin d3), clonidine, cyclosporine, diclofenac sodium, folic acid, gabapentin, losartan, magnesium, meloxicam, methotrexate, methotrexate, mometasone, novolin 70-30 flexpen u-100, omeprazole, oxycodone-acetaminophen, potassium &magnesium aspartate, sertraline, simvastatin, and tizanidine.    Review of patient's allergies indicates:    Allergen Reactions    Lisinopril Other (See Comments)     cough        Patient's Goals for Therapy: To be able to use hand again without pain.    Pain:  Functional Pain Scale Rating 0-10:   5/10 on average  5/10 at best  10/10 at worst  Location: Left wrist/hand radial  Description: Aching, Dull, Burning, Throbbing, Grabbing, and Tight  Aggravating Factors: Bending, Touching, Walking, Night Time, Morning, Extension, Flexing, and Lifting  Easing Factors: relaxation, rest, and elevation    Occupation:  Disabled  Working presently: disability      Functional Limitations/Social History:    Previous functional status includes: Independent with all ADLs.     Current FunctionalStatus   Home/Living environment : lives with their family      Limitation of Functional Status as follows:   ADLs/IADLs:     - Feeding: (I)    - Bathing: (I)    - Dressing/Grooming: (I)    - Driving: (I)            Objective     Observation/Appearance:     Edema. Measured in centimeters.   11/9/2022 11/9/2022    Right Left   2in. Above elbow     2in. Below elbow     Wrist Crease     Distal Palmar Crease     MCPs       Edema. Measured in centimeters.   11/9/2022 11/9/2022    Right Left   Index:       P1      PIP     P2      DIP     P3     Long:       P1      PIP     P2      DIP     P3     Ring:       P1                 PIP                P2                  DIP     P3     Small:        P1                 PIP            P2             DIP     P3     Thumb:     Prox. Phalanx     IP     Distal Phalanx       Elbow and Wrist ROM. Measured in degrees.   11/9/2022 11/9/2022    Right Left   Wrist Ext/Flex 61/66 61/49     Hand ROM. Measured in degrees.   11/9/2022 11/9/2022    Right Left        Index: MP  66 71              PIP     105 101              DIP 68 61              SAMANIEGO          Long:  MP 83 84               110              DIP 70 58              SAMANIEGO          Ring:   MP 86 83               111              DIP 73 51              SAMANIEGO           Small:  MP 94 91                103               DIP 75 56              SAMANIEGO          Thumb: MP 66 31                IP 9 9      Strength (Dynamometer) and Pinch Strength (Pinch Gauge)  Measured in pounds.   11/9/2022 11/9/2022    Right Left   Rung II 30 16   Key Pinch 14 8   3pt Pinch 13 4   2pt Pinch 9 4       Manual Muscle Test   11/9/2022 11/9/2022    Right Left   Wrist Extension  5/5 -4/5   Wrist Flexion 5/5 -4/5         Special Tests  Thumb CMC Grind Test    Finkelstein's Test positive   Phalen's Test    Tinel's Test    Michele's Test    Massillon-Littler Test    Digital Collateral Stress Test    ORL Test    Froment's Sign    Pinch OK Sign    Cynthia's Sign     Egawa Sign     Clamp Sign     SL Ballottement Test    LT Ballottement Test    Scaphoid/WatsonTest    Linscheid's Test    Metacarpal Stress Test    Piano Key Test    ECU Synergy Test    Ulnar Compression Test    TFCC Load Test    Ulnocarpal Stress Test    Midcarpal Shift Test    Pisiform Boost Test    Elbow Flexion Test    Scratch Collapse Test    Tennis Elbow Test    Resisted Middle Finger Extension Test    Mills Test    Chair Test    Biceps Squeeze Test    Biceps Hook Test    Milking Test    Press Up Manuever    PLRI Test    Valgus Stress Test    Varus Stress Test    Spurling Test    Cervical Distraction Test    ULNTT - General    ULNTT - Median Nerve    ULNTT - Radial Nerve    ULNTT - Ulnar Nerve         CMS Impairment/Limitation/Restriction for FOTO  Survey    Therapist reviewed FOTO scores for Carolin Lopez on 11/10/2022.   FOTO documents entered into [employer] - see Media section.    Limitation Score: 38%         Treatment     Treatment Time In: 10:05  Treatment Time Out: 10:50      CAROLIN received the following supervised modalities after being cleared for contradictions for  minutes:   -N/A    CAROLIN received the following manual therapy techniques for  minutes:   -N/A    CAROLIN received therapeutic exercises for  minutes  including:  -N/A    Home Exercise Program/Education:  Issued HEP (see patient instructions in EMR) and educated on modality use for pain management . Exercises were reviewed and CAROLIN was able to demonstrate them prior to the end of the session.   Pt received a written copy of exercises to perform at home. CAROLIN demonstrated good  understanding of the education provided.  Pt was advised to perform these exercises free of pain, and to stop performing them if pain occurs.    Patient/Family Education: role of OT, goals for OT, scheduling/cancellations - pt verbalized understanding. Discussed insurance limitations with patient.    Additional Education provided:     Assessment     Carolin Lopez is a 57 y.o. female referred to outpatient occupational therapy and presents with a medical diagnosis of Left hand pain, resulting in decreased ROM/strength with increased pain and demonstrates limitations as described in the chart above. Pt. Is positive for finkelsteins test. Following medical record review it is determined that pt will benefit from occupational therapy services in order to maximize pain free and/or functional use of left upper extremity. The following goals were discussed with the patient and patient is in agreement with them as to be addressed in the treatment plan. The patient's rehab potential is Good.     Anticipated barriers to occupational therapy:   Pt has no cultural, educational or language barriers to learning provided.        Goals:   The following goals were discussed with the patient and patient is in agreement with them as to be addressed in the treatment plan.   Short term Goals:  1) Initiate HEP  2) Pt will increase AROM of LUE by 5-10 degrees in order to assist with functional full fist by 4 weeks.  3) Pt will reduce edema by .5-1 cm in affected fingers by 4 weeks.  4) Pt will reduce pain to less than 4/10 by 4 weeks.  5) Pt will increase functional  strength by 5# in order to A in  opening containers for med management or home management tasks by 4 weeks.   6) Patient will be able to achieve less than or equal to 38% on the FOTO, demonstrating overall improved functional ability with upper extremity. (Self-care category)    Long Term Goals:  Goals to be met by discharge:  1) Independent with HEP  2) Pt will increase LUE SAMANIEGO by 15-20 degrees in order to increase functional fist for grasp with home management or work related tasks by d/c.   3) Pt will decrease edema to trace or none to increase functional ROM by d/c.   4) Pt will decrease pain to trace or none while completing light home management tasks or work related tasks by d/c.   5) Patient will be able to achieve less than or equal to 80% on the FOTO, demonstrating overall improved functional ability with upper extremity.  (Self-care category)        Plan   Certification Period/Plan of care expiration: 11/10/2022 to 1/5/2023.    Outpatient Occupational Therapy 2 times weekly for 8 weeks to include the following interventions: Paraffin, Fluidotherapy, Manual therapy/joint mobilizations, Modalities for pain management, US 3 mhz, Therapeutic exercises/activities., Iontophoresis with 2.0 cc Dexamethasone, Strengthening, Orthotic Fabrication/Fit/Training, Edema Control, Electrical Modalities, Joint Protection, and Energy Conservation.      ANNETTA NEVES, OT

## 2022-11-17 ENCOUNTER — CLINICAL SUPPORT (OUTPATIENT)
Dept: REHABILITATION | Facility: HOSPITAL | Age: 57
End: 2022-11-17
Payer: COMMERCIAL

## 2022-11-17 DIAGNOSIS — M79.642 LEFT HAND PAIN: Primary | ICD-10-CM

## 2022-11-17 PROCEDURE — 97018 PARAFFIN BATH THERAPY: CPT

## 2022-11-17 PROCEDURE — 97110 THERAPEUTIC EXERCISES: CPT

## 2022-11-17 PROCEDURE — 97140 MANUAL THERAPY 1/> REGIONS: CPT

## 2022-11-17 NOTE — PROGRESS NOTES
[facility]  Occupational Therapy Treatment Note    Date: 11/17/2022  Name: Carolin Lopez  Clinic Number: [id number]    Therapy Diagnosis: No diagnosis found.  Physician: Adri Kelly FNP    Physician Orders: Evaluate and treat.  Medical Diagnosis: Left hand pain,de quervain's tendonitis  Date of onset: 1month ago,   Evaluation Date: 11/10/2022  Insurance Authorization Period Expiration: 10/20/2022 - 10/20/2023  Plan of Care Expiration: 11/10/2022-1/5/2023    Visit # / Visits authorized: 2 / 13      Precautions:  Standard    Time In: 10:10  Time Out: 10:55  Total Billable Time: 45 minutes    SUBJECTIVE     Pt reports: she tried to perform exercises at home.  She was compliant with home exercise program given last session.   Response to previous treatment:  Functional change:     Pain: 6/10  Location: left fingers , hands , and wrists      OBJECTIVE     Objective Measures updated at progress report unless specified.    Treatment     CAROLIN received the treatments listed below:     Supervised modalities after being cleared for contradictions: Paraffin bath - 8min.    Direct contact modalities after being cleared for contraindications: N/A    Manual therapy techniques: Joint mobilizations, Myofacial release, Soft tissue Mobilization, and Friction Massage were applied to the: LUE for 20 minutes, including:  -LUE PROM-wrist flx/ext- 2x10, joint mobz/distraction- 2x10, carpal bone spread- 3x10    Therapeutic exercises to develop strength, endurance, ROM, and flexibility for 17 minutes, including:  -LUE AROM-Handgripper- 2x10, digi flx- 3x10        Patient Education and Home Exercises      Education provided:   -   - Progress towards goals     Written Home Exercises Provided: Patient instructed to cont prior HEP.  Exercises were reviewed and CAROLIN was able to demonstrate them prior to the end of the session.  CAROLIN demonstrated good  understanding of the HEP provided. See EMR under  Patient Instructions for exercises provided during therapy sessions.       Assessment     Pt would continue to benefit from skilled OT.      CAROLIN is not progressing well towards her goals and there are no updates to goals at this time. Pt prognosis is Fair.     Pt will continue to benefit from skilled outpatient occupational therapy to address the deficits listed in the problem list on initial evaluation provide pt/family education and to maximize pt's level of independence in the home and community environment.     Pt's spiritual, cultural and educational needs considered and pt agreeable to plan of care and goals.    Anticipated barriers to occupational therapy:     Goals:  Pt. Will increase AROM of LUE as measured by goniometric measurements.  Pt. Will increase Left /pinch strength as measured by dynamometer/pinch gauge.  Pt. Will demonstrate ability to utilize LUE to perform functional tasks (I).  Pt. Will be (I) with HEP.    PLAN       Continue OT POC.      ANNETTA NEVES, OT        Pt. Did not return to treatment. D/C further OT treatment.

## 2022-11-18 ENCOUNTER — HOSPITAL ENCOUNTER (OUTPATIENT)
Dept: RADIOLOGY | Facility: HOSPITAL | Age: 57
Discharge: HOME | End: 2022-11-18
Attending: NURSE PRACTITIONER
Payer: COMMERCIAL

## 2022-11-18 DIAGNOSIS — M79.642 LEFT HAND PAIN: ICD-10-CM

## 2022-11-18 DIAGNOSIS — R51.9 PERSISTENT HEADACHES: ICD-10-CM

## 2022-11-18 DIAGNOSIS — M19.90 INFLAMMATORY ARTHRITIS: ICD-10-CM

## 2022-11-18 DIAGNOSIS — R29.898 LEFT ARM WEAKNESS: ICD-10-CM

## 2022-11-18 DIAGNOSIS — R29.898 WEAKNESS OF LEFT LOWER EXTREMITY: ICD-10-CM

## 2022-11-18 DIAGNOSIS — M79.605 LEFT LEG PAIN: ICD-10-CM

## 2022-11-18 PROCEDURE — 70551 MRI BRAIN STEM W/O DYE: CPT | Mod: TC

## 2022-11-18 PROCEDURE — 70551 MRI BRAIN STEM W/O DYE: CPT | Mod: 26,,, | Performed by: RADIOLOGY

## 2022-11-18 PROCEDURE — 70551 MRI BRAIN WITHOUT CONTRAST: ICD-10-PCS | Mod: 26,,, | Performed by: RADIOLOGY

## 2022-11-22 DIAGNOSIS — M19.90 INFLAMMATORY ARTHRITIS: ICD-10-CM

## 2022-11-22 DIAGNOSIS — M25.532 LEFT WRIST PAIN: Primary | ICD-10-CM

## 2022-11-29 NOTE — PROGRESS NOTES
[facility]  Occupational Therapy Treatment Note    Date: 11/30/2022  Name: Carolin Slater [place]  Clinic Number: [id number]    Therapy Diagnosis: No diagnosis found.  Physician: Adri Kelly FNP    Physician Orders: Evaluate and treat.  Medical Diagnosis: Left hand pain,de quervain's tendonitis  Date of onset: 1month ago,   Evaluation Date: 11/10/2022  Insurance Authorization Period Expiration: 10/20/2022 - 10/20/2023  Plan of Care Expiration: 11/10/2022-1/5/2023    Visit # / Visits authorized: 3 / 13      Precautions:  Standard    Time In: 9:15  Time Out: 9:57  Total Billable Time: 42 minutes    SUBJECTIVE     Pt reports: she is having pain in wrist/hand.  She was compliant with home exercise program given last session.   Response to previous treatment:  Functional change:     Pain: 8/10  Location: left fingers , hands , and wrists      OBJECTIVE     Objective Measures updated at progress report unless specified.    Treatment     CAROLIN received the treatments listed below:     Supervised modalities after being cleared for contradictions: Paraffin bath - 8min.    Direct contact modalities after being cleared for contraindications: N/A    Manual therapy techniques: Joint mobilizations, Myofacial release, Soft tissue Mobilization, and Friction Massage were applied to the: LUE for 34 minutes, including:  -LUE PROM-wrist flx/ext- 2x10, joint mobz/distraction- 2x10, carpal bone spread- 3x10, thumb ip flx/ext- 2x10, thumb mp flx/ext- 2x10, cmc flx/ext- 2x10    Therapeutic exercises to develop strength, endurance, ROM, and flexibility for  minutes, including:  -LUE AROM-N/A        Patient Education and Home Exercises      Education provided:   -   - Progress towards goals     Written Home Exercises Provided: Patient instructed to cont prior HEP.  Exercises were reviewed and CAROLIN was able to demonstrate them prior to the end of the session.  CAROLIN demonstrated good  understanding of the HEP  provided. See EMR under Patient Instructions for exercises provided during therapy sessions.       Assessment     Pt would continue to benefit from skilled OT.      CAROLIN is not progressing well towards her goals and there are no updates to goals at this time. Pt prognosis is Fair.     Pt will continue to benefit from skilled outpatient occupational therapy to address the deficits listed in the problem list on initial evaluation provide pt/family education and to maximize pt's level of independence in the home and community environment.     Pt's spiritual, cultural and educational needs considered and pt agreeable to plan of care and goals.    Anticipated barriers to occupational therapy:     Goals:  Pt. Will increase AROM of LUE as measured by goniometric measurements.  Pt. Will increase Left /pinch strength as measured by dynamometer/pinch gauge.  Pt. Will demonstrate ability to utilize LUE to perform functional tasks (I).  Pt. Will be (I) with HEP.    PLAN       Continue OT POC.      ANNETTA NEVES, OT

## 2022-11-30 ENCOUNTER — CLINICAL SUPPORT (OUTPATIENT)
Dept: REHABILITATION | Facility: HOSPITAL | Age: 57
End: 2022-11-30
Payer: COMMERCIAL

## 2022-11-30 DIAGNOSIS — M79.642 LEFT HAND PAIN: Primary | ICD-10-CM

## 2022-11-30 PROCEDURE — 97018 PARAFFIN BATH THERAPY: CPT | Mod: 59

## 2022-11-30 PROCEDURE — 97140 MANUAL THERAPY 1/> REGIONS: CPT

## 2022-12-01 ENCOUNTER — OFFICE VISIT (OUTPATIENT)
Dept: FAMILY MEDICINE | Facility: CLINIC | Age: 57
End: 2022-12-01
Payer: COMMERCIAL

## 2022-12-01 ENCOUNTER — HOSPITAL ENCOUNTER (OUTPATIENT)
Dept: RADIOLOGY | Facility: HOSPITAL | Age: 57
Discharge: HOME | End: 2022-12-01
Attending: NURSE PRACTITIONER
Payer: COMMERCIAL

## 2022-12-01 VITALS
WEIGHT: 200.5 LBS | SYSTOLIC BLOOD PRESSURE: 118 MMHG | HEIGHT: 62 IN | RESPIRATION RATE: 18 BRPM | DIASTOLIC BLOOD PRESSURE: 71 MMHG | HEART RATE: 77 BPM | TEMPERATURE: 98 F | OXYGEN SATURATION: 97 % | BODY MASS INDEX: 36.9 KG/M2

## 2022-12-01 DIAGNOSIS — Z79.4 TYPE 2 DIABETES MELLITUS WITH OTHER SPECIFIED COMPLICATION, WITH LONG-TERM CURRENT USE OF INSULIN: ICD-10-CM

## 2022-12-01 DIAGNOSIS — J45.901 EXACERBATION OF ASTHMA, UNSPECIFIED ASTHMA SEVERITY, UNSPECIFIED WHETHER PERSISTENT: ICD-10-CM

## 2022-12-01 DIAGNOSIS — R05.9 COUGH, UNSPECIFIED TYPE: Primary | ICD-10-CM

## 2022-12-01 DIAGNOSIS — R09.81 NASAL CONGESTION: ICD-10-CM

## 2022-12-01 DIAGNOSIS — E11.69 TYPE 2 DIABETES MELLITUS WITH OTHER SPECIFIED COMPLICATION, WITH LONG-TERM CURRENT USE OF INSULIN: ICD-10-CM

## 2022-12-01 LAB
CTP QC/QA: YES
CTP QC/QA: YES
FLUAV AG NPH QL: NEGATIVE
FLUBV AG NPH QL: NEGATIVE
SARS-COV-2 AG RESP QL IA.RAPID: NEGATIVE

## 2022-12-01 PROCEDURE — 3074F SYST BP LT 130 MM HG: CPT | Mod: CPTII,,, | Performed by: NURSE PRACTITIONER

## 2022-12-01 PROCEDURE — 3066F PR DOCUMENTATION OF TREATMENT FOR NEPHROPATHY: ICD-10-PCS | Mod: CPTII,,, | Performed by: NURSE PRACTITIONER

## 2022-12-01 PROCEDURE — 87804 INFLUENZA ASSAY W/OPTIC: CPT | Mod: 59,QW,, | Performed by: NURSE PRACTITIONER

## 2022-12-01 PROCEDURE — 1160F PR REVIEW ALL MEDS BY PRESCRIBER/CLIN PHARMACIST DOCUMENTED: ICD-10-PCS | Mod: CPTII,,, | Performed by: NURSE PRACTITIONER

## 2022-12-01 PROCEDURE — 3008F PR BODY MASS INDEX (BMI) DOCUMENTED: ICD-10-PCS | Mod: CPTII,,, | Performed by: NURSE PRACTITIONER

## 2022-12-01 PROCEDURE — 1159F PR MEDICATION LIST DOCUMENTED IN MEDICAL RECORD: ICD-10-PCS | Mod: CPTII,,, | Performed by: NURSE PRACTITIONER

## 2022-12-01 PROCEDURE — 87426 SARSCOV CORONAVIRUS AG IA: CPT | Mod: QW,,, | Performed by: NURSE PRACTITIONER

## 2022-12-01 PROCEDURE — 87804 POCT INFLUENZA A/B: ICD-10-PCS | Mod: QW,,, | Performed by: NURSE PRACTITIONER

## 2022-12-01 PROCEDURE — 99214 OFFICE O/P EST MOD 30 MIN: CPT | Mod: 25,,, | Performed by: NURSE PRACTITIONER

## 2022-12-01 PROCEDURE — 3078F DIAST BP <80 MM HG: CPT | Mod: CPTII,,, | Performed by: NURSE PRACTITIONER

## 2022-12-01 PROCEDURE — 71046 X-RAY EXAM CHEST 2 VIEWS: CPT | Mod: TC

## 2022-12-01 PROCEDURE — 4010F PR ACE/ARB THEARPY RXD/TAKEN: ICD-10-PCS | Mod: CPTII,,, | Performed by: NURSE PRACTITIONER

## 2022-12-01 PROCEDURE — 1160F RVW MEDS BY RX/DR IN RCRD: CPT | Mod: CPTII,,, | Performed by: NURSE PRACTITIONER

## 2022-12-01 PROCEDURE — 3078F PR MOST RECENT DIASTOLIC BLOOD PRESSURE < 80 MM HG: ICD-10-PCS | Mod: CPTII,,, | Performed by: NURSE PRACTITIONER

## 2022-12-01 PROCEDURE — 96372 PR INJECTION,THERAP/PROPH/DIAG2ST, IM OR SUBCUT: ICD-10-PCS | Mod: ,,, | Performed by: NURSE PRACTITIONER

## 2022-12-01 PROCEDURE — 3008F BODY MASS INDEX DOCD: CPT | Mod: CPTII,,, | Performed by: NURSE PRACTITIONER

## 2022-12-01 PROCEDURE — 3074F PR MOST RECENT SYSTOLIC BLOOD PRESSURE < 130 MM HG: ICD-10-PCS | Mod: CPTII,,, | Performed by: NURSE PRACTITIONER

## 2022-12-01 PROCEDURE — 99214 PR OFFICE/OUTPT VISIT, EST, LEVL IV, 30-39 MIN: ICD-10-PCS | Mod: 25,,, | Performed by: NURSE PRACTITIONER

## 2022-12-01 PROCEDURE — 71046 X-RAY EXAM CHEST 2 VIEWS: CPT | Mod: 26,,, | Performed by: RADIOLOGY

## 2022-12-01 PROCEDURE — 87426 SARS CORONAVIRUS 2 ANTIGEN POCT: ICD-10-PCS | Mod: QW,,, | Performed by: NURSE PRACTITIONER

## 2022-12-01 PROCEDURE — 3044F HG A1C LEVEL LT 7.0%: CPT | Mod: CPTII,,, | Performed by: NURSE PRACTITIONER

## 2022-12-01 PROCEDURE — 71046 XR CHEST PA AND LATERAL: ICD-10-PCS | Mod: 26,,, | Performed by: RADIOLOGY

## 2022-12-01 PROCEDURE — 96372 THER/PROPH/DIAG INJ SC/IM: CPT | Mod: ,,, | Performed by: NURSE PRACTITIONER

## 2022-12-01 PROCEDURE — 3044F PR MOST RECENT HEMOGLOBIN A1C LEVEL <7.0%: ICD-10-PCS | Mod: CPTII,,, | Performed by: NURSE PRACTITIONER

## 2022-12-01 PROCEDURE — 3061F PR NEG MICROALBUMINURIA RESULT DOCUMENTED/REVIEW: ICD-10-PCS | Mod: CPTII,,, | Performed by: NURSE PRACTITIONER

## 2022-12-01 PROCEDURE — 4010F ACE/ARB THERAPY RXD/TAKEN: CPT | Mod: CPTII,,, | Performed by: NURSE PRACTITIONER

## 2022-12-01 PROCEDURE — 3066F NEPHROPATHY DOC TX: CPT | Mod: CPTII,,, | Performed by: NURSE PRACTITIONER

## 2022-12-01 PROCEDURE — 3061F NEG MICROALBUMINURIA REV: CPT | Mod: CPTII,,, | Performed by: NURSE PRACTITIONER

## 2022-12-01 PROCEDURE — 1159F MED LIST DOCD IN RCRD: CPT | Mod: CPTII,,, | Performed by: NURSE PRACTITIONER

## 2022-12-01 RX ORDER — DEXAMETHASONE SODIUM PHOSPHATE 4 MG/ML
4 INJECTION, SOLUTION INTRA-ARTICULAR; INTRALESIONAL; INTRAMUSCULAR; INTRAVENOUS; SOFT TISSUE
Status: COMPLETED | OUTPATIENT
Start: 2022-12-01 | End: 2022-12-01

## 2022-12-01 RX ORDER — ATORVASTATIN CALCIUM 10 MG/1
10 TABLET, FILM COATED ORAL DAILY
COMMUNITY
Start: 2022-06-22 | End: 2023-11-15

## 2022-12-01 RX ORDER — CEFTRIAXONE 1 G/1
1 INJECTION, POWDER, FOR SOLUTION INTRAMUSCULAR; INTRAVENOUS
Status: COMPLETED | OUTPATIENT
Start: 2022-12-01 | End: 2022-12-01

## 2022-12-01 RX ADMIN — DEXAMETHASONE SODIUM PHOSPHATE 4 MG: 4 INJECTION, SOLUTION INTRA-ARTICULAR; INTRALESIONAL; INTRAMUSCULAR; INTRAVENOUS; SOFT TISSUE at 10:12

## 2022-12-01 RX ADMIN — CEFTRIAXONE 1 G: 1 INJECTION, POWDER, FOR SOLUTION INTRAMUSCULAR; INTRAVENOUS at 10:12

## 2022-12-01 NOTE — PROGRESS NOTES
ROGELIO Pacheco   [facility]      PATIENT NAME: Jaan Lopez  : 1965  DATE: 22  MRN: [id number]      Patient PCP Information       Provider PCP Type    ROGELIO Pacheco General            Reason for Visit / Chief Complaint: Cough (Room 6///) and Nasal Congestion           History of Present Illness / Problem Focused Workflow     Jana Lopez presents to the clinic with Cough (Room 6///) and Nasal Congestion     HPI  Ms Lopez presents to clinic with continued cough, nasal congestion and wheezing.   Patient with recent viral illness. Patient has hx of asthma and inflammatory arthritis on immunosuppressant therapy.   Patient reports productive cough. Fatigue    Review of Systems     Review of Systems   Constitutional:  Positive for fatigue. Negative for activity change, appetite change, chills, diaphoresis, fever and unexpected weight change.   HENT:  Positive for congestion, rhinorrhea and sinus pressure. Negative for ear pain, facial swelling, hearing loss, nosebleeds and sore throat.    Respiratory:  Positive for cough and wheezing. Negative for apnea and shortness of breath.    Cardiovascular:  Negative for chest pain, palpitations and leg swelling.   Gastrointestinal:  Negative for abdominal distention, abdominal pain, blood in stool, constipation, diarrhea and nausea.   Endocrine: Negative for cold intolerance, heat intolerance, polydipsia, polyphagia and polyuria.   Genitourinary:  Negative for decreased urine volume, difficulty urinating, dysuria, flank pain, frequency, hematuria and urgency.   Musculoskeletal:  Positive for arthralgias. Negative for joint swelling and myalgias.   Skin:  Negative for color change and rash.   Allergic/Immunologic: Negative.    Neurological:  Negative for dizziness, tremors, seizures, syncope, facial asymmetry, speech difficulty, weakness, light-headedness, numbness and headaches.   Hematological:  Negative for adenopathy. Does not  "bruise/bleed easily.   Psychiatric/Behavioral:  Negative for behavioral problems and confusion.      Medical / Social / Family History     Past Medical History:   Diagnosis Date    Asthma     Dyslipidemia     Hypertension     HIRA (obstructive sleep apnea)     Rheumatoid arthritis     SVT (supraventricular tachycardia)     [facility]       Past Surgical History:   Procedure Laterality Date    CHOLECYSTECTOMY      HYSTERECTOMY      LITHOTRIPSY         Social History  Ms.  reports that she has never smoked. She has never used smokeless tobacco. She reports that she does not currently use alcohol. She reports that she does not use drugs.    Family History  Ms.'s family history includes Diabetes in an other family member; Heart disease in her father and mother; Hypertension in her daughter and father; Stroke in an other family member.    Medications and Allergies     Medications  No outpatient medications have been marked as taking for the 12/1/22 encounter (Office Visit) with ROGELIO Pacheco.       Allergies  Review of patient's allergies indicates:   Allergen Reactions    Lisinopril Other (See Comments)     cough       Physical Examination     Vitals:    12/01/22 0809   BP: 118/71   BP Location: Right arm   Patient Position: Sitting   Pulse: 77   Resp: 18   Temp: 98.2 °F (36.8 °C)   SpO2: 97%   Weight: 90.9 kg (200 lb 8 oz)   Height: 5' 2" (1.575 m)       Physical Exam  Vitals reviewed.   Constitutional:       Appearance: Normal appearance.   HENT:      Head: Normocephalic.      Right Ear: External ear normal.      Left Ear: External ear normal.      Nose: Congestion and rhinorrhea present.      Mouth/Throat:      Mouth: Mucous membranes are moist.      Pharynx: No oropharyngeal exudate.   Eyes:      Extraocular Movements: Extraocular movements intact.   Cardiovascular:      Rate and Rhythm: Normal rate.      Pulses: Normal pulses.      Heart sounds: Normal heart sounds.   Pulmonary:      " Effort: Pulmonary effort is normal. No respiratory distress.      Breath sounds: No stridor. Wheezing and rhonchi present. No rales.   Chest:      Chest wall: No tenderness.   Musculoskeletal:         General: Normal range of motion.      Cervical back: Normal range of motion.   Skin:     General: Skin is warm and dry.      Capillary Refill: Capillary refill takes less than 2 seconds.   Neurological:      General: No focal deficit present.      Mental Status: She is alert and oriented to person, place, and time.   Psychiatric:         Mood and Affect: Mood normal.         Behavior: Behavior normal.         Thought Content: Thought content normal.         Judgment: Judgment normal.         Office Visit on 12/01/2022   Component Date Value Ref Range Status    Rapid Influenza A Ag 12/01/2022 Negative  Negative Final    Rapid Influenza B Ag 12/01/2022 Negative  Negative Final     Acceptable 12/01/2022 Yes   Final    SARS Coronavirus 2 Antigen 12/01/2022 Negative  Negative Final     Acceptable 12/01/2022 Yes   Final             Assessment and Plan (including Health Maintenance)      Problem List  Smart Sets  Document Outside HM   :    Plan:   Cough, unspecified type  -     POCT Influenza A/B  -     SARS Coronavirus 2 Antigen, POCT    Nasal congestion  -     POCT Influenza A/B  -     SARS Coronavirus 2 Antigen, POCT    Type 2 diabetes mellitus with other specified complication, with long-term current use of insulin  -     Hemoglobin A1C; Future; Expected date: 12/01/2022    Exacerbation of asthma, unspecified asthma severity, unspecified whether persistent  -     X-Ray Chest PA And Lateral; Future; Expected date: 12/01/2022  -     dexAMETHasone injection 4 mg  -     cefTRIAXone injection 1 g   RTC as scheduled     There are no Patient Instructions on file for this visit.       Health Maintenance Due   Topic Date Due    Pneumococcal Vaccines (Age 0-64) (1 - PCV) Never done    TETANUS VACCINE   Never done    Shingles Vaccine (1 of 2) Never done    Colorectal Cancer Screening  Never done    COVID-19 Vaccine (4 - Booster for Pfizer series) 07/07/2022    Hemoglobin A1c  11/13/2022    Foot Exam  01/21/2023       Most Recent Immunizations   Administered Date(s) Administered    COVID-19, MRNA, LN-S, PF (Pfizer) (Purple Cap) 05/12/2022    Influenza - Quadrivalent - PF *Preferred* (6 months and older) 10/14/2022        Problem List Items Addressed This Visit    None  Visit Diagnoses       Cough, unspecified type    -  Primary    Relevant Orders    POCT Influenza A/B (Completed)    SARS Coronavirus 2 Antigen, POCT (Completed)    Nasal congestion        Relevant Orders    POCT Influenza A/B (Completed)    SARS Coronavirus 2 Antigen, POCT (Completed)    Type 2 diabetes mellitus with other specified complication, with long-term current use of insulin        Relevant Orders    Hemoglobin A1C    Exacerbation of asthma, unspecified asthma severity, unspecified whether persistent        Relevant Medications    dexAMETHasone injection 4 mg (Completed)    cefTRIAXone injection 1 g (Completed)    Other Relevant Orders    X-Ray Chest PA And Lateral (Completed)            Health Maintenance Topics with due status: Not Due       Topic Last Completion Date    Mammogram 03/26/2021    Lipid Panel 01/21/2022    Eye Exam 03/23/2022    Diabetes Urine Screening 09/26/2022    Low Dose Statin 10/24/2022       Future Appointments   Date Time Provider Department Center   12/27/2022 10:00 AM ROGELIO Pacheco [facility]            Signature:  ROGELIO Pacheco  [facility]     Date of encounter: 12/1/22

## 2022-12-06 ENCOUNTER — OFFICE VISIT (OUTPATIENT)
Dept: FAMILY MEDICINE | Facility: CLINIC | Age: 57
End: 2022-12-06
Payer: COMMERCIAL

## 2022-12-06 VITALS
HEIGHT: 62 IN | WEIGHT: 198.25 LBS | BODY MASS INDEX: 36.48 KG/M2 | TEMPERATURE: 99 F | SYSTOLIC BLOOD PRESSURE: 124 MMHG | OXYGEN SATURATION: 96 % | RESPIRATION RATE: 19 BRPM | HEART RATE: 85 BPM | DIASTOLIC BLOOD PRESSURE: 77 MMHG

## 2022-12-06 DIAGNOSIS — R06.2 WHEEZING: ICD-10-CM

## 2022-12-06 DIAGNOSIS — J06.9 UPPER RESPIRATORY TRACT INFECTION, UNSPECIFIED TYPE: ICD-10-CM

## 2022-12-06 DIAGNOSIS — E11.69 TYPE 2 DIABETES MELLITUS WITH OTHER SPECIFIED COMPLICATION, WITH LONG-TERM CURRENT USE OF INSULIN: ICD-10-CM

## 2022-12-06 DIAGNOSIS — Z79.4 TYPE 2 DIABETES MELLITUS WITH OTHER SPECIFIED COMPLICATION, WITH LONG-TERM CURRENT USE OF INSULIN: ICD-10-CM

## 2022-12-06 DIAGNOSIS — R05.8 PRODUCTIVE COUGH: Primary | ICD-10-CM

## 2022-12-06 LAB
BASOPHILS # BLD AUTO: 0.05 K/UL (ref 0–0.2)
BASOPHILS NFR BLD AUTO: 0.6 % (ref 0–1)
DIFFERENTIAL METHOD BLD: ABNORMAL
EOSINOPHIL # BLD AUTO: 0.51 K/UL (ref 0–0.5)
EOSINOPHIL NFR BLD AUTO: 5.8 % (ref 1–4)
ERYTHROCYTE [DISTWIDTH] IN BLOOD BY AUTOMATED COUNT: 15.4 % (ref 11.5–14.5)
EST. AVERAGE GLUCOSE BLD GHB EST-MCNC: 130 MG/DL
HBA1C MFR BLD HPLC: 6.5 % (ref 4.5–6.6)
HCT VFR BLD AUTO: 35.9 % (ref 38–47)
HGB BLD-MCNC: 12.3 G/DL (ref 12–16)
IMM GRANULOCYTES # BLD AUTO: 0.04 K/UL (ref 0–0.04)
IMM GRANULOCYTES NFR BLD: 0.5 % (ref 0–0.4)
LYMPHOCYTES # BLD AUTO: 3.69 K/UL (ref 1–4.8)
LYMPHOCYTES NFR BLD AUTO: 42.2 % (ref 27–41)
MCH RBC QN AUTO: 25.7 PG (ref 27–31)
MCHC RBC AUTO-ENTMCNC: 34.3 G/DL (ref 32–36)
MCV RBC AUTO: 75.1 FL (ref 80–96)
MONOCYTES # BLD AUTO: 0.62 K/UL (ref 0–0.8)
MONOCYTES NFR BLD AUTO: 7.1 % (ref 2–6)
MPC BLD CALC-MCNC: 11.9 FL (ref 9.4–12.4)
NEUTROPHILS # BLD AUTO: 3.83 K/UL (ref 1.8–7.7)
NEUTROPHILS NFR BLD AUTO: 43.8 % (ref 53–65)
NRBC # BLD AUTO: 0 X10E3/UL
NRBC, AUTO (.00): 0 %
NT-PROBNP SERPL-MCNC: 95 PG/ML (ref 1–125)
PLATELET # BLD AUTO: 227 K/UL (ref 150–400)
RBC # BLD AUTO: 4.78 M/UL (ref 4.2–5.4)
WBC # BLD AUTO: 8.74 K/UL (ref 4.5–11)

## 2022-12-06 PROCEDURE — 83880 NT-PRO NATRIURETIC PEPTIDE: ICD-10-PCS | Mod: ,,, | Performed by: CLINICAL MEDICAL LABORATORY

## 2022-12-06 PROCEDURE — 85025 COMPLETE CBC W/AUTO DIFF WBC: CPT | Mod: ,,, | Performed by: CLINICAL MEDICAL LABORATORY

## 2022-12-06 PROCEDURE — 96372 THER/PROPH/DIAG INJ SC/IM: CPT | Mod: 59,,, | Performed by: NURSE PRACTITIONER

## 2022-12-06 PROCEDURE — 83036 HEMOGLOBIN GLYCOSYLATED A1C: CPT | Mod: ,,, | Performed by: CLINICAL MEDICAL LABORATORY

## 2022-12-06 PROCEDURE — 3044F HG A1C LEVEL LT 7.0%: CPT | Mod: CPTII,,, | Performed by: NURSE PRACTITIONER

## 2022-12-06 PROCEDURE — 1160F RVW MEDS BY RX/DR IN RCRD: CPT | Mod: CPTII,,, | Performed by: NURSE PRACTITIONER

## 2022-12-06 PROCEDURE — 3044F PR MOST RECENT HEMOGLOBIN A1C LEVEL <7.0%: ICD-10-PCS | Mod: CPTII,,, | Performed by: NURSE PRACTITIONER

## 2022-12-06 PROCEDURE — 4010F PR ACE/ARB THEARPY RXD/TAKEN: ICD-10-PCS | Mod: CPTII,,, | Performed by: NURSE PRACTITIONER

## 2022-12-06 PROCEDURE — 3061F NEG MICROALBUMINURIA REV: CPT | Mod: CPTII,,, | Performed by: NURSE PRACTITIONER

## 2022-12-06 PROCEDURE — 99214 OFFICE O/P EST MOD 30 MIN: CPT | Mod: 25,,, | Performed by: NURSE PRACTITIONER

## 2022-12-06 PROCEDURE — 36415 PR COLLECTION VENOUS BLOOD,VENIPUNCTURE: ICD-10-PCS | Mod: ,,, | Performed by: CLINICAL MEDICAL LABORATORY

## 2022-12-06 PROCEDURE — 3074F PR MOST RECENT SYSTOLIC BLOOD PRESSURE < 130 MM HG: ICD-10-PCS | Mod: CPTII,,, | Performed by: NURSE PRACTITIONER

## 2022-12-06 PROCEDURE — 1160F PR REVIEW ALL MEDS BY PRESCRIBER/CLIN PHARMACIST DOCUMENTED: ICD-10-PCS | Mod: CPTII,,, | Performed by: NURSE PRACTITIONER

## 2022-12-06 PROCEDURE — 3066F PR DOCUMENTATION OF TREATMENT FOR NEPHROPATHY: ICD-10-PCS | Mod: CPTII,,, | Performed by: NURSE PRACTITIONER

## 2022-12-06 PROCEDURE — 83036 HEMOGLOBIN A1C: ICD-10-PCS | Mod: ,,, | Performed by: CLINICAL MEDICAL LABORATORY

## 2022-12-06 PROCEDURE — 94640 AIRWAY INHALATION TREATMENT: CPT | Mod: ,,, | Performed by: NURSE PRACTITIONER

## 2022-12-06 PROCEDURE — 4010F ACE/ARB THERAPY RXD/TAKEN: CPT | Mod: CPTII,,, | Performed by: NURSE PRACTITIONER

## 2022-12-06 PROCEDURE — 36415 COLL VENOUS BLD VENIPUNCTURE: CPT | Mod: ,,, | Performed by: CLINICAL MEDICAL LABORATORY

## 2022-12-06 PROCEDURE — 3008F PR BODY MASS INDEX (BMI) DOCUMENTED: ICD-10-PCS | Mod: CPTII,,, | Performed by: NURSE PRACTITIONER

## 2022-12-06 PROCEDURE — 1159F MED LIST DOCD IN RCRD: CPT | Mod: CPTII,,, | Performed by: NURSE PRACTITIONER

## 2022-12-06 PROCEDURE — 83880 ASSAY OF NATRIURETIC PEPTIDE: CPT | Mod: ,,, | Performed by: CLINICAL MEDICAL LABORATORY

## 2022-12-06 PROCEDURE — 96372 PR INJECTION,THERAP/PROPH/DIAG2ST, IM OR SUBCUT: ICD-10-PCS | Mod: 59,,, | Performed by: NURSE PRACTITIONER

## 2022-12-06 PROCEDURE — 3061F PR NEG MICROALBUMINURIA RESULT DOCUMENTED/REVIEW: ICD-10-PCS | Mod: CPTII,,, | Performed by: NURSE PRACTITIONER

## 2022-12-06 PROCEDURE — 3008F BODY MASS INDEX DOCD: CPT | Mod: CPTII,,, | Performed by: NURSE PRACTITIONER

## 2022-12-06 PROCEDURE — 99214 PR OFFICE/OUTPT VISIT, EST, LEVL IV, 30-39 MIN: ICD-10-PCS | Mod: 25,,, | Performed by: NURSE PRACTITIONER

## 2022-12-06 PROCEDURE — 94640 PR INHAL RX, AIRWAY OBST/DX SPUTUM INDUCT: ICD-10-PCS | Mod: ,,, | Performed by: NURSE PRACTITIONER

## 2022-12-06 PROCEDURE — 1159F PR MEDICATION LIST DOCUMENTED IN MEDICAL RECORD: ICD-10-PCS | Mod: CPTII,,, | Performed by: NURSE PRACTITIONER

## 2022-12-06 PROCEDURE — 3066F NEPHROPATHY DOC TX: CPT | Mod: CPTII,,, | Performed by: NURSE PRACTITIONER

## 2022-12-06 PROCEDURE — 3078F PR MOST RECENT DIASTOLIC BLOOD PRESSURE < 80 MM HG: ICD-10-PCS | Mod: CPTII,,, | Performed by: NURSE PRACTITIONER

## 2022-12-06 PROCEDURE — 85025 CBC WITH DIFFERENTIAL: ICD-10-PCS | Mod: ,,, | Performed by: CLINICAL MEDICAL LABORATORY

## 2022-12-06 PROCEDURE — 3078F DIAST BP <80 MM HG: CPT | Mod: CPTII,,, | Performed by: NURSE PRACTITIONER

## 2022-12-06 PROCEDURE — 3074F SYST BP LT 130 MM HG: CPT | Mod: CPTII,,, | Performed by: NURSE PRACTITIONER

## 2022-12-06 RX ORDER — LEVOFLOXACIN 500 MG/1
750 TABLET, FILM COATED ORAL DAILY
Qty: 11 TABLET | Refills: 0 | Status: SHIPPED | OUTPATIENT
Start: 2022-12-06 | End: 2022-12-13

## 2022-12-06 RX ORDER — BENZONATATE 100 MG/1
100 CAPSULE ORAL 3 TIMES DAILY PRN
Qty: 30 CAPSULE | Refills: 0 | Status: SHIPPED | OUTPATIENT
Start: 2022-12-06 | End: 2022-12-16

## 2022-12-06 RX ORDER — METHYLPREDNISOLONE 4 MG/1
TABLET ORAL
Qty: 21 EACH | Refills: 0 | Status: SHIPPED | OUTPATIENT
Start: 2022-12-06 | End: 2022-12-27

## 2022-12-06 RX ORDER — ALBUTEROL SULFATE 0.83 MG/ML
2.5 SOLUTION RESPIRATORY (INHALATION) EVERY 4 HOURS PRN
Qty: 60 ML | Refills: 1 | Status: SHIPPED | OUTPATIENT
Start: 2022-12-06

## 2022-12-06 RX ORDER — ALBUTEROL SULFATE 0.83 MG/ML
2.5 SOLUTION RESPIRATORY (INHALATION)
Status: COMPLETED | OUTPATIENT
Start: 2022-12-06 | End: 2022-12-06

## 2022-12-06 RX ADMIN — ALBUTEROL SULFATE 2.5 MG: 0.83 SOLUTION RESPIRATORY (INHALATION) at 12:12

## 2022-12-06 NOTE — PROGRESS NOTES
ROGELIO Pacheco   [facility]      PATIENT NAME: Jana Lopez  : 1965  DATE: 22  MRN: [id number]      Patient PCP Information       Provider PCP Type    ROGELIO Pacheco General            Reason for Visit / Chief Complaint: Cough (Room 4///)         History of Present Illness / Problem Focused Workflow     Jana Lopez presents to the clinic with Cough (Room 4///)     HPI  Ms Lopez presents to clinic with worsening cough an congestion. On last appt patient received IM steroid and rocephin, patient was to continued albuterol nebs  every 4-6 hours.   Patient has not been using nebs as frequently as needed. Patient last neb yesterday evening. Patient with continued wheezing and constant cough.     Review of Systems     Review of Systems   Constitutional:  Positive for fatigue.   HENT:  Positive for congestion.    Eyes: Negative.    Respiratory:  Positive for cough and wheezing.    Cardiovascular: Negative.    Gastrointestinal: Negative.    Endocrine: Negative.    Genitourinary: Negative.    Musculoskeletal: Negative.    Skin: Negative.    Allergic/Immunologic: Negative.    Neurological: Negative.    Hematological: Negative.    Psychiatric/Behavioral: Negative.       Medical / Social / Family History     Past Medical History:   Diagnosis Date    Asthma     Dyslipidemia     Hypertension     HIRA (obstructive sleep apnea)     Rheumatoid arthritis     SVT (supraventricular tachycardia)     followed at [facility],        Past Surgical History:   Procedure Laterality Date    CHOLECYSTECTOMY      HYSTERECTOMY      LITHOTRIPSY         Social History  Ms.  reports that she has never smoked. She has never used smokeless tobacco. She reports that she does not currently use alcohol. She reports that she does not use drugs.    Family History  Ms.'s family history includes Diabetes in an other family member; Heart disease in her father and mother; Hypertension in her daughter and father;  "Stroke in an other family member.    Medications and Allergies     Medications  No outpatient medications have been marked as taking for the 12/6/22 encounter (Office Visit) with ROGELIO Pacheco.       Allergies  Review of patient's allergies indicates:   Allergen Reactions    Lisinopril Other (See Comments)     cough       Physical Examination     Vitals:    12/06/22 1118   BP: 124/77   BP Location: Right arm   Patient Position: Sitting   Pulse: 85   Resp: 19   Temp: 98.7 °F (37.1 °C)   SpO2: 96%   Weight: 89.9 kg (198 lb 4 oz)   Height: 5' 2" (1.575 m)       Physical Exam  Vitals reviewed.   Constitutional:       Appearance: She is ill-appearing.   HENT:      Head: Normocephalic.      Right Ear: External ear normal.      Left Ear: External ear normal.      Nose: Congestion present.      Mouth/Throat:      Mouth: Mucous membranes are moist.   Eyes:      Extraocular Movements: Extraocular movements intact.   Cardiovascular:      Rate and Rhythm: Regular rhythm.   Pulmonary:      Effort: No respiratory distress.      Breath sounds: No stridor. Wheezing present.      Comments: Frequent coughing   Chest:      Chest wall: Tenderness present.   Musculoskeletal:      Cervical back: Normal range of motion.   Skin:     General: Skin is warm and dry.      Capillary Refill: Capillary refill takes less than 2 seconds.   Neurological:      General: No focal deficit present.      Mental Status: She is oriented to person, place, and time.   Psychiatric:         Mood and Affect: Mood normal.         Behavior: Behavior normal.         Thought Content: Thought content normal.         Judgment: Judgment normal.         Office Visit on 12/06/2022   Component Date Value Ref Range Status    Hemoglobin A1C 12/06/2022 6.5  4.5 - 6.6 % Final      Normal:               <5.7%  Pre-Diabetic:       5.7% to 6.4%  Diabetic:             >6.4%  Diabetic Goal:     <7%    Estimated Average Glucose 12/06/2022 130  mg/dL Final    ProBNP " 12/06/2022 95  1 - 125 pg/mL Final    WBC 12/06/2022 8.74  4.50 - 11.00 K/uL Final    RBC 12/06/2022 4.78  4.20 - 5.40 M/uL Final    Hemoglobin 12/06/2022 12.3  12.0 - 16.0 g/dL Final    Hematocrit 12/06/2022 35.9 (L)  38.0 - 47.0 % Final    MCV 12/06/2022 75.1 (L)  80.0 - 96.0 fL Final    MCH 12/06/2022 25.7 (L)  27.0 - 31.0 pg Final    MCHC 12/06/2022 34.3  32.0 - 36.0 g/dL Final    RDW 12/06/2022 15.4 (H)  11.5 - 14.5 % Final    Platelet Count 12/06/2022 227  150 - 400 K/uL Final    MPV 12/06/2022 11.9  9.4 - 12.4 fL Final    Neutrophils % 12/06/2022 43.8 (L)  53.0 - 65.0 % Final    Lymphocytes % 12/06/2022 42.2 (H)  27.0 - 41.0 % Final    Monocytes % 12/06/2022 7.1 (H)  2.0 - 6.0 % Final    Eosinophils % 12/06/2022 5.8 (H)  1.0 - 4.0 % Final    Basophils % 12/06/2022 0.6  0.0 - 1.0 % Final    Immature Granulocytes % 12/06/2022 0.5 (H)  0.0 - 0.4 % Final    nRBC, Auto 12/06/2022 0.0  <=0.0 % Final    Neutrophils, Abs 12/06/2022 3.83  1.80 - 7.70 K/uL Final    Lymphocytes, Absolute 12/06/2022 3.69  1.00 - 4.80 K/uL Final    Monocytes, Absolute 12/06/2022 0.62  0.00 - 0.80 K/uL Final    Eosinophils, Absolute 12/06/2022 0.51 (H)  0.00 - 0.50 K/uL Final    Basophils, Absolute 12/06/2022 0.05  0.00 - 0.20 K/uL Final    Immature Granulocytes, Absolute 12/06/2022 0.04  0.00 - 0.04 K/uL Final    nRBC, Absolute 12/06/2022 0.00  <=0.00 x10e3/uL Final    Diff Type 12/06/2022 Auto   Final   Office Visit on 12/01/2022   Component Date Value Ref Range Status    Rapid Influenza A Ag 12/01/2022 Negative  Negative Final    Rapid Influenza B Ag 12/01/2022 Negative  Negative Final     Acceptable 12/01/2022 Yes   Final    SARS Coronavirus 2 Antigen 12/01/2022 Negative  Negative Final     Acceptable 12/01/2022 Yes   Final             Assessment and Plan (including Health Maintenance)       Plan:   Productive cough  -     NT-Pro Natriuretic Peptide; Future; Expected date: 12/06/2022  -      tessalon  -       steroid and nebs     Wheezing  -     NT-Pro Natriuretic Peptide; Future; Expected date: 12/06/2022       -     steroid and nebs continued     Upper respiratory tract infection, unspecified type  -     levoFLOXacin (LEVAQUIN) 500 MG tablet; Take 1.5 tablets (750 mg total) by mouth once daily. for 7 days  Dispense: 11 tablet; Refill: 0  -     albuterol (PROVENTIL) 2.5 mg /3 mL (0.083 %) nebulizer solution; Take 3 mLs (2.5 mg total) by nebulization every 4 (four) hours as needed for Wheezing.  Dispense: 60 mL; Refill: 1  -     CBC Auto Differential; Future; Expected date: 12/06/2022  -     benzonatate (TESSALON) 100 MG capsule; Take 1 capsule (100 mg total) by mouth 3 (three) times daily as needed for Cough.  Dispense: 30 capsule; Refill: 0  -     albuterol nebulizer solution 2.5 mg  -     methylPREDNISolone (MEDROL DOSEPACK) 4 mg tablet; use as directed  Dispense: 21 each; Refill: 0  -     methylPREDNISolone sod suc(PF) injection 125 mg    Type 2 diabetes mellitus with other specified complication, with long-term current use of insulin  -     Hemoglobin A1C    RTC in 2 days for recheck        There are no Patient Instructions on file for this visit.       Health Maintenance Due   Topic Date Due    Pneumococcal Vaccines (Age 0-64) (1 - PCV) Never done    TETANUS VACCINE  Never done    Shingles Vaccine (1 of 2) Never done    Colorectal Cancer Screening  Never done    COVID-19 Vaccine (4 - Booster for Pfizer series) 07/07/2022    Foot Exam  01/21/2023       Most Recent Immunizations   Administered Date(s) Administered    COVID-19, MRNA, LN-S, PF (Pfizer) (Purple Cap) 05/12/2022    Influenza - Quadrivalent - PF *Preferred* (6 months and older) 10/14/2022        Problem List Items Addressed This Visit    None  Visit Diagnoses       Productive cough    -  Primary    Relevant Orders    NT-Pro Natriuretic Peptide (Completed)    Wheezing        Relevant Orders    NT-Pro Natriuretic Peptide (Completed)    Upper respiratory  tract infection, unspecified type        Relevant Medications    levoFLOXacin (LEVAQUIN) 500 MG tablet    albuterol (PROVENTIL) 2.5 mg /3 mL (0.083 %) nebulizer solution    benzonatate (TESSALON) 100 MG capsule    albuterol nebulizer solution 2.5 mg (Completed)    methylPREDNISolone (MEDROL DOSEPACK) 4 mg tablet    methylPREDNISolone sod suc(PF) injection 125 mg (Completed)    Other Relevant Orders    CBC Auto Differential (Completed)    Type 2 diabetes mellitus with other specified complication, with long-term current use of insulin                Health Maintenance Topics with due status: Not Due       Topic Last Completion Date    Mammogram 03/26/2021    Lipid Panel 01/21/2022    Eye Exam 03/23/2022    Diabetes Urine Screening 09/26/2022    Low Dose Statin 10/24/2022    Hemoglobin A1c 12/06/2022       Future Appointments   Date Time Provider Department Center   12/27/2022 10:00 AM ROGELIO Pacheco [facility]   12/29/2022  8:45 AM Luis Miramontes MD [facility]            Signature:  ROGELIO Pacheco  [facility]     Date of encounter: 12/6/22

## 2022-12-08 RX ORDER — NYSTATIN 100000 [USP'U]/ML
6 SUSPENSION ORAL 4 TIMES DAILY
Qty: 240 ML | Refills: 0 | Status: SHIPPED | OUTPATIENT
Start: 2022-12-08 | End: 2022-12-18

## 2022-12-08 NOTE — PROGRESS NOTES
Patient called instead of returning for follow up. Informed RN steroid, antibiotic and neb were helping cough however she reports thrush to mouth. RN notified patient follow up was needed as soon as able to reevaluated respiratory status. Nystatin swish to be called in for thrush.

## 2022-12-09 ENCOUNTER — OFFICE VISIT (OUTPATIENT)
Dept: FAMILY MEDICINE | Facility: CLINIC | Age: 57
End: 2022-12-09
Payer: COMMERCIAL

## 2022-12-09 VITALS
DIASTOLIC BLOOD PRESSURE: 64 MMHG | TEMPERATURE: 99 F | OXYGEN SATURATION: 97 % | RESPIRATION RATE: 18 BRPM | BODY MASS INDEX: 36.67 KG/M2 | SYSTOLIC BLOOD PRESSURE: 130 MMHG | WEIGHT: 199.25 LBS | HEIGHT: 62 IN | HEART RATE: 73 BPM

## 2022-12-09 DIAGNOSIS — J45.901 EXACERBATION OF ASTHMA, UNSPECIFIED ASTHMA SEVERITY, UNSPECIFIED WHETHER PERSISTENT: Primary | ICD-10-CM

## 2022-12-09 DIAGNOSIS — J06.9 UPPER RESPIRATORY TRACT INFECTION, UNSPECIFIED TYPE: ICD-10-CM

## 2022-12-09 PROCEDURE — 4010F ACE/ARB THERAPY RXD/TAKEN: CPT | Mod: CPTII,,, | Performed by: NURSE PRACTITIONER

## 2022-12-09 PROCEDURE — 1160F RVW MEDS BY RX/DR IN RCRD: CPT | Mod: CPTII,,, | Performed by: NURSE PRACTITIONER

## 2022-12-09 PROCEDURE — 1159F MED LIST DOCD IN RCRD: CPT | Mod: CPTII,,, | Performed by: NURSE PRACTITIONER

## 2022-12-09 PROCEDURE — 1160F PR REVIEW ALL MEDS BY PRESCRIBER/CLIN PHARMACIST DOCUMENTED: ICD-10-PCS | Mod: CPTII,,, | Performed by: NURSE PRACTITIONER

## 2022-12-09 PROCEDURE — 3075F SYST BP GE 130 - 139MM HG: CPT | Mod: CPTII,,, | Performed by: NURSE PRACTITIONER

## 2022-12-09 PROCEDURE — 3075F PR MOST RECENT SYSTOLIC BLOOD PRESS GE 130-139MM HG: ICD-10-PCS | Mod: CPTII,,, | Performed by: NURSE PRACTITIONER

## 2022-12-09 PROCEDURE — 3078F DIAST BP <80 MM HG: CPT | Mod: CPTII,,, | Performed by: NURSE PRACTITIONER

## 2022-12-09 PROCEDURE — 3008F PR BODY MASS INDEX (BMI) DOCUMENTED: ICD-10-PCS | Mod: CPTII,,, | Performed by: NURSE PRACTITIONER

## 2022-12-09 PROCEDURE — 3061F NEG MICROALBUMINURIA REV: CPT | Mod: CPTII,,, | Performed by: NURSE PRACTITIONER

## 2022-12-09 PROCEDURE — 1159F PR MEDICATION LIST DOCUMENTED IN MEDICAL RECORD: ICD-10-PCS | Mod: CPTII,,, | Performed by: NURSE PRACTITIONER

## 2022-12-09 PROCEDURE — 3066F NEPHROPATHY DOC TX: CPT | Mod: CPTII,,, | Performed by: NURSE PRACTITIONER

## 2022-12-09 PROCEDURE — 3078F PR MOST RECENT DIASTOLIC BLOOD PRESSURE < 80 MM HG: ICD-10-PCS | Mod: CPTII,,, | Performed by: NURSE PRACTITIONER

## 2022-12-09 PROCEDURE — 3008F BODY MASS INDEX DOCD: CPT | Mod: CPTII,,, | Performed by: NURSE PRACTITIONER

## 2022-12-09 PROCEDURE — 3061F PR NEG MICROALBUMINURIA RESULT DOCUMENTED/REVIEW: ICD-10-PCS | Mod: CPTII,,, | Performed by: NURSE PRACTITIONER

## 2022-12-09 PROCEDURE — 3066F PR DOCUMENTATION OF TREATMENT FOR NEPHROPATHY: ICD-10-PCS | Mod: CPTII,,, | Performed by: NURSE PRACTITIONER

## 2022-12-09 PROCEDURE — 4010F PR ACE/ARB THEARPY RXD/TAKEN: ICD-10-PCS | Mod: CPTII,,, | Performed by: NURSE PRACTITIONER

## 2022-12-09 PROCEDURE — 99212 PR OFFICE/OUTPT VISIT, EST, LEVL II, 10-19 MIN: ICD-10-PCS | Mod: ,,, | Performed by: NURSE PRACTITIONER

## 2022-12-09 PROCEDURE — 3044F PR MOST RECENT HEMOGLOBIN A1C LEVEL <7.0%: ICD-10-PCS | Mod: CPTII,,, | Performed by: NURSE PRACTITIONER

## 2022-12-09 PROCEDURE — 99212 OFFICE O/P EST SF 10 MIN: CPT | Mod: ,,, | Performed by: NURSE PRACTITIONER

## 2022-12-09 PROCEDURE — 3044F HG A1C LEVEL LT 7.0%: CPT | Mod: CPTII,,, | Performed by: NURSE PRACTITIONER

## 2022-12-09 NOTE — PROGRESS NOTES
ROGELIO Pacheco   [facility]      PATIENT NAME: Jana Lopez  : 1965  DATE: 22  MRN: [id number]      Patient PCP Information       Provider PCP Type    ROGELIO Pacheco General            Reason for Visit / Chief Complaint: Follow-up (Follow up /Room 8/)           History of Present Illness / Problem Focused Workflow     Jana Lopez presents to the clinic with Follow-up (Follow up /Room )     HPI  Ms Lopez is here for follow up today for URI and asthma exacerbation.   Patient states she just picked up antibiotic and steroid last night. Patient states she has been using albuterol nebs every 4 hours as recommended  Patient states sob cough and wheezing has improved some.   Glucose has been running high for last week. Patient has been taking 70/30 BID as recommended. Patient aware likely due to steroid required for asthma tx in last week.     Review of Systems     Review of Systems   Constitutional: Negative.    HENT:  Positive for congestion.    Eyes: Negative.    Respiratory:  Positive for cough, shortness of breath and wheezing.    Cardiovascular: Negative.    Gastrointestinal: Negative.    Endocrine: Negative.    Genitourinary: Negative.    Musculoskeletal: Negative.    Skin: Negative.    Allergic/Immunologic: Negative.    Neurological: Negative.    Hematological: Negative.    Psychiatric/Behavioral: Negative.       Medical / Social / Family History     Past Medical History:   Diagnosis Date    Asthma     Dyslipidemia     Hypertension     HIRA (obstructive sleep apnea)     Rheumatoid arthritis     SVT (supraventricular tachycardia)     followed at [facility],        Past Surgical History:   Procedure Laterality Date    CHOLECYSTECTOMY      HYSTERECTOMY      LITHOTRIPSY         Social History  Ms.  reports that she has never smoked. She has never used smokeless tobacco. She reports that she does not currently use alcohol. She reports that she does not use  "drugs.    Family History  Ms.'s family history includes Diabetes in an other family member; Heart disease in her father and mother; Hypertension in her daughter and father; Stroke in an other family member.    Medications and Allergies     Medications  Outpatient Medications Marked as Taking for the 12/9/22 encounter (Office Visit) with ROGELIO Pacheco   Medication Sig Dispense Refill    levoFLOXacin (LEVAQUIN) 500 MG tablet Take 1.5 tablets (750 mg total) by mouth once daily. for 7 days 11 tablet 0       Allergies  Review of patient's allergies indicates:   Allergen Reactions    Lisinopril Other (See Comments)     cough       Physical Examination     Vitals:    12/09/22 0819 12/09/22 0830   BP: (!) 151/87 130/64   BP Location: Right arm Left arm   Patient Position: Sitting Sitting   Pulse: 73    Resp: 18    Temp: 98.6 °F (37 °C)    SpO2: 97%    Weight: 90.4 kg (199 lb 4 oz)    Height: 5' 2" (1.575 m)        Physical Exam  Vitals reviewed.   Constitutional:       Appearance: Normal appearance.   HENT:      Head: Normocephalic.      Right Ear: External ear normal.      Nose: Congestion present.      Mouth/Throat:      Mouth: Mucous membranes are moist.   Eyes:      Extraocular Movements: Extraocular movements intact.   Cardiovascular:      Rate and Rhythm: Normal rate and regular rhythm.      Pulses: Normal pulses.      Heart sounds: Normal heart sounds.   Pulmonary:      Breath sounds: Wheezing present.      Comments: Patient not coughing as frequently as last appt. Wheezing remains present but not as severe. Coarse upper breath sound improved  Abdominal:      Palpations: Abdomen is soft.   Musculoskeletal:         General: Normal range of motion.      Cervical back: Normal range of motion.   Skin:     General: Skin is warm and dry.      Capillary Refill: Capillary refill takes less than 2 seconds.   Neurological:      General: No focal deficit present.      Mental Status: She is alert and oriented to person, " place, and time.   Psychiatric:         Mood and Affect: Mood normal.         Behavior: Behavior normal.         Thought Content: Thought content normal.         Judgment: Judgment normal.         Office Visit on 12/06/2022   Component Date Value Ref Range Status    Hemoglobin A1C 12/06/2022 6.5  4.5 - 6.6 % Final      Normal:               <5.7%  Pre-Diabetic:       5.7% to 6.4%  Diabetic:             >6.4%  Diabetic Goal:     <7%    Estimated Average Glucose 12/06/2022 130  mg/dL Final    ProBNP 12/06/2022 95  1 - 125 pg/mL Final    WBC 12/06/2022 8.74  4.50 - 11.00 K/uL Final    RBC 12/06/2022 4.78  4.20 - 5.40 M/uL Final    Hemoglobin 12/06/2022 12.3  12.0 - 16.0 g/dL Final    Hematocrit 12/06/2022 35.9 (L)  38.0 - 47.0 % Final    MCV 12/06/2022 75.1 (L)  80.0 - 96.0 fL Final    MCH 12/06/2022 25.7 (L)  27.0 - 31.0 pg Final    MCHC 12/06/2022 34.3  32.0 - 36.0 g/dL Final    RDW 12/06/2022 15.4 (H)  11.5 - 14.5 % Final    Platelet Count 12/06/2022 227  150 - 400 K/uL Final    MPV 12/06/2022 11.9  9.4 - 12.4 fL Final    Neutrophils % 12/06/2022 43.8 (L)  53.0 - 65.0 % Final    Lymphocytes % 12/06/2022 42.2 (H)  27.0 - 41.0 % Final    Monocytes % 12/06/2022 7.1 (H)  2.0 - 6.0 % Final    Eosinophils % 12/06/2022 5.8 (H)  1.0 - 4.0 % Final    Basophils % 12/06/2022 0.6  0.0 - 1.0 % Final    Immature Granulocytes % 12/06/2022 0.5 (H)  0.0 - 0.4 % Final    nRBC, Auto 12/06/2022 0.0  <=0.0 % Final    Neutrophils, Abs 12/06/2022 3.83  1.80 - 7.70 K/uL Final    Lymphocytes, Absolute 12/06/2022 3.69  1.00 - 4.80 K/uL Final    Monocytes, Absolute 12/06/2022 0.62  0.00 - 0.80 K/uL Final    Eosinophils, Absolute 12/06/2022 0.51 (H)  0.00 - 0.50 K/uL Final    Basophils, Absolute 12/06/2022 0.05  0.00 - 0.20 K/uL Final    Immature Granulocytes, Absolute 12/06/2022 0.04  0.00 - 0.04 K/uL Final    nRBC, Absolute 12/06/2022 0.00  <=0.00 x10e3/uL Final    Diff Type 12/06/2022 Auto   Final   Office Visit on 12/01/2022   Component  Date Value Ref Range Status    Rapid Influenza A Ag 12/01/2022 Negative  Negative Final    Rapid Influenza B Ag 12/01/2022 Negative  Negative Final     Acceptable 12/01/2022 Yes   Final    SARS Coronavirus 2 Antigen 12/01/2022 Negative  Negative Final     Acceptable 12/01/2022 Yes   Final             Assessment and Plan (including Health Maintenance)         Plan:   Exacerbation of asthma, unspecified asthma severity, unspecified whether persistent    Upper respiratory tract infection, unspecified type   Continue nebs, steroid and abx as prev prescribed.   Follow up 12/27/2022 as prev scheduled for 3 mo follow up  There are no Patient Instructions on file for this visit.       Health Maintenance Due   Topic Date Due    Pneumococcal Vaccines (Age 0-64) (1 - PCV) Never done    TETANUS VACCINE  Never done    Shingles Vaccine (1 of 2) Never done    Colorectal Cancer Screening  Never done    COVID-19 Vaccine (4 - Booster for Pfizer series) 07/07/2022    Foot Exam  01/21/2023       Most Recent Immunizations   Administered Date(s) Administered    COVID-19, MRNA, LN-S, PF (Pfizer) (Purple Cap) 05/12/2022    Influenza - Quadrivalent - PF *Preferred* (6 months and older) 10/14/2022        Problem List Items Addressed This Visit    None  Visit Diagnoses       Exacerbation of asthma, unspecified asthma severity, unspecified whether persistent    -  Primary    Upper respiratory tract infection, unspecified type                Health Maintenance Topics with due status: Not Due       Topic Last Completion Date    Mammogram 03/26/2021    Lipid Panel 01/21/2022    Eye Exam 03/23/2022    Diabetes Urine Screening 09/26/2022    Low Dose Statin 10/24/2022    Hemoglobin A1c 12/06/2022       Future Appointments   Date Time Provider Department Center   12/27/2022 10:00 AM ROGELIO Pacheco [facility]   12/29/2022  8:45 AM [facility]            Signature:  Adri FOFANA  ROGELIO Kelly  [facility]     Date of encounter: 12/9/22

## 2022-12-28 DIAGNOSIS — M25.532 LEFT WRIST PAIN: Primary | ICD-10-CM

## 2023-07-10 ENCOUNTER — PATIENT MESSAGE (OUTPATIENT)
Dept: ADMINISTRATIVE | Facility: HOSPITAL | Age: 58
End: 2023-07-10

## 2023-07-10 ENCOUNTER — PATIENT OUTREACH (OUTPATIENT)
Dept: ADMINISTRATIVE | Facility: HOSPITAL | Age: 58
End: 2023-07-10

## 2023-07-10 NOTE — PROGRESS NOTES
No documentation found in [facility], [employer], or Care Everywhere for c-scope. NO upcoming appt scheduled at this time. Message sent to pt on portal about scheduling this. Comment placed in chart that pt needs this.

## 2023-11-15 ENCOUNTER — OFFICE VISIT (OUTPATIENT)
Dept: FAMILY MEDICINE | Facility: CLINIC | Age: 58
End: 2023-11-15
Payer: MEDICAID

## 2023-11-15 VITALS
WEIGHT: 190 LBS | TEMPERATURE: 98 F | SYSTOLIC BLOOD PRESSURE: 126 MMHG | BODY MASS INDEX: 34.96 KG/M2 | HEIGHT: 62 IN | DIASTOLIC BLOOD PRESSURE: 75 MMHG | HEART RATE: 72 BPM | RESPIRATION RATE: 20 BRPM | OXYGEN SATURATION: 98 %

## 2023-11-15 DIAGNOSIS — E78.5 HYPERLIPIDEMIA, UNSPECIFIED HYPERLIPIDEMIA TYPE: ICD-10-CM

## 2023-11-15 DIAGNOSIS — M19.90 INFLAMMATORY ARTHRITIS: ICD-10-CM

## 2023-11-15 DIAGNOSIS — J30.9 ALLERGIC RHINITIS, UNSPECIFIED SEASONALITY, UNSPECIFIED TRIGGER: ICD-10-CM

## 2023-11-15 DIAGNOSIS — E11.40 TYPE 2 DIABETES MELLITUS WITH DIABETIC NEUROPATHY, UNSPECIFIED WHETHER LONG TERM INSULIN USE: ICD-10-CM

## 2023-11-15 DIAGNOSIS — Z12.11 SCREENING FOR COLON CANCER: ICD-10-CM

## 2023-11-15 DIAGNOSIS — I10 HYPERTENSION, UNSPECIFIED TYPE: ICD-10-CM

## 2023-11-15 DIAGNOSIS — I47.10 SVT (SUPRAVENTRICULAR TACHYCARDIA): ICD-10-CM

## 2023-11-15 DIAGNOSIS — Z12.39 ENCOUNTER FOR SCREENING FOR MALIGNANT NEOPLASM OF BREAST, UNSPECIFIED SCREENING MODALITY: ICD-10-CM

## 2023-11-15 DIAGNOSIS — M06.9 RHEUMATOID ARTHRITIS INVOLVING MULTIPLE SITES, UNSPECIFIED WHETHER RHEUMATOID FACTOR PRESENT: Primary | ICD-10-CM

## 2023-11-15 LAB
ALBUMIN SERPL BCP-MCNC: 3.5 G/DL (ref 3.5–5)
ALBUMIN/GLOB SERPL: 0.9 {RATIO}
ALP SERPL-CCNC: 118 U/L (ref 46–118)
ALT SERPL W P-5'-P-CCNC: 32 U/L (ref 13–56)
ANION GAP SERPL CALCULATED.3IONS-SCNC: 8 MMOL/L (ref 7–16)
AST SERPL W P-5'-P-CCNC: 20 U/L (ref 15–37)
BILIRUB SERPL-MCNC: 0.4 MG/DL (ref ?–1.2)
BUN SERPL-MCNC: 12 MG/DL (ref 7–18)
BUN/CREAT SERPL: 11 (ref 6–20)
CALCIUM SERPL-MCNC: 9.1 MG/DL (ref 8.5–10.1)
CHLORIDE SERPL-SCNC: 108 MMOL/L (ref 98–107)
CO2 SERPL-SCNC: 31 MMOL/L (ref 21–32)
CREAT SERPL-MCNC: 1.06 MG/DL (ref 0.55–1.02)
CRP SERPL-MCNC: 1.3 MG/DL (ref 0–0.8)
EGFR (NO RACE VARIABLE) (RUSH/TITUS): 61 ML/MIN/1.73M2
ERYTHROCYTE [SEDIMENTATION RATE] IN BLOOD BY WESTERGREN METHOD: 28 MM/HR (ref 0–30)
EST. AVERAGE GLUCOSE BLD GHB EST-MCNC: 131 MG/DL
GLOBULIN SER-MCNC: 3.9 G/DL (ref 2–4)
GLUCOSE SERPL-MCNC: 120 MG/DL (ref 74–106)
HBA1C MFR BLD HPLC: 6.2 % (ref 4.5–6.6)
POTASSIUM SERPL-SCNC: 3.6 MMOL/L (ref 3.5–5.1)
PROT SERPL-MCNC: 7.4 G/DL (ref 6.4–8.2)
SODIUM SERPL-SCNC: 143 MMOL/L (ref 136–145)

## 2023-11-15 PROCEDURE — 3078F PR MOST RECENT DIASTOLIC BLOOD PRESSURE < 80 MM HG: ICD-10-PCS | Mod: CPTII,,, | Performed by: NURSE PRACTITIONER

## 2023-11-15 PROCEDURE — 3078F DIAST BP <80 MM HG: CPT | Mod: CPTII,,, | Performed by: NURSE PRACTITIONER

## 2023-11-15 PROCEDURE — 83036 HEMOGLOBIN GLYCOSYLATED A1C: CPT | Mod: ,,, | Performed by: CLINICAL MEDICAL LABORATORY

## 2023-11-15 PROCEDURE — 99214 OFFICE O/P EST MOD 30 MIN: CPT | Mod: ,,, | Performed by: NURSE PRACTITIONER

## 2023-11-15 PROCEDURE — 3074F SYST BP LT 130 MM HG: CPT | Mod: CPTII,,, | Performed by: NURSE PRACTITIONER

## 2023-11-15 PROCEDURE — 3008F PR BODY MASS INDEX (BMI) DOCUMENTED: ICD-10-PCS | Mod: CPTII,,, | Performed by: NURSE PRACTITIONER

## 2023-11-15 PROCEDURE — 86140 C-REACTIVE PROTEIN: CPT | Mod: ,,, | Performed by: CLINICAL MEDICAL LABORATORY

## 2023-11-15 PROCEDURE — 4010F ACE/ARB THERAPY RXD/TAKEN: CPT | Mod: CPTII,,, | Performed by: NURSE PRACTITIONER

## 2023-11-15 PROCEDURE — 3074F PR MOST RECENT SYSTOLIC BLOOD PRESSURE < 130 MM HG: ICD-10-PCS | Mod: CPTII,,, | Performed by: NURSE PRACTITIONER

## 2023-11-15 PROCEDURE — 3044F PR MOST RECENT HEMOGLOBIN A1C LEVEL <7.0%: ICD-10-PCS | Mod: CPTII,,, | Performed by: NURSE PRACTITIONER

## 2023-11-15 PROCEDURE — 3044F HG A1C LEVEL LT 7.0%: CPT | Mod: CPTII,,, | Performed by: NURSE PRACTITIONER

## 2023-11-15 PROCEDURE — 85651 RBC SED RATE NONAUTOMATED: CPT | Mod: ,,, | Performed by: CLINICAL MEDICAL LABORATORY

## 2023-11-15 PROCEDURE — 99214 PR OFFICE/OUTPT VISIT, EST, LEVL IV, 30-39 MIN: ICD-10-PCS | Mod: ,,, | Performed by: NURSE PRACTITIONER

## 2023-11-15 PROCEDURE — 1160F PR REVIEW ALL MEDS BY PRESCRIBER/CLIN PHARMACIST DOCUMENTED: ICD-10-PCS | Mod: CPTII,,, | Performed by: NURSE PRACTITIONER

## 2023-11-15 PROCEDURE — 83036 HEMOGLOBIN A1C: ICD-10-PCS | Mod: ,,, | Performed by: CLINICAL MEDICAL LABORATORY

## 2023-11-15 PROCEDURE — 4010F PR ACE/ARB THEARPY RXD/TAKEN: ICD-10-PCS | Mod: CPTII,,, | Performed by: NURSE PRACTITIONER

## 2023-11-15 PROCEDURE — 85651 SEDIMENTATION RATE, AUTOMATED: ICD-10-PCS | Mod: ,,, | Performed by: CLINICAL MEDICAL LABORATORY

## 2023-11-15 PROCEDURE — 80053 COMPREHEN METABOLIC PANEL: CPT | Mod: ,,, | Performed by: CLINICAL MEDICAL LABORATORY

## 2023-11-15 PROCEDURE — 80053 COMPREHENSIVE METABOLIC PANEL: ICD-10-PCS | Mod: ,,, | Performed by: CLINICAL MEDICAL LABORATORY

## 2023-11-15 PROCEDURE — 86140 C-REACTIVE PROTEIN: ICD-10-PCS | Mod: ,,, | Performed by: CLINICAL MEDICAL LABORATORY

## 2023-11-15 PROCEDURE — 1160F RVW MEDS BY RX/DR IN RCRD: CPT | Mod: CPTII,,, | Performed by: NURSE PRACTITIONER

## 2023-11-15 PROCEDURE — 3008F BODY MASS INDEX DOCD: CPT | Mod: CPTII,,, | Performed by: NURSE PRACTITIONER

## 2023-11-15 PROCEDURE — 1159F MED LIST DOCD IN RCRD: CPT | Mod: CPTII,,, | Performed by: NURSE PRACTITIONER

## 2023-11-15 PROCEDURE — 1159F PR MEDICATION LIST DOCUMENTED IN MEDICAL RECORD: ICD-10-PCS | Mod: CPTII,,, | Performed by: NURSE PRACTITIONER

## 2023-11-15 RX ORDER — CETIRIZINE HYDROCHLORIDE 10 MG/1
TABLET ORAL
Qty: 30 TABLET | Refills: 6 | Status: CANCELLED | OUTPATIENT
Start: 2023-11-15

## 2023-11-15 RX ORDER — ALBUTEROL SULFATE 0.83 MG/ML
2.5 SOLUTION RESPIRATORY (INHALATION) EVERY 4 HOURS PRN
Qty: 60 ML | Refills: 1 | Status: CANCELLED | OUTPATIENT
Start: 2023-11-15

## 2023-11-15 RX ORDER — LOSARTAN POTASSIUM 100 MG/1
100 TABLET ORAL DAILY
Qty: 30 TABLET | Refills: 11 | Status: SHIPPED | OUTPATIENT
Start: 2023-11-15 | End: 2024-11-09

## 2023-11-15 RX ORDER — FLUTICASONE PROPIONATE 50 MCG
1 SPRAY, SUSPENSION (ML) NASAL
COMMUNITY

## 2023-11-15 RX ORDER — MELOXICAM 15 MG/1
15 TABLET ORAL DAILY
COMMUNITY
Start: 2023-07-02

## 2023-11-15 RX ORDER — HUMAN INSULIN 100 [IU]/ML
INJECTION, SUSPENSION SUBCUTANEOUS
Qty: 12 ML | Refills: 3 | Status: SHIPPED | OUTPATIENT
Start: 2023-11-15 | End: 2024-04-03 | Stop reason: SDUPTHER

## 2023-11-15 RX ORDER — CARVEDILOL 12.5 MG/1
12.5 TABLET ORAL 2 TIMES DAILY
COMMUNITY
Start: 2023-10-04 | End: 2023-11-15 | Stop reason: SDUPTHER

## 2023-11-15 RX ORDER — CARVEDILOL 12.5 MG/1
12.5 TABLET ORAL 2 TIMES DAILY
Qty: 60 TABLET | Refills: 11 | Status: SHIPPED | OUTPATIENT
Start: 2023-11-15 | End: 2024-11-09

## 2023-11-15 RX ORDER — ALBUTEROL SULFATE 90 UG/1
2 AEROSOL, METERED RESPIRATORY (INHALATION) 4 TIMES DAILY PRN
Qty: 18 G | Refills: 1 | Status: CANCELLED | OUTPATIENT
Start: 2023-11-15

## 2023-11-15 RX ORDER — SIMVASTATIN 10 MG/1
10 TABLET, FILM COATED ORAL NIGHTLY
Qty: 90 TABLET | Refills: 3 | Status: SHIPPED | OUTPATIENT
Start: 2023-11-15 | End: 2024-11-14

## 2023-11-15 RX ORDER — CHLORTHALIDONE 25 MG/1
25 TABLET ORAL DAILY
Qty: 30 TABLET | Refills: 11 | Status: SHIPPED | OUTPATIENT
Start: 2023-11-15 | End: 2024-02-20

## 2023-11-21 NOTE — PROGRESS NOTES
ORGELIO Pacheco        PATIENT NAME: Jana Lopez  : 1965  DATE: 11/15/23  MRN: [id number]      Patient PCP Information       Provider PCP Type    Adri FOFANA ROGELIO [facility]            Reason for Visit / Chief Complaint: Medication Refill (Patient presents to the clinic for med refills pain in knee and right wrist/Rm2)         History of Present Illness / Problem Focused Workflow     Jana Lopez presents to the clinic with Medication Refill (Patient presents to the clinic for med refills pain in knee and right wrist/Rm2)     Medication Refill  Associated symptoms include arthralgias. Pertinent negatives include no abdominal pain, chest pain, chills, congestion, coughing, diaphoresis, fatigue, fever, headaches, joint swelling, myalgias, nausea, numbness, rash, sore throat or weakness.     Patient presents to clinic for medication refills. Patient with prior hx of HTN, DVT, RA, diabetes  Patient awaiting disability approval. States issue with insurance. Unclear, noted to have Medicaid in Epic.     Review of Systems     Review of Systems   Constitutional:  Negative for activity change, appetite change, chills, diaphoresis, fatigue, fever and unexpected weight change.   HENT:  Negative for congestion, ear pain, facial swelling, hearing loss, nosebleeds and sore throat.    Eyes: Negative.    Respiratory:  Negative for apnea, cough, shortness of breath and wheezing.    Cardiovascular:  Negative for chest pain, palpitations and leg swelling.   Gastrointestinal:  Negative for abdominal distention, abdominal pain, blood in stool, constipation, diarrhea and nausea.   Endocrine: Negative for cold intolerance, heat intolerance, polydipsia, polyphagia and polyuria.   Genitourinary:  Negative for decreased urine volume, difficulty urinating, dysuria, flank pain, frequency, hematuria and urgency.   Musculoskeletal:  Positive for arthralgias. Negative for joint swelling and myalgias.   Skin:  Negative for  color change and rash.   Allergic/Immunologic: Negative.    Neurological:  Negative for dizziness, tremors, seizures, syncope, facial asymmetry, speech difficulty, weakness, light-headedness, numbness and headaches.   Hematological:  Negative for adenopathy. Does not bruise/bleed easily.   Psychiatric/Behavioral:  Positive for dysphoric mood. Negative for behavioral problems and confusion.        Medical / Social / Family History     Past Medical History:   Diagnosis Date    Asthma     Dyslipidemia     Hypertension     HIRA (obstructive sleep apnea)     Rheumatoid arthritis     SVT (supraventricular tachycardia)     followed at [facility],        Past Surgical History:   Procedure Laterality Date    CHOLECYSTECTOMY      HYSTERECTOMY      LITHOTRIPSY         Social History  Ms.  reports that she has never smoked. She has never used smokeless tobacco. She reports that she does not currently use alcohol. She reports that she does not use drugs.    Family History  Ms.'s family history includes Diabetes in an other family member; Heart disease in her father and mother; Hypertension in her daughter and father; Stroke in an other family member.    Medications and Allergies     Medications  Outpatient Medications Marked as Taking for the 11/15/23 encounter (Office Visit) with Adri Kelly FNP   Medication Sig Dispense Refill    albuterol (PROVENTIL) 2.5 mg /3 mL (0.083 %) nebulizer solution Take 3 mLs (2.5 mg total) by nebulization every 4 (four) hours as needed for Wheezing. 60 mL 1    albuterol (PROVENTIL/VENTOLIN HFA) 90 mcg/actuation inhaler Inhale 2 puffs into the lungs 4 (four) times daily as needed for Wheezing. 18 g 1    aspirin (ECOTRIN) 81 MG EC tablet Take 81 mg by mouth once daily.      cetirizine (ZYRTEC) 10 MG tablet TAKE 1 TABLET BY MOUTH EVERY DAY AS NEEDED FOR ALLERGIES 30 tablet 6    cycloSPORINE (RESTASIS) 0.05 % ophthalmic emulsion Place 1 drop into both eyes as needed (dry eye).      diclofenac  "sodium (VOLTAREN) 1 % Gel APPLY 2 GRAMS TOPICALLY THREE TIMES DAILY 100 g 1    fluticasone propionate (FLONASE) 50 mcg/actuation nasal spray 1 spray by Each Nostril route as needed for Allergies.      folic acid (FOLVITE) 1 MG tablet Take 1 mg by mouth once daily.      gabapentin (NEURONTIN) 100 MG capsule Take 1 capsule (100 mg total) by mouth 3 (three) times daily. 90 capsule 11    magnesium 250 mg Tab Take by mouth.      meloxicam (MOBIC) 15 MG tablet Take 15 mg by mouth once daily.      methotrexate 2.5 MG Tab Take 2.5 mg by mouth every 7 days.      mometasone (NASONEX) 50 mcg/actuation nasal spray 2 sprays by Nasal route once daily. 17 g 2    omeprazole (PRILOSEC) 20 MG capsule Take 20 mg by mouth every morning.      potassium &magnesium aspartate (MAG ASPART-POTASSIUM ASPART ORAL) Take 1 capsule by mouth once daily.      [DISCONTINUED] carvediloL (COREG) 12.5 MG tablet Take 12.5 mg by mouth 2 (two) times daily.      [DISCONTINUED] chlorthalidone (HYGROTEN) 25 MG Tab Take 1 tablet by mouth once daily.      [DISCONTINUED] losartan (COZAAR) 100 MG tablet Take 100 mg by mouth once daily.      [DISCONTINUED] NOVOLIN 70-30 FLEXPEN U-100 100 unit/mL (70-30) InPn pen INJECT 27 UNITS UNDER THE SKIN EVERY MORNING AND 20 UNITS EVERY EVENING 12 mL 3    [DISCONTINUED] simvastatin (ZOCOR) 10 MG tablet Take 1 tablet (10 mg total) by mouth every evening. 90 tablet 3    [DISCONTINUED] tiZANidine (ZANAFLEX) 4 MG tablet TAKE 1 TABLET BY MOUTH EVERY 6 8 HOURS AS NEEDED         Allergies  Review of patient's allergies indicates:   Allergen Reactions    Lisinopril Other (See Comments)     cough       Physical Examination     Vitals:    11/15/23 0851   BP: 126/75   BP Location: Right arm   Patient Position: Sitting   BP Method: Large (Automatic)   Pulse: 72   Resp: 20   Temp: 98 °F (36.7 °C)   TempSrc: Oral   SpO2: 98%   Weight: 86.2 kg (190 lb)   Height: 5' 2" (1.575 m)   Over the last two weeks how often have you been bothered by " little interest or pleasure in doing things: 3  Over the last two weeks how often have you been bothered by feeling down, depressed or hopeless: 3  PHQ-2 Total Score: 6  PHQ-9 Score: 21  PHQ-9 Interpretation: Severe  Chronic depression. Prev on zoloft in past. Stopped taking.   Noncompliant with med      Physical Exam  Vitals reviewed.   Constitutional:       Appearance: Normal appearance.   HENT:      Head: Normocephalic.      Right Ear: External ear normal.      Left Ear: External ear normal.      Nose: Nose normal.      Mouth/Throat:      Mouth: Mucous membranes are moist.   Eyes:      Extraocular Movements: Extraocular movements intact.   Cardiovascular:      Rate and Rhythm: Normal rate and regular rhythm.      Pulses: Normal pulses.      Heart sounds: Normal heart sounds.   Pulmonary:      Effort: Pulmonary effort is normal.      Breath sounds: Normal breath sounds.   Abdominal:      Palpations: Abdomen is soft.   Musculoskeletal:         General: Normal range of motion.      Cervical back: Normal range of motion.   Skin:     General: Skin is warm and dry.      Capillary Refill: Capillary refill takes less than 2 seconds.   Neurological:      General: No focal deficit present.      Mental Status: She is alert and oriented to person, place, and time.   Psychiatric:         Mood and Affect: Mood normal.         Behavior: Behavior normal.         Thought Content: Thought content normal.         Judgment: Judgment normal.         Protective Sensation (w/ 10 gram monofilament):  Right: Intact  Left: Intact    Visual Inspection:  Normal -  Bilateral and Nails Intact - without Evidence of Foot Deformity- Bilateral    Pedal Pulses:   Right: Present  Left: Present    Posterior Tibialis Pulses:   Right:Present  Left: Present    Office Visit on 11/15/2023   Component Date Value Ref Range Status    ESR Westergren 11/15/2023 28  0 - 30 mm/Hr Final    CRP 11/15/2023 1.30 (H)  0.00 - 0.80 mg/dL Final    Hemoglobin A1C  11/15/2023 6.2  4.5 - 6.6 % Final      Normal:               <5.7%  Pre-Diabetic:       5.7% to 6.4%  Diabetic:             >6.4%  Diabetic Goal:     <7%    Estimated Average Glucose 11/15/2023 131  mg/dL Final    Sodium 11/15/2023 143  136 - 145 mmol/L Final    Potassium 11/15/2023 3.6  3.5 - 5.1 mmol/L Final    Chloride 11/15/2023 108 (H)  98 - 107 mmol/L Final    CO2 11/15/2023 31  21 - 32 mmol/L Final    Anion Gap 11/15/2023 8  7 - 16 mmol/L Final    Glucose 11/15/2023 120 (H)  74 - 106 mg/dL Final    BUN 11/15/2023 12  7 - 18 mg/dL Final    Creatinine 11/15/2023 1.06 (H)  0.55 - 1.02 mg/dL Final    BUN/Creatinine Ratio 11/15/2023 11  6 - 20 Final    Calcium 11/15/2023 9.1  8.5 - 10.1 mg/dL Final    Total Protein 11/15/2023 7.4  6.4 - 8.2 g/dL Final    Albumin 11/15/2023 3.5  3.5 - 5.0 g/dL Final    Globulin 11/15/2023 3.9  2.0 - 4.0 g/dL Final    A/G Ratio 11/15/2023 0.9   Final    Bilirubin, Total 11/15/2023 0.4  >0.0 - 1.2 mg/dL Final    Alk Phos 11/15/2023 118  46 - 118 U/L Final    ALT 11/15/2023 32  13 - 56 U/L Final    AST 11/15/2023 20  15 - 37 U/L Final    eGFR 11/15/2023 61  >=60 mL/min/1.73m2 Final             Assessment and Plan (including Health Maintenance)      Problem List  Smart Sets  Document Outside HM   :    Plan:   Rheumatoid arthritis involving multiple sites, unspecified whether rheumatoid factor present  Comments:  awaiting appt with new provider at [facility].    Hypertension, unspecified type  -     carvediloL (COREG) 12.5 MG tablet; Take 1 tablet (12.5 mg total) by mouth 2 (two) times daily.  Dispense: 60 tablet; Refill: 11  -     losartan (COZAAR) 100 MG tablet; Take 1 tablet (100 mg total) by mouth once daily.  Dispense: 30 tablet; Refill: 11  -     chlorthalidone (HYGROTEN) 25 MG Tab; Take 1 tablet (25 mg total) by mouth once daily.  Dispense: 30 tablet; Refill: 11    SVT (supraventricular tachycardia)  -     carvediloL (COREG) 12.5 MG tablet; Take 1 tablet (12.5 mg total) by mouth 2  (two) times daily.  Dispense: 60 tablet; Refill: 11    Type 2 diabetes mellitus with diabetic neuropathy, unspecified whether long term insulin use  -     NOVOLIN 70-30 FLEXPEN U-100 100 unit/mL (70-30) InPn pen; INJECT 27 UNITS UNDER THE SKIN EVERY MORNING AND 20 UNITS EVERY EVENING  Dispense: 12 mL; Refill: 3  -     blood sugar diagnostic Strp; 1 strip by Misc.(Non-Drug; Combo Route) route once daily.  Dispense: 100 each; Refill: 1  -     Hemoglobin A1C; Future; Expected date: 11/15/2023  -     Comprehensive Metabolic Panel; Future; Expected date: 11/15/2023  -      Eye exam needed    Hyperlipidemia, unspecified hyperlipidemia type  -     simvastatin (ZOCOR) 10 MG tablet; Take 1 tablet (10 mg total) by mouth every evening.  Dispense: 90 tablet; Refill: 3    Allergic rhinitis, unspecified seasonality, unspecified trigger             -  flonase    Inflammatory arthritis  -     Sedimentation Rate  -     C-Reactive Protein    Encounter for screening for malignant neoplasm of breast, unspecified screening modality  -     Mammo Digital Screening Bilat w/ Piter; Future; Expected date: 11/21/2023    Screening for colon cancer  -     Colonoscopy; Future; Expected date: 11/21/2023     RTC in 1 mo  There are no Patient Instructions on file for this visit.       Health Maintenance Due   Topic Date Due    Pneumococcal Vaccines (Age 0-64) (1 - PCV) Never done    TETANUS VACCINE  Never done    Shingles Vaccine (1 of 2) Never done    Colorectal Cancer Screening  Never done    Eye Exam  03/23/2023    Mammogram  03/26/2023    Influenza Vaccine (1) 09/01/2023    COVID-19 Vaccine (4 - 2023-24 season) 09/01/2023    Diabetes Urine Screening  09/26/2023       Most Recent Immunizations   Administered Date(s) Administered    COVID-19, MRNA, LN-S, PF (Pfizer) (Purple Cap) 05/12/2022    Influenza - Quadrivalent - PF *Preferred* (6 months and older) 10/14/2022        Problem List Items Addressed This Visit          Orthopedic    Inflammatory  arthritis    Relevant Medications    meloxicam (MOBIC) 15 MG tablet     Other Visit Diagnoses       Rheumatoid arthritis involving multiple sites, unspecified whether rheumatoid factor present    -  Primary    awaiting appt with new provider at [facility].    Hypertension, unspecified type        Relevant Medications    carvediloL (COREG) 12.5 MG tablet    losartan (COZAAR) 100 MG tablet    chlorthalidone (HYGROTEN) 25 MG Tab    SVT (supraventricular tachycardia)        Relevant Medications    carvediloL (COREG) 12.5 MG tablet    Type 2 diabetes mellitus with diabetic neuropathy, unspecified whether long term insulin use        Relevant Medications    NOVOLIN 70-30 FLEXPEN U-100 100 unit/mL (70-30) InPn pen    blood sugar diagnostic Strp    Other Relevant Orders    Hemoglobin A1C (Completed)    Comprehensive Metabolic Panel (Completed)    Hyperlipidemia, unspecified hyperlipidemia type        Relevant Medications    simvastatin (ZOCOR) 10 MG tablet    Allergic rhinitis, unspecified seasonality, unspecified trigger        Relevant Medications    fluticasone propionate (FLONASE) 50 mcg/actuation nasal spray    Encounter for screening for malignant neoplasm of breast, unspecified screening modality        Relevant Orders    Mammo Digital Screening Bilat w/ Piter    Screening for colon cancer        Relevant Orders    Colonoscopy            Health Maintenance Topics with due status: Not Due       Topic Last Completion Date    Lipid Panel 02/09/2023    Low Dose Statin 11/15/2023    Foot Exam 11/15/2023    Hemoglobin A1c 11/15/2023       Future Appointments   Date Time Provider Department Center   12/18/2023  8:15 AM Adri Kelly FNP [facility] [place]   1/11/2024 11:20 AM [facility] [place]            Signature:  ROGELIO Pacheco  [facility]     Date of encounter: 11/15/23

## 2023-12-13 ENCOUNTER — TELEPHONE (OUTPATIENT)
Dept: FAMILY MEDICINE | Facility: CLINIC | Age: 58
End: 2023-12-13
Payer: MEDICAID

## 2023-12-13 NOTE — TELEPHONE ENCOUNTER
Patient is needing more needles for her novolin flexpen, would that be possible or would she need to buy otc? Thank you so much.

## 2023-12-14 RX ORDER — BLOOD SUGAR DIAGNOSTIC
1 STRIP MISCELLANEOUS 2 TIMES DAILY
Qty: 100 EACH | Refills: 1 | Status: SHIPPED | OUTPATIENT
Start: 2023-12-14

## 2023-12-18 ENCOUNTER — OFFICE VISIT (OUTPATIENT)
Dept: FAMILY MEDICINE | Facility: CLINIC | Age: 58
End: 2023-12-18
Payer: MEDICARE

## 2023-12-18 ENCOUNTER — PATIENT OUTREACH (OUTPATIENT)
Dept: ADMINISTRATIVE | Facility: HOSPITAL | Age: 58
End: 2023-12-18

## 2023-12-18 VITALS
OXYGEN SATURATION: 96 % | TEMPERATURE: 99 F | SYSTOLIC BLOOD PRESSURE: 133 MMHG | HEIGHT: 62 IN | HEART RATE: 76 BPM | RESPIRATION RATE: 20 BRPM | BODY MASS INDEX: 35.7 KG/M2 | WEIGHT: 194 LBS | DIASTOLIC BLOOD PRESSURE: 83 MMHG

## 2023-12-18 DIAGNOSIS — E78.5 HYPERLIPIDEMIA, UNSPECIFIED HYPERLIPIDEMIA TYPE: Chronic | ICD-10-CM

## 2023-12-18 DIAGNOSIS — E66.01 SEVERE OBESITY (BMI 35.0-39.9) WITH COMORBIDITY: ICD-10-CM

## 2023-12-18 DIAGNOSIS — D84.821 DRUG-INDUCED IMMUNODEFICIENCY: ICD-10-CM

## 2023-12-18 DIAGNOSIS — L60.0 INGROWN TOENAIL: ICD-10-CM

## 2023-12-18 DIAGNOSIS — Z79.899 DRUG-INDUCED IMMUNODEFICIENCY: ICD-10-CM

## 2023-12-18 DIAGNOSIS — Z79.4 TYPE 2 DIABETES MELLITUS WITHOUT COMPLICATION, WITH LONG-TERM CURRENT USE OF INSULIN: Chronic | ICD-10-CM

## 2023-12-18 DIAGNOSIS — M19.90 INFLAMMATORY ARTHRITIS: Chronic | ICD-10-CM

## 2023-12-18 DIAGNOSIS — R53.83 FATIGUE, UNSPECIFIED TYPE: ICD-10-CM

## 2023-12-18 DIAGNOSIS — I10 HYPERTENSION, UNSPECIFIED TYPE: Primary | Chronic | ICD-10-CM

## 2023-12-18 DIAGNOSIS — E11.9 TYPE 2 DIABETES MELLITUS WITHOUT COMPLICATION, WITH LONG-TERM CURRENT USE OF INSULIN: Chronic | ICD-10-CM

## 2023-12-18 DIAGNOSIS — Z79.899 LONG-TERM USE OF HIGH-RISK MEDICATION: ICD-10-CM

## 2023-12-18 DIAGNOSIS — R41.89 BRAIN FOG: ICD-10-CM

## 2023-12-18 DIAGNOSIS — I47.10 SVT (SUPRAVENTRICULAR TACHYCARDIA): Chronic | ICD-10-CM

## 2023-12-18 PROBLEM — Z87.442 PERSONAL HISTORY OF URINARY CALCULI: Status: ACTIVE | Noted: 2023-12-01

## 2023-12-18 PROBLEM — M31.6 OTHER GIANT CELL ARTERITIS: Status: ACTIVE | Noted: 2023-12-01

## 2023-12-18 PROBLEM — J45.909 UNSPECIFIED ASTHMA, UNCOMPLICATED: Status: ACTIVE | Noted: 2023-12-01

## 2023-12-18 PROBLEM — K21.9 GASTRO-ESOPHAGEAL REFLUX DISEASE WITHOUT ESOPHAGITIS: Status: ACTIVE | Noted: 2023-12-01

## 2023-12-18 PROBLEM — D50.9 IRON DEFICIENCY ANEMIA, UNSPECIFIED: Status: ACTIVE | Noted: 2023-12-01

## 2023-12-18 LAB
25(OH)D3 SERPL-MCNC: 18.7 NG/ML
CREAT UR-MCNC: 271 MG/DL (ref 28–219)
FOLATE SERPL-MCNC: >20 NG/ML (ref 3.1–17.5)
IRON SATN MFR SERPL: 14 % (ref 14–50)
IRON SERPL-MCNC: 46 ΜG/DL (ref 50–170)
MICROALBUMIN UR-MCNC: 0.9 MG/DL (ref 0–2.8)
MICROALBUMIN/CREAT RATIO PNL UR: 3.3 MG/G (ref 0–30)
TIBC SERPL-MCNC: 318 ΜG/DL (ref 250–450)
VIT B12 SERPL-MCNC: 490 PG/ML (ref 193–986)

## 2023-12-18 PROCEDURE — 82746 VITAMIN B12/FOLATE, SERUM PANEL: ICD-10-PCS | Mod: ,,, | Performed by: CLINICAL MEDICAL LABORATORY

## 2023-12-18 PROCEDURE — 82043 MICROALBUMIN / CREATININE RATIO URINE: ICD-10-PCS | Mod: ,,, | Performed by: CLINICAL MEDICAL LABORATORY

## 2023-12-18 PROCEDURE — 82607 VITAMIN B-12: CPT | Mod: ,,, | Performed by: CLINICAL MEDICAL LABORATORY

## 2023-12-18 PROCEDURE — 82746 ASSAY OF FOLIC ACID SERUM: CPT | Mod: ,,, | Performed by: CLINICAL MEDICAL LABORATORY

## 2023-12-18 PROCEDURE — 83540 ASSAY OF IRON: CPT | Mod: ,,, | Performed by: CLINICAL MEDICAL LABORATORY

## 2023-12-18 PROCEDURE — 82306 VITAMIN D 25 HYDROXY: CPT | Mod: ,,, | Performed by: CLINICAL MEDICAL LABORATORY

## 2023-12-18 PROCEDURE — G0008 FLU VACCINE (QUAD) GREATER THAN OR EQUAL TO 3YO PRESERVATIVE FREE IM: ICD-10-PCS | Mod: ,,, | Performed by: NURSE PRACTITIONER

## 2023-12-18 PROCEDURE — 90686 FLU VACCINE (QUAD) GREATER THAN OR EQUAL TO 3YO PRESERVATIVE FREE IM: ICD-10-PCS | Mod: ,,, | Performed by: NURSE PRACTITIONER

## 2023-12-18 PROCEDURE — 83550 IRON BINDING TEST: CPT | Mod: ,,, | Performed by: CLINICAL MEDICAL LABORATORY

## 2023-12-18 PROCEDURE — 82043 UR ALBUMIN QUANTITATIVE: CPT | Mod: ,,, | Performed by: CLINICAL MEDICAL LABORATORY

## 2023-12-18 PROCEDURE — 82306 VITAMIN D: ICD-10-PCS | Mod: ,,, | Performed by: CLINICAL MEDICAL LABORATORY

## 2023-12-18 PROCEDURE — 99214 PR OFFICE/OUTPT VISIT, EST, LEVL IV, 30-39 MIN: ICD-10-PCS | Mod: ,,, | Performed by: NURSE PRACTITIONER

## 2023-12-18 PROCEDURE — 99214 OFFICE O/P EST MOD 30 MIN: CPT | Mod: ,,, | Performed by: NURSE PRACTITIONER

## 2023-12-18 PROCEDURE — G0008 ADMIN INFLUENZA VIRUS VAC: HCPCS | Mod: ,,, | Performed by: NURSE PRACTITIONER

## 2023-12-18 PROCEDURE — 82570 ASSAY OF URINE CREATININE: CPT | Mod: ,,, | Performed by: CLINICAL MEDICAL LABORATORY

## 2023-12-18 PROCEDURE — 90686 IIV4 VACC NO PRSV 0.5 ML IM: CPT | Mod: ,,, | Performed by: NURSE PRACTITIONER

## 2023-12-18 PROCEDURE — 83550 IRON AND TIBC: ICD-10-PCS | Mod: ,,, | Performed by: CLINICAL MEDICAL LABORATORY

## 2023-12-18 PROCEDURE — 83540 IRON AND TIBC: ICD-10-PCS | Mod: ,,, | Performed by: CLINICAL MEDICAL LABORATORY

## 2023-12-18 PROCEDURE — 82607 VITAMIN B12/FOLATE, SERUM PANEL: ICD-10-PCS | Mod: ,,, | Performed by: CLINICAL MEDICAL LABORATORY

## 2023-12-18 PROCEDURE — 82570 MICROALBUMIN / CREATININE RATIO URINE: ICD-10-PCS | Mod: ,,, | Performed by: CLINICAL MEDICAL LABORATORY

## 2023-12-18 RX ORDER — FERROUS SULFATE 325(65) MG
1 TABLET ORAL DAILY
COMMUNITY
Start: 2023-12-01

## 2023-12-18 RX ORDER — TOCILIZUMAB 180 MG/ML
162 INJECTION, SOLUTION SUBCUTANEOUS WEEKLY
COMMUNITY
Start: 2023-12-06

## 2023-12-18 NOTE — Clinical Note
Patient states just had eye exam at [facility]. Valeria Escobar doc. Please request eye exam

## 2023-12-18 NOTE — PROGRESS NOTES
ROGELIO Pacheco        PATIENT NAME: Jana Lopez  : 1965  DATE: 23  MRN: [id number]      Patient PCP Information       Provider PCP Type    ROGELIO Pacheco General            Reason for Visit / Chief Complaint: Arthritis and Follow-up (Patients presents to the clinic for 1m f/u for rheumatiod arthritis/Rm1)     History of Present Illness / Problem Focused Workflow     Jana Lopez presents to the clinic with Arthritis and Follow-up (Patients presents to the clinic for 1m f/u for rheumatiod arthritis/Rm1)     HPI  Patient presents to clinic for follow up. Hx of RA HTN SVT    Review of Systems     Review of Systems   Constitutional:  Negative for activity change, appetite change, chills, diaphoresis, fatigue, fever and unexpected weight change.   HENT:  Negative for congestion, ear pain, facial swelling, hearing loss, nosebleeds and sore throat.    Eyes: Negative.    Respiratory:  Negative for apnea, cough, shortness of breath and wheezing.    Cardiovascular:  Negative for chest pain, palpitations and leg swelling.   Gastrointestinal:  Negative for abdominal distention, abdominal pain, blood in stool, constipation, diarrhea and nausea.   Endocrine: Negative for cold intolerance, heat intolerance, polydipsia, polyphagia and polyuria.   Genitourinary:  Negative for decreased urine volume, difficulty urinating, dysuria, flank pain, frequency, hematuria and urgency.   Musculoskeletal:  Positive for arthralgias. Negative for joint swelling and myalgias.   Skin:  Negative for color change and rash.   Allergic/Immunologic: Negative.    Neurological:  Negative for dizziness, tremors, seizures, syncope, facial asymmetry, speech difficulty, weakness, light-headedness, numbness and headaches.   Hematological:  Negative for adenopathy. Does not bruise/bleed easily.   Psychiatric/Behavioral:  Negative for behavioral problems and confusion.        Medical / Social / Family History     Past  Medical History:   Diagnosis Date    Asthma     Dyslipidemia     Hypertension     HIRA (obstructive sleep apnea)     Rheumatoid arthritis     SVT (supraventricular tachycardia)     followed at [facility],        Past Surgical History:   Procedure Laterality Date    CHOLECYSTECTOMY      HYSTERECTOMY      LITHOTRIPSY         Social History  Ms.  reports that she has never smoked. She has never used smokeless tobacco. She reports that she does not currently use alcohol. She reports that she does not use drugs.    Family History  Ms.'s family history includes Diabetes in an other family member; Heart disease in her father and mother; Hypertension in her daughter and father; Stroke in an other family member.    Medications and Allergies     Medications  Outpatient Medications Marked as Taking for the 12/18/23 encounter (Office Visit) with Adri Kelly FNP   Medication Sig Dispense Refill    albuterol (PROVENTIL) 2.5 mg /3 mL (0.083 %) nebulizer solution Take 3 mLs (2.5 mg total) by nebulization every 4 (four) hours as needed for Wheezing. 60 mL 1    albuterol (PROVENTIL/VENTOLIN HFA) 90 mcg/actuation inhaler Inhale 2 puffs into the lungs 4 (four) times daily as needed for Wheezing. 18 g 1    aspirin (ECOTRIN) 81 MG EC tablet Take 81 mg by mouth once daily.      blood sugar diagnostic Strp 1 strip by Misc.(Non-Drug; Combo Route) route once daily. 100 each 1    carvediloL (COREG) 12.5 MG tablet Take 1 tablet (12.5 mg total) by mouth 2 (two) times daily. 60 tablet 11    cetirizine (ZYRTEC) 10 MG tablet TAKE 1 TABLET BY MOUTH EVERY DAY AS NEEDED FOR ALLERGIES 30 tablet 6    chlorthalidone (HYGROTEN) 25 MG Tab Take 1 tablet (25 mg total) by mouth once daily. 30 tablet 11    cycloSPORINE (RESTASIS) 0.05 % ophthalmic emulsion Place 1 drop into both eyes as needed (dry eye).      diclofenac sodium (VOLTAREN) 1 % Gel APPLY 2 GRAMS TOPICALLY THREE TIMES DAILY 100 g 1    ferrous sulfate (IRON) 325 mg (65 mg iron) Tab  "tablet Take 1 tablet by mouth once daily.      fluticasone propionate (FLONASE) 50 mcg/actuation nasal spray 1 spray by Each Nostril route as needed for Allergies.      folic acid (FOLVITE) 1 MG tablet Take 1 mg by mouth once daily.      gabapentin (NEURONTIN) 100 MG capsule Take 1 capsule (100 mg total) by mouth 3 (three) times daily. 90 capsule 11    losartan (COZAAR) 100 MG tablet Take 1 tablet (100 mg total) by mouth once daily. 30 tablet 11    magnesium 250 mg Tab Take 250 mg by mouth once daily.      meloxicam (MOBIC) 15 MG tablet Take 15 mg by mouth once daily.      methotrexate 2.5 MG Tab Take 2.5 mg by mouth every 7 days.      mometasone (NASONEX) 50 mcg/actuation nasal spray 2 sprays by Nasal route once daily. 17 g 2    NOVOLIN 70-30 FLEXPEN U-100 100 unit/mL (70-30) InPn pen INJECT 27 UNITS UNDER THE SKIN EVERY MORNING AND 20 UNITS EVERY EVENING 12 mL 3    omeprazole (PRILOSEC) 20 MG capsule Take 20 mg by mouth every morning.      pen needle, diabetic 32 gauge x 1/4" Ndle 1 Needle by Misc.(Non-Drug; Combo Route) route 2 (two) times a day. 100 each 1    potassium &magnesium aspartate (MAG ASPART-POTASSIUM ASPART ORAL) Take 1 capsule by mouth once daily.      simvastatin (ZOCOR) 10 MG tablet Take 1 tablet (10 mg total) by mouth every evening. 90 tablet 3    tocilizumab (ACTEMRA) 162 mg/0.9 mL injection Inject 162 mg into the skin once a week.         Allergies  Review of patient's allergies indicates:   Allergen Reactions    Lisinopril Other (See Comments)     cough       Physical Examination     Vitals:    12/18/23 0840   BP: 133/83   BP Location: Right arm   Patient Position: Sitting   BP Method: Large (Automatic)   Pulse: 76   Resp: 20   Temp: 98.5 °F (36.9 °C)   TempSrc: Oral   SpO2: 96%   Weight: 88 kg (194 lb)   Height: 5' 2" (1.575 m)       Physical Exam  Vitals reviewed.   Constitutional:       Appearance: She is obese.   HENT:      Head: Normocephalic.      Right Ear: External ear normal.      Left " Ear: External ear normal.      Nose: Nose normal.      Mouth/Throat:      Mouth: Mucous membranes are moist.   Eyes:      Extraocular Movements: Extraocular movements intact.   Cardiovascular:      Rate and Rhythm: Normal rate and regular rhythm.      Pulses: Normal pulses.      Heart sounds: Normal heart sounds.   Pulmonary:      Effort: Pulmonary effort is normal.      Breath sounds: Normal breath sounds.   Musculoskeletal:         General: Normal range of motion.      Cervical back: Normal range of motion.   Skin:     General: Skin is warm and dry.      Capillary Refill: Capillary refill takes less than 2 seconds.   Neurological:      General: No focal deficit present.      Mental Status: She is alert and oriented to person, place, and time.   Psychiatric:         Mood and Affect: Mood normal.         Behavior: Behavior normal.         Thought Content: Thought content normal.         Judgment: Judgment normal.           No visits with results within 14 Day(s) from this visit.   Latest known visit with results is:   Office Visit on 11/15/2023   Component Date Value Ref Range Status    ESR Westergren 11/15/2023 28  0 - 30 mm/Hr Final    CRP 11/15/2023 1.30 (H)  0.00 - 0.80 mg/dL Final    Hemoglobin A1C 11/15/2023 6.2  4.5 - 6.6 % Final      Normal:               <5.7%  Pre-Diabetic:       5.7% to 6.4%  Diabetic:             >6.4%  Diabetic Goal:     <7%    Estimated Average Glucose 11/15/2023 131  mg/dL Final    Sodium 11/15/2023 143  136 - 145 mmol/L Final    Potassium 11/15/2023 3.6  3.5 - 5.1 mmol/L Final    Chloride 11/15/2023 108 (H)  98 - 107 mmol/L Final    CO2 11/15/2023 31  21 - 32 mmol/L Final    Anion Gap 11/15/2023 8  7 - 16 mmol/L Final    Glucose 11/15/2023 120 (H)  74 - 106 mg/dL Final    BUN 11/15/2023 12  7 - 18 mg/dL Final    Creatinine 11/15/2023 1.06 (H)  0.55 - 1.02 mg/dL Final    BUN/Creatinine Ratio 11/15/2023 11  6 - 20 Final    Calcium 11/15/2023 9.1  8.5 - 10.1 mg/dL Final    Total Protein  11/15/2023 7.4  6.4 - 8.2 g/dL Final    Albumin 11/15/2023 3.5  3.5 - 5.0 g/dL Final    Globulin 11/15/2023 3.9  2.0 - 4.0 g/dL Final    A/G Ratio 11/15/2023 0.9   Final    Bilirubin, Total 11/15/2023 0.4  >0.0 - 1.2 mg/dL Final    Alk Phos 11/15/2023 118  46 - 118 U/L Final    ALT 11/15/2023 32  13 - 56 U/L Final    AST 11/15/2023 20  15 - 37 U/L Final    eGFR 11/15/2023 61  >=60 mL/min/1.73m2 Final             Assessment and Plan (including Health Maintenance)      Problem List  Smart Sets  Document Outside HM   :    Plan:   Hypertension, unspecified type  Comments:  continue coreg, chlorthalidone, losartan    Hyperlipidemia, unspecified hyperlipidemia type  Comments:  continue statin    Inflammatory arthritis  Comments:  follow up with Rheum as vic, continue mobic, MTX, and actemra  Orders:  -     Iron and TIBC; Future; Expected date: 12/18/2023  -     Vitamin B12 & Folate; Future; Expected date: 12/18/2023  -     Vitamin D; Future; Expected date: 12/18/2023    Type 2 diabetes mellitus without complication, with long-term current use of insulin  Comments:  continue statin and 70/30 27 units in am, 20 units in pm  Orders:  -     Microalbumin/Creatinine Ratio, Urine; Future; Expected date: 12/18/2023  -     Iron and TIBC; Future; Expected date: 12/18/2023  -     Ambulatory referral/consult to Podiatry; Future; Expected date: 12/25/2023    SVT (supraventricular tachycardia)  Comments:  continue coreg as ordered    Brain fog  -     Vitamin B12 & Folate; Future; Expected date: 12/18/2023  -     Vitamin D; Future; Expected date: 12/18/2023    Long-term use of high-risk medication  -     Iron and TIBC; Future; Expected date: 12/18/2023  -     Vitamin B12 & Folate; Future; Expected date: 12/18/2023  -     Vitamin D; Future; Expected date: 12/18/2023    Fatigue, unspecified type  -     Iron and TIBC; Future; Expected date: 12/18/2023  -     Vitamin B12 & Folate; Future; Expected date: 12/18/2023  -     Vitamin D; Future;  Expected date: 12/18/2023    Ingrown toenail  -     Ambulatory referral/consult to Podiatry; Future; Expected date: 12/25/2023    Severe obesity (BMI 35.0-39.9) with comorbidity  Comments:  healthy diet, exercise and weight loss recommended    Drug-induced immunodeficiency  -     Influenza - Quadrivalent *Preferred* (6 months+) (PF)     RTC in 2 mo  There are no Patient Instructions on file for this visit.       Health Maintenance Due   Topic Date Due    Pneumococcal Vaccines (Age 0-64) (1 - PCV) Never done    TETANUS VACCINE  Never done    Shingles Vaccine (1 of 2) Never done    Colorectal Cancer Screening  Never done    Eye Exam  03/23/2023    Mammogram  03/26/2023    COVID-19 Vaccine (4 - 2023-24 season) 09/01/2023    Diabetes Urine Screening  09/26/2023       Most Recent Immunizations   Administered Date(s) Administered    COVID-19, MRNA, LN-S, PF (Pfizer) (Purple Cap) 05/12/2022    Influenza - Quadrivalent - PF *Preferred* (6 months and older) 12/18/2023        Problem List Items Addressed This Visit          Immunology/Multi System    Drug-induced immunodeficiency    Relevant Orders    Influenza - Quadrivalent *Preferred* (6 months+) (PF) (Completed)       Endocrine    Type 2 diabetes mellitus    Relevant Orders    Microalbumin/Creatinine Ratio, Urine    Iron and TIBC    Ambulatory referral/consult to Podiatry    Severe obesity (BMI 35.0-39.9) with comorbidity       Orthopedic    Inflammatory arthritis    Relevant Orders    Iron and TIBC    Vitamin B12 & Folate    Vitamin D     Other Visit Diagnoses       Hypertension, unspecified type  (Chronic)  (Well controlled)  -  Primary    continue coreg, chlorthalidone, losartan    Hyperlipidemia, unspecified hyperlipidemia type  (Chronic)       continue statin    SVT (supraventricular tachycardia)  (Chronic)       continue coreg as ordered    Brain fog        Relevant Orders    Vitamin B12 & Folate    Vitamin D    Long-term use of high-risk medication        Relevant  Orders    Iron and TIBC    Vitamin B12 & Folate    Vitamin D    Fatigue, unspecified type        Relevant Orders    Iron and TIBC    Vitamin B12 & Folate    Vitamin D    Ingrown toenail        Relevant Orders    Ambulatory referral/consult to Podiatry            Health Maintenance Topics with due status: Not Due       Topic Last Completion Date    Lipid Panel 02/09/2023    Foot Exam 11/15/2023    Hemoglobin A1c 11/15/2023    Low Dose Statin 12/18/2023       Future Appointments   Date Time Provider Department Center   1/11/2024 11:20 AM [facility]   2/19/2024  9:30 AM Adri Kelly FNP [facility]            Signature:  ROGELIO Pacheco  [facility]     Date of encounter: 12/18/23

## 2023-12-18 NOTE — LETTER
AUTHORIZATION FOR RELEASE OF   CONFIDENTIAL INFORMATION    Dear Luz Garg,    We are seeing Jana Lopez, date of birth 1965, in the clinic at [facility]. Adri Kelly FNP is the patient's PCP. Jana Lopez has an outstanding lab/procedure at the time we reviewed her chart. In order to help keep her health information updated, she has authorized us to request the following medical record(s):        (  )  MAMMOGRAM                                      (  )  COLONOSCOPY      (  )  PAP SMEAR                                          (  )  OUTSIDE LAB RESULTS     (  )  DEXA SCAN                                          ( x )  EYE EXAM            (  )  FOOT EXAM                                          (  )  ENTIRE RECORD     (  )  OUTSIDE IMMUNIZATIONS                 (  )  _______________         Please fax records to Adarsh Art LPN Care Coordinator at [phone].      If you have any questions, please contact Adarsh at [phone].           Patient Name: Jana Lopez  : 1965  Patient Phone #: [phone]

## 2023-12-18 NOTE — PROGRESS NOTES
Population Health Chart Review & Patient Outreach Details    Updates Requested / Reviewed:  [x]  Care Team Updated    Health Maintenance Topics Addressed and Outreach Outcomes / Actions Taken:          Diabetic Eye Exam [x] ERICK sent to [facility].

## 2023-12-19 ENCOUNTER — PATIENT MESSAGE (OUTPATIENT)
Dept: ADMINISTRATIVE | Facility: HOSPITAL | Age: 58
End: 2023-12-19

## 2023-12-19 RX ORDER — ERGOCALCIFEROL 1.25 MG/1
50000 CAPSULE ORAL
Qty: 12 CAPSULE | Refills: 0 | Status: SHIPPED | OUTPATIENT
Start: 2023-12-19 | End: 2024-02-20

## 2023-12-20 DIAGNOSIS — Z12.11 SCREENING FOR COLON CANCER: ICD-10-CM

## 2024-01-09 DIAGNOSIS — Z71.89 COMPLEX CARE COORDINATION: ICD-10-CM

## 2024-01-10 ENCOUNTER — DOCUMENT SCAN (OUTPATIENT)
Dept: HOME HEALTH SERVICES | Facility: HOSPITAL | Age: 59
End: 2024-01-10
Payer: MEDICARE

## 2024-01-11 ENCOUNTER — HOSPITAL ENCOUNTER (OUTPATIENT)
Dept: RADIOLOGY | Facility: HOSPITAL | Age: 59
Discharge: HOME | End: 2024-01-11
Attending: NURSE PRACTITIONER
Payer: MEDICARE

## 2024-01-11 VITALS — BODY MASS INDEX: 34.96 KG/M2 | WEIGHT: 190 LBS | HEIGHT: 62 IN

## 2024-01-11 DIAGNOSIS — Z12.39 ENCOUNTER FOR SCREENING FOR MALIGNANT NEOPLASM OF BREAST, UNSPECIFIED SCREENING MODALITY: ICD-10-CM

## 2024-01-11 PROCEDURE — 77067 SCR MAMMO BI INCL CAD: CPT | Mod: TC

## 2024-02-15 RX ORDER — CETIRIZINE HYDROCHLORIDE 10 MG/1
TABLET ORAL
Qty: 90 TABLET | Refills: 3 | Status: SHIPPED | OUTPATIENT
Start: 2024-02-15

## 2024-02-16 ENCOUNTER — DOCUMENT SCAN (OUTPATIENT)
Dept: HOME HEALTH SERVICES | Facility: HOSPITAL | Age: 59
End: 2024-02-16
Payer: MEDICARE

## 2024-02-20 ENCOUNTER — OFFICE VISIT (OUTPATIENT)
Dept: FAMILY MEDICINE | Facility: CLINIC | Age: 59
End: 2024-02-20
Payer: MEDICARE

## 2024-02-20 VITALS
TEMPERATURE: 98 F | DIASTOLIC BLOOD PRESSURE: 80 MMHG | RESPIRATION RATE: 20 BRPM | WEIGHT: 198 LBS | SYSTOLIC BLOOD PRESSURE: 130 MMHG | BODY MASS INDEX: 36.44 KG/M2 | OXYGEN SATURATION: 99 % | HEIGHT: 62 IN | HEART RATE: 64 BPM

## 2024-02-20 DIAGNOSIS — E78.5 HYPERLIPIDEMIA, UNSPECIFIED HYPERLIPIDEMIA TYPE: ICD-10-CM

## 2024-02-20 DIAGNOSIS — H91.90 HEARING LOSS, UNSPECIFIED HEARING LOSS TYPE, UNSPECIFIED LATERALITY: ICD-10-CM

## 2024-02-20 DIAGNOSIS — E11.69 TYPE 2 DIABETES MELLITUS WITH OTHER SPECIFIED COMPLICATION, WITH LONG-TERM CURRENT USE OF INSULIN: Chronic | ICD-10-CM

## 2024-02-20 DIAGNOSIS — M06.9 RHEUMATOID ARTHRITIS INVOLVING MULTIPLE SITES, UNSPECIFIED WHETHER RHEUMATOID FACTOR PRESENT: Primary | ICD-10-CM

## 2024-02-20 DIAGNOSIS — I47.10 SVT (SUPRAVENTRICULAR TACHYCARDIA): ICD-10-CM

## 2024-02-20 DIAGNOSIS — I10 HYPERTENSION, UNSPECIFIED TYPE: Chronic | ICD-10-CM

## 2024-02-20 DIAGNOSIS — Z79.4 TYPE 2 DIABETES MELLITUS WITH OTHER SPECIFIED COMPLICATION, WITH LONG-TERM CURRENT USE OF INSULIN: Chronic | ICD-10-CM

## 2024-02-20 DIAGNOSIS — Z76.0 MEDICATION REFILL: ICD-10-CM

## 2024-02-20 LAB
CHOLEST SERPL-MCNC: 190 MG/DL (ref 0–200)
CHOLEST/HDLC SERPL: 4.5 {RATIO}
HDLC SERPL-MCNC: 42 MG/DL (ref 40–60)
LDLC SERPL CALC-MCNC: 125 MG/DL
LDLC/HDLC SERPL: 3 {RATIO}
NONHDLC SERPL-MCNC: 148 MG/DL
TRIGL SERPL-MCNC: 115 MG/DL (ref 35–150)
VLDLC SERPL-MCNC: 23 MG/DL

## 2024-02-20 PROCEDURE — 1160F RVW MEDS BY RX/DR IN RCRD: CPT | Mod: ,,, | Performed by: NURSE PRACTITIONER

## 2024-02-20 PROCEDURE — 80061 LIPID PANEL: CPT | Mod: ,,, | Performed by: CLINICAL MEDICAL LABORATORY

## 2024-02-20 PROCEDURE — 4010F ACE/ARB THERAPY RXD/TAKEN: CPT | Mod: ,,, | Performed by: NURSE PRACTITIONER

## 2024-02-20 PROCEDURE — 1159F MED LIST DOCD IN RCRD: CPT | Mod: ,,, | Performed by: NURSE PRACTITIONER

## 2024-02-20 PROCEDURE — 99214 OFFICE O/P EST MOD 30 MIN: CPT | Mod: ,,, | Performed by: NURSE PRACTITIONER

## 2024-02-20 PROCEDURE — 3075F SYST BP GE 130 - 139MM HG: CPT | Mod: ,,, | Performed by: NURSE PRACTITIONER

## 2024-02-20 PROCEDURE — 3079F DIAST BP 80-89 MM HG: CPT | Mod: ,,, | Performed by: NURSE PRACTITIONER

## 2024-02-20 PROCEDURE — 3008F BODY MASS INDEX DOCD: CPT | Mod: ,,, | Performed by: NURSE PRACTITIONER

## 2024-02-20 RX ORDER — MAGNESIUM 250 MG
250 TABLET ORAL DAILY
Qty: 90 TABLET | Refills: 3 | Status: SHIPPED | OUTPATIENT
Start: 2024-02-20 | End: 2025-02-14

## 2024-02-20 RX ORDER — CHLORTHALIDONE 25 MG/1
25 TABLET ORAL DAILY
Qty: 30 TABLET | Refills: 11 | Status: SHIPPED | OUTPATIENT
Start: 2024-02-20 | End: 2024-04-03 | Stop reason: SDUPTHER

## 2024-02-20 RX ORDER — INSULIN PUMP SYRINGE, 3 ML
EACH MISCELLANEOUS
Qty: 1 EACH | Refills: 0 | Status: SHIPPED | OUTPATIENT
Start: 2024-02-20 | End: 2025-02-19

## 2024-02-20 RX ORDER — POTASSIUM CHLORIDE 600 MG/1
8 TABLET, FILM COATED, EXTENDED RELEASE ORAL DAILY
Qty: 90 TABLET | Refills: 3 | Status: SHIPPED | OUTPATIENT
Start: 2024-02-20 | End: 2025-02-14

## 2024-02-20 NOTE — PROGRESS NOTES
ROGELIO Pacheco        PATIENT NAME: Jana Lopez  : 1965  DATE: 24  MRN: [id number]      Patient PCP Information       Provider PCP Type    Adri FOFANA ROGELIO [facility]            Reason for Visit / Chief Complaint: Follow-up and Hypertension (Patient presents to the clinic for 2m /Eye exams scheduled by pt.)       History of Present Illness / Problem Focused Workflow     Jana Lopez presents to the clinic with Follow-up and Hypertension (Patient presents to the clinic for 2m /Eye exams scheduled by pt.)     Follow-up  Pertinent negatives include no abdominal pain, arthralgias, chest pain, chills, congestion, coughing, diaphoresis, fatigue, fever, headaches, joint swelling, myalgias, nausea, numbness, rash, sore throat or weakness.   Hypertension  Pertinent negatives include no chest pain, headaches, palpitations or shortness of breath.       Review of Systems     Review of Systems   Constitutional:  Negative for activity change, appetite change, chills, diaphoresis, fatigue, fever and unexpected weight change.   HENT:  Negative for congestion, ear pain, facial swelling, hearing loss, nosebleeds and sore throat.    Eyes: Negative.    Respiratory:  Negative for apnea, cough, shortness of breath and wheezing.    Cardiovascular:  Negative for chest pain, palpitations and leg swelling.   Gastrointestinal:  Negative for abdominal distention, abdominal pain, blood in stool, constipation, diarrhea and nausea.   Endocrine: Negative for cold intolerance, heat intolerance, polydipsia, polyphagia and polyuria.   Genitourinary:  Negative for decreased urine volume, difficulty urinating, dysuria, flank pain, frequency, hematuria and urgency.   Musculoskeletal:  Negative for arthralgias, joint swelling and myalgias.   Skin:  Negative for color change and rash.   Allergic/Immunologic: Negative.    Neurological:  Negative for dizziness, tremors, seizures, syncope, facial asymmetry, speech difficulty,  weakness, light-headedness, numbness and headaches.   Hematological:  Negative for adenopathy. Does not bruise/bleed easily.   Psychiatric/Behavioral:  Negative for behavioral problems and confusion.        Medical / Social / Family History     Past Medical History:   Diagnosis Date    Asthma     Dyslipidemia     Hypertension     HIRA (obstructive sleep apnea)     Rheumatoid arthritis     SVT (supraventricular tachycardia)     followed at [facility],     Type 2 diabetes mellitus 12/01/2023       Past Surgical History:   Procedure Laterality Date    CHOLECYSTECTOMY      HYSTERECTOMY      LITHOTRIPSY         Social History  Ms.  reports that she has never smoked. She has never used smokeless tobacco. She reports that she does not currently use alcohol. She reports that she does not use drugs.    Family History  Ms.'s family history includes Diabetes in an other family member; Heart disease in her father and mother; Hypertension in her daughter and father; Stroke in an other family member.    Medications and Allergies     Medications  Outpatient Medications Marked as Taking for the 2/20/24 encounter (Office Visit) with Adri Kelly FNP   Medication Sig Dispense Refill    albuterol (PROVENTIL) 2.5 mg /3 mL (0.083 %) nebulizer solution Take 3 mLs (2.5 mg total) by nebulization every 4 (four) hours as needed for Wheezing. 60 mL 1    albuterol (PROVENTIL/VENTOLIN HFA) 90 mcg/actuation inhaler Inhale 2 puffs into the lungs 4 (four) times daily as needed for Wheezing. 18 g 1    aspirin (ECOTRIN) 81 MG EC tablet Take 81 mg by mouth once daily.      blood sugar diagnostic Strp 1 strip by Misc.(Non-Drug; Combo Route) route once daily. 100 each 1    carvediloL (COREG) 12.5 MG tablet Take 1 tablet (12.5 mg total) by mouth 2 (two) times daily. 60 tablet 11    cetirizine (ZYRTEC) 10 MG tablet TAKE 1 TABLET BY MOUTH EVERY DAY AS NEEDED FOR ALLERGIES 90 tablet 3    cycloSPORINE (RESTASIS) 0.05 % ophthalmic emulsion Place 1  "drop into both eyes as needed (dry eye).      diclofenac sodium (VOLTAREN) 1 % Gel APPLY 2 GRAMS TOPICALLY THREE TIMES DAILY 100 g 1    ferrous sulfate (IRON) 325 mg (65 mg iron) Tab tablet Take 1 tablet by mouth once daily.      fluticasone propionate (FLONASE) 50 mcg/actuation nasal spray 1 spray by Each Nostril route as needed for Allergies.      folic acid (FOLVITE) 1 MG tablet Take 1 mg by mouth once daily.      gabapentin (NEURONTIN) 100 MG capsule Take 1 capsule (100 mg total) by mouth 3 (three) times daily. 90 capsule 11    losartan (COZAAR) 100 MG tablet Take 1 tablet (100 mg total) by mouth once daily. 30 tablet 11    meloxicam (MOBIC) 15 MG tablet Take 15 mg by mouth once daily.      methotrexate 2.5 MG Tab Take 2.5 mg by mouth every 7 days.      mometasone (NASONEX) 50 mcg/actuation nasal spray 2 sprays by Nasal route once daily. 17 g 2    NOVOLIN 70-30 FLEXPEN U-100 100 unit/mL (70-30) InPn pen INJECT 27 UNITS UNDER THE SKIN EVERY MORNING AND 20 UNITS EVERY EVENING 12 mL 3    omeprazole (PRILOSEC) 20 MG capsule Take 20 mg by mouth every morning.      pen needle, diabetic 32 gauge x 1/4" Ndle 1 Needle by Misc.(Non-Drug; Combo Route) route 2 (two) times a day. 100 each 1    simvastatin (ZOCOR) 10 MG tablet Take 1 tablet (10 mg total) by mouth every evening. 90 tablet 3    tocilizumab (ACTEMRA) 162 mg/0.9 mL injection Inject 162 mg into the skin once a week.      [DISCONTINUED] magnesium 250 mg Tab Take 250 mg by mouth once daily.      [DISCONTINUED] potassium &magnesium aspartate (MAG ASPART-POTASSIUM ASPART ORAL) Take 1 capsule by mouth once daily.         Allergies  Review of patient's allergies indicates:   Allergen Reactions    Lisinopril Other (See Comments)     cough       Physical Examination     Vitals:    02/20/24 1027 02/20/24 1032   BP: (!) 140/86 130/80   BP Location: Right arm Right arm   Patient Position: Sitting Sitting   BP Method: Medium (Automatic) Large (Automatic)   Pulse: 64    Resp: " "20    Temp: 98.2 °F (36.8 °C)    TempSrc: Oral    SpO2: 99%    Weight: 89.8 kg (198 lb)    Height: 5' 2" (1.575 m)        Physical Exam  Vitals and nursing note reviewed.   Constitutional:       Appearance: Normal appearance. She is obese.   HENT:      Head: Normocephalic.      Right Ear: External ear normal.      Left Ear: External ear normal.      Nose: Nose normal.      Mouth/Throat:      Mouth: Mucous membranes are moist.   Eyes:      Extraocular Movements: Extraocular movements intact.      Conjunctiva/sclera: Conjunctivae normal.      Pupils: Pupils are equal, round, and reactive to light.   Cardiovascular:      Rate and Rhythm: Normal rate and regular rhythm.      Pulses: Normal pulses.      Heart sounds: Normal heart sounds. No murmur heard.  Pulmonary:      Effort: Pulmonary effort is normal.      Breath sounds: Normal breath sounds. No stridor. No wheezing or rhonchi.   Abdominal:      General: Bowel sounds are normal. There is no distension.      Palpations: Abdomen is soft. There is no mass.      Tenderness: There is no abdominal tenderness.   Musculoskeletal:         General: No swelling or tenderness. Normal range of motion.      Cervical back: Normal range of motion and neck supple.      Right lower leg: No edema.      Left lower leg: No edema.   Skin:     General: Skin is warm and dry.      Capillary Refill: Capillary refill takes less than 2 seconds.   Neurological:      General: No focal deficit present.      Mental Status: She is alert and oriented to person, place, and time. Mental status is at baseline.      Cranial Nerves: No cranial nerve deficit.      Sensory: No sensory deficit.      Motor: No weakness.   Psychiatric:         Mood and Affect: Mood normal.         Behavior: Behavior normal.         Thought Content: Thought content normal.         Judgment: Judgment normal.           Office Visit on 02/20/2024   Component Date Value Ref Range Status    Triglycerides 02/20/2024 115  35 - 150 " mg/dL Final      Normal:  <150 mg/dL  Borderline High: 150-199 mg/dL  High:   200-499 mg/dL  Very High:  >=500    Cholesterol 02/20/2024 190  0 - 200 mg/dL Final      <200 mg/dL:  Desirable  200-240 mg/dL: Borderline High  >240 mg/dL:  High    HDL Cholesterol 02/20/2024 42  40 - 60 mg/dL Final      <40 mg/dL: Low HDL  40-60 mg/dL: Normal  >60 mg/dL: Desirable    Cholesterol/HDL Ratio (Risk Factor) 02/20/2024 4.5   Final    Non-HDL 02/20/2024 148  mg/dL Final    LDL Calculated 02/20/2024 125  mg/dL Final    LDL/HDL 02/20/2024 3.0   Final    VLDL 02/20/2024 23  mg/dL Final             Assessment and Plan (including Health Maintenance)      Problem List  Smart Sets  Document Outside HM   :    Plan:   Rheumatoid arthritis involving multiple sites, unspecified whether rheumatoid factor present  Comments:  MTX and mobic,actamera    Hypertension, unspecified type  Comments:  continue coreg  Orders:  -     chlorthalidone (HYGROTEN) 25 MG Tab; Take 1 tablet (25 mg total) by mouth once daily.  Dispense: 30 tablet; Refill: 11    Hyperlipidemia, unspecified hyperlipidemia type  Comments:  continue statin  Orders:  -     Lipid Panel; Future; Expected date: 02/20/2024    Type 2 diabetes mellitus with other specified complication, with long-term current use of insulin  Comments:  continue 70/30 BID    Hearing loss, unspecified hearing loss type, unspecified laterality  -     Ambulatory referral/consult to Audiology; Future; Expected date: 02/27/2024    SVT (supraventricular tachycardia)  Comments:  continue coreg    Medication refill  -     magnesium 250 mg Tab; Take 1 tablet (250 mg total) by mouth once daily.  Dispense: 90 tablet; Refill: 3  -     potassium chloride (KLOR-CON) 8 MEQ TbSR; Take 1 tablet (8 mEq total) by mouth once daily.  Dispense: 90 tablet; Refill: 3  -     blood-glucose meter kit; Use as instructed  Dispense: 1 each; Refill: 0     RTC in 3 mo  There are no Patient Instructions on file for this visit.        Health Maintenance Due   Topic Date Due    Pneumococcal Vaccines (Age 0-64) (1 of 2 - PCV) Never done    TETANUS VACCINE  Never done    Shingles Vaccine (1 of 2) Never done    Colorectal Cancer Screening  Never done    Eye Exam  03/23/2023    COVID-19 Vaccine (4 - 2023-24 season) 09/01/2023       Most Recent Immunizations   Administered Date(s) Administered    COVID-19, MRNA, LN-S, PF (Pfizer) (Purple Cap) 05/12/2022    Influenza - Quadrivalent - PF *Preferred* (6 months and older) 12/18/2023        Problem List Items Addressed This Visit          Endocrine    Type 2 diabetes mellitus     Other Visit Diagnoses       Rheumatoid arthritis involving multiple sites, unspecified whether rheumatoid factor present    -  Primary    MTX and mobic,actamera    Hypertension, unspecified type  (Chronic)  (Well controlled)      continue coreg    Relevant Medications    chlorthalidone (HYGROTEN) 25 MG Tab    Hyperlipidemia, unspecified hyperlipidemia type        continue statin    Relevant Orders    Lipid Panel (Completed)    Hearing loss, unspecified hearing loss type, unspecified laterality        Relevant Orders    Ambulatory referral/consult to Audiology    SVT (supraventricular tachycardia)        continue coreg    Medication refill        Relevant Medications    magnesium 250 mg Tab    potassium chloride (KLOR-CON) 8 MEQ TbSR    blood-glucose meter kit            Health Maintenance Topics with due status: Not Due       Topic Last Completion Date    Foot Exam 11/15/2023    Hemoglobin A1c 11/15/2023    Diabetes Urine Screening 12/18/2023    Mammogram 01/11/2024    Low Dose Statin 02/20/2024    Lipid Panel 02/20/2024       Future Appointments   Date Time Provider Department Center   4/3/2024 10:00 AM AWMARIPOSA NURSE, [facility] KATHRIN Puentes   5/20/2024  9:30 AM Adri Kelly FNP [facility] K[facility]HRIN [place]   1/16/2025 11:40 AM [facility] M[facility]1 [facility] [place]            Signature:  Adri  ROGELIO Guevara  [facility]     Date of encounter: 2/20/24

## 2024-02-24 PROBLEM — N20.0 KIDNEY STONE: Status: RESOLVED | Noted: 2018-08-23 | Resolved: 2024-02-24

## 2024-03-17 ENCOUNTER — OFFICE VISIT (OUTPATIENT)
Dept: FAMILY MEDICINE | Facility: CLINIC | Age: 59
End: 2024-03-17
Payer: MEDICARE

## 2024-03-17 VITALS
DIASTOLIC BLOOD PRESSURE: 87 MMHG | WEIGHT: 203 LBS | HEART RATE: 74 BPM | RESPIRATION RATE: 22 BRPM | TEMPERATURE: 98 F | BODY MASS INDEX: 37.36 KG/M2 | SYSTOLIC BLOOD PRESSURE: 127 MMHG | OXYGEN SATURATION: 95 % | HEIGHT: 62 IN

## 2024-03-17 DIAGNOSIS — J02.0 STREP PHARYNGITIS: Primary | ICD-10-CM

## 2024-03-17 DIAGNOSIS — R09.81 SINUS CONGESTION: ICD-10-CM

## 2024-03-17 DIAGNOSIS — J02.9 SORE THROAT: ICD-10-CM

## 2024-03-17 LAB
CTP QC/QA: YES
MOLECULAR STREP A: POSITIVE
POC MOLECULAR INFLUENZA A AGN: NEGATIVE
POC MOLECULAR INFLUENZA B AGN: NEGATIVE
SARS-COV-2 RDRP RESP QL NAA+PROBE: NEGATIVE

## 2024-03-17 PROCEDURE — 1160F RVW MEDS BY RX/DR IN RCRD: CPT | Mod: ,,, | Performed by: FAMILY MEDICINE

## 2024-03-17 PROCEDURE — 4010F ACE/ARB THERAPY RXD/TAKEN: CPT | Mod: ,,, | Performed by: FAMILY MEDICINE

## 2024-03-17 PROCEDURE — 87651 STREP A DNA AMP PROBE: CPT | Mod: RHCUB | Performed by: FAMILY MEDICINE

## 2024-03-17 PROCEDURE — 87635 SARS-COV-2 COVID-19 AMP PRB: CPT | Mod: RHCUB | Performed by: FAMILY MEDICINE

## 2024-03-17 PROCEDURE — 87502 INFLUENZA DNA AMP PROBE: CPT | Mod: RHCUB | Performed by: FAMILY MEDICINE

## 2024-03-17 PROCEDURE — 99214 OFFICE O/P EST MOD 30 MIN: CPT | Mod: 25,,, | Performed by: FAMILY MEDICINE

## 2024-03-17 PROCEDURE — 1159F MED LIST DOCD IN RCRD: CPT | Mod: ,,, | Performed by: FAMILY MEDICINE

## 2024-03-17 PROCEDURE — 3074F SYST BP LT 130 MM HG: CPT | Mod: ,,, | Performed by: FAMILY MEDICINE

## 2024-03-17 PROCEDURE — 3079F DIAST BP 80-89 MM HG: CPT | Mod: ,,, | Performed by: FAMILY MEDICINE

## 2024-03-17 PROCEDURE — 96372 THER/PROPH/DIAG INJ SC/IM: CPT | Mod: ,,, | Performed by: FAMILY MEDICINE

## 2024-03-17 PROCEDURE — 3008F BODY MASS INDEX DOCD: CPT | Mod: ,,, | Performed by: FAMILY MEDICINE

## 2024-03-17 RX ORDER — LANCETS 30 GAUGE
EACH MISCELLANEOUS
COMMUNITY
Start: 2024-02-20

## 2024-03-17 RX ORDER — CEFTRIAXONE 500 MG/1
500 INJECTION, POWDER, FOR SOLUTION INTRAMUSCULAR; INTRAVENOUS
Status: COMPLETED | OUTPATIENT
Start: 2024-03-17 | End: 2024-03-17

## 2024-03-17 RX ORDER — AMOXICILLIN AND CLAVULANATE POTASSIUM 875; 125 MG/1; MG/1
1 TABLET, FILM COATED ORAL 2 TIMES DAILY
Qty: 14 TABLET | Refills: 0 | Status: SHIPPED | OUTPATIENT
Start: 2024-03-17 | End: 2024-03-24

## 2024-03-17 RX ORDER — DEXAMETHASONE SODIUM PHOSPHATE 4 MG/ML
3 INJECTION, SOLUTION INTRA-ARTICULAR; INTRALESIONAL; INTRAMUSCULAR; INTRAVENOUS; SOFT TISSUE
Status: COMPLETED | OUTPATIENT
Start: 2024-03-17 | End: 2024-03-17

## 2024-03-17 RX ADMIN — DEXAMETHASONE SODIUM PHOSPHATE 3 MG: 4 INJECTION, SOLUTION INTRA-ARTICULAR; INTRALESIONAL; INTRAMUSCULAR; INTRAVENOUS; SOFT TISSUE at 10:03

## 2024-03-17 RX ADMIN — CEFTRIAXONE 500 MG: 500 INJECTION, POWDER, FOR SOLUTION INTRAMUSCULAR; INTRAVENOUS at 10:03

## 2024-03-17 NOTE — PROGRESS NOTES
Subjective:       Patient ID: Jana Lopez is a 58 y.o. female.    Chief Complaint: Nasal Congestion (Sinus congestion with yellowish,blood streaked drainage x 2 days) and Sore Throat (Throat irritation. )    Sore Throat   Associated symptoms include congestion. Pertinent negatives include no abdominal pain, coughing, diarrhea, drooling, ear discharge, ear pain, headaches, neck pain, shortness of breath, stridor, trouble swallowing or vomiting.     Review of Systems   Constitutional:  Negative for activity change, appetite change, chills, diaphoresis, fatigue, fever and unexpected weight change.   HENT:  Positive for nasal congestion, postnasal drip, rhinorrhea, sinus pressure/congestion and sore throat. Negative for dental problem, drooling, ear discharge, ear pain, facial swelling, hearing loss, mouth sores, nosebleeds, sneezing, tinnitus, trouble swallowing, voice change and goiter.    Eyes:  Negative for photophobia, discharge, itching and visual disturbance.   Respiratory:  Negative for apnea, cough, choking, chest tightness, shortness of breath, wheezing and stridor.    Cardiovascular:  Negative for chest pain, palpitations, leg swelling and claudication.   Gastrointestinal:  Negative for abdominal distention, abdominal pain, anal bleeding, blood in stool, change in bowel habit, constipation, diarrhea, nausea and vomiting.   Endocrine: Negative for cold intolerance, heat intolerance, polydipsia, polyphagia and polyuria.   Genitourinary:  Negative for bladder incontinence, decreased urine volume, difficulty urinating, dysuria, enuresis, flank pain, frequency, hematuria, nocturia, pelvic pain and urgency.   Musculoskeletal:  Negative for arthralgias, back pain, gait problem, joint swelling, leg pain, myalgias, neck pain, neck stiffness and joint deformity.   Integumentary:  Negative for pallor, rash, wound, breast mass and breast tenderness.   Allergic/Immunologic: Negative for environmental allergies, food  allergies and immunocompromised state.   Neurological:  Negative for dizziness, vertigo, tremors, seizures, syncope, facial asymmetry, speech difficulty, weakness, light-headedness, numbness, headaches, memory loss and coordination difficulties.   Hematological:  Negative for adenopathy. Does not bruise/bleed easily.   Psychiatric/Behavioral:  Negative for agitation, behavioral problems, confusion, decreased concentration, dysphoric mood, hallucinations, self-injury, sleep disturbance and suicidal ideas. The patient is not nervous/anxious and is not hyperactive.    Breast: Negative for mass and tenderness        Objective:      Physical Exam  Vitals reviewed.   Constitutional:       Appearance: Normal appearance.   HENT:      Head: Normocephalic and atraumatic.      Right Ear: Tympanic membrane, ear canal and external ear normal.      Left Ear: Tympanic membrane, ear canal and external ear normal.      Nose: Congestion and rhinorrhea present.      Mouth/Throat:      Mouth: Mucous membranes are moist.      Pharynx: Oropharyngeal exudate and posterior oropharyngeal erythema present.   Eyes:      Extraocular Movements: Extraocular movements intact.      Conjunctiva/sclera: Conjunctivae normal.      Pupils: Pupils are equal, round, and reactive to light.   Cardiovascular:      Rate and Rhythm: Normal rate and regular rhythm.      Pulses: Normal pulses.      Heart sounds: Normal heart sounds.   Pulmonary:      Effort: Pulmonary effort is normal.      Breath sounds: Normal breath sounds.   Abdominal:      General: Bowel sounds are normal.      Palpations: Abdomen is soft.   Musculoskeletal:         General: Normal range of motion.      Cervical back: Normal range of motion and neck supple.   Skin:     General: Skin is warm and dry.   Neurological:      General: No focal deficit present.      Mental Status: She is alert. Mental status is at baseline.   Psychiatric:         Mood and Affect: Mood normal.         Behavior:  Behavior normal.         Thought Content: Thought content normal.         Judgment: Judgment normal.         Assessment:       1. Strep pharyngitis    2. Sore throat    3. Sinus congestion        Plan:     Strep pharyngitis  -     cefTRIAXone injection 500 mg  -     dexAMETHasone injection 3 mg  -     amoxicillin-clavulanate 875-125mg (AUGMENTIN) 875-125 mg per tablet; Take 1 tablet by mouth 2 (two) times a day. for 7 days  Dispense: 14 tablet; Refill: 0    Sore throat  -     POCT Strep A, Molecular    Sinus congestion  -     POCT Influenza A/B Molecular  -     POCT COVID-19 Rapid Screening

## 2024-03-27 NOTE — PROGRESS NOTES
"  Jana Lopez presented for a  Medicare AWV and comprehensive Health Risk Assessment today. The following components were reviewed and updated:    Medical history  Family History  Social history  Allergies and Current Medications  Health Risk Assessment  Health Maintenance  Care Team         ** See Completed Assessments for Annual Wellness Visit within the encounter summary.**         The following assessments were completed:  Living Situation  CAGE  Depression Screening  Timed Get Up and Go  Whisper Test  Cognitive Function Screening  Nutrition Screening  ADL Screening  PAQ Screening      Opioid documentation:      Patient does not have a current opioid prescription.        Vitals:    04/03/24 1028   BP: 126/84   BP Location: Right arm   Patient Position: Sitting   Pulse: 73   Resp: 16   Temp: 98.3 °F (36.8 °C)   TempSrc: Oral   SpO2: 98%   Weight: 90.7 kg (200 lb)   Height: 5' 3" (1.6 m)     Body mass index is 35.43 kg/m².  Physical Exam  Vitals and nursing note reviewed.   Constitutional:       Appearance: Normal appearance. She is obese.   HENT:      Head: Normocephalic.      Right Ear: External ear normal.      Left Ear: External ear normal.      Nose: Nose normal.      Mouth/Throat:      Mouth: Mucous membranes are moist.   Eyes:      Extraocular Movements: Extraocular movements intact.      Conjunctiva/sclera: Conjunctivae normal.      Pupils: Pupils are equal, round, and reactive to light.   Cardiovascular:      Rate and Rhythm: Normal rate and regular rhythm.      Pulses: Normal pulses.      Heart sounds: Normal heart sounds. No murmur heard.  Pulmonary:      Effort: Pulmonary effort is normal.      Breath sounds: Normal breath sounds. No stridor. No wheezing or rhonchi.   Abdominal:      General: Bowel sounds are normal. There is no distension.      Palpations: Abdomen is soft. There is no mass.      Tenderness: There is no abdominal tenderness.   Musculoskeletal:         General: No swelling or tenderness. " Normal range of motion.      Cervical back: Normal range of motion and neck supple.      Right lower leg: No edema.      Left lower leg: No edema.   Skin:     General: Skin is warm and dry.      Capillary Refill: Capillary refill takes less than 2 seconds.   Neurological:      General: No focal deficit present.      Mental Status: She is alert and oriented to person, place, and time. Mental status is at baseline.      Cranial Nerves: No cranial nerve deficit.      Sensory: No sensory deficit.      Motor: No weakness.   Psychiatric:         Mood and Affect: Mood normal.         Behavior: Behavior normal.         Thought Content: Thought content normal.         Judgment: Judgment normal.               Diagnoses and health risks identified today and associated recommendations/orders:    1. Welcome to Medicare preventive visit        2. Hypertension, unspecified type Well controlled chlorthalidone (HYGROTEN) 25 MG Tab    continue coreg, hygroten, losartan,      3. Hyperlipidemia, unspecified hyperlipidemia type      continue statin      4. Type 2 diabetes mellitus with other specified complication, with long-term current use of insulin  NOVOLIN 70-30 FLEXPEN U-100 100 unit/mL (70-30) InPn pen    Hemoglobin A1C    Hemoglobin A1C    continue 70/30 BID      5. Rheumatoid arthritis involving multiple sites, unspecified whether rheumatoid factor present      fu rheumotaology  continue actemra and mtx      6. SVT (supraventricular tachycardia)      continue coreg      7. Abnormality of gait and mobility        8. BMI 35.0-35.9,adult        9. Obesity, unspecified classification, unspecified obesity type, unspecified whether serious comorbidity present      healthy diet exercise and weight loss      10. Screening for colon cancer  Colonoscopy            Provided Jana with a 5-10 year written screening schedule and personal prevention plan. Recommendations were developed using the USPSTF age appropriate recommendations.  Education, counseling, and referrals were provided as needed. After Visit Summary printed and given to patient which includes a list of additional screenings\tests needed.    Follow up in about 1 year (around 4/3/2025).    ROGELIO Pacheco    Covid vaccine - declined, Pneumonia vaccine - declined, Tetanus vaccine - declined, Shingles vaccine - declined, Colon screening - Colonoscopy reordered this visit, Eye Exam - ERICK faxed.    I offered to discuss advanced care planning, including how to pick a person who would make decisions for you if you were unable to make them for yourself, called a health care power of , and what kind of decisions you might make such as use of life sustaining treatments such as ventilators and tube feeding when faced with a life limiting illness recorded on a living will that they will need to know. (How you want to be cared for as you near the end of your natural life)     X Patient is interested in learning more about how to make advanced directives.  I provided them paperwork and offered to discuss this with them.

## 2024-04-03 ENCOUNTER — OFFICE VISIT (OUTPATIENT)
Dept: FAMILY MEDICINE | Facility: CLINIC | Age: 59
End: 2024-04-03
Payer: MEDICARE

## 2024-04-03 VITALS
DIASTOLIC BLOOD PRESSURE: 84 MMHG | HEIGHT: 63 IN | OXYGEN SATURATION: 98 % | RESPIRATION RATE: 16 BRPM | HEART RATE: 73 BPM | WEIGHT: 200 LBS | BODY MASS INDEX: 35.44 KG/M2 | TEMPERATURE: 98 F | SYSTOLIC BLOOD PRESSURE: 126 MMHG

## 2024-04-03 DIAGNOSIS — R26.9 ABNORMALITY OF GAIT AND MOBILITY: ICD-10-CM

## 2024-04-03 DIAGNOSIS — M06.9 RHEUMATOID ARTHRITIS INVOLVING MULTIPLE SITES, UNSPECIFIED WHETHER RHEUMATOID FACTOR PRESENT: Chronic | ICD-10-CM

## 2024-04-03 DIAGNOSIS — E66.9 OBESITY, UNSPECIFIED CLASSIFICATION, UNSPECIFIED OBESITY TYPE, UNSPECIFIED WHETHER SERIOUS COMORBIDITY PRESENT: ICD-10-CM

## 2024-04-03 DIAGNOSIS — E11.69 TYPE 2 DIABETES MELLITUS WITH OTHER SPECIFIED COMPLICATION, WITH LONG-TERM CURRENT USE OF INSULIN: Chronic | ICD-10-CM

## 2024-04-03 DIAGNOSIS — Z00.00 WELCOME TO MEDICARE PREVENTIVE VISIT: Primary | ICD-10-CM

## 2024-04-03 DIAGNOSIS — I47.10 SVT (SUPRAVENTRICULAR TACHYCARDIA): ICD-10-CM

## 2024-04-03 DIAGNOSIS — E78.5 HYPERLIPIDEMIA, UNSPECIFIED HYPERLIPIDEMIA TYPE: Chronic | ICD-10-CM

## 2024-04-03 DIAGNOSIS — I10 HYPERTENSION, UNSPECIFIED TYPE: Chronic | ICD-10-CM

## 2024-04-03 DIAGNOSIS — Z79.4 TYPE 2 DIABETES MELLITUS WITH OTHER SPECIFIED COMPLICATION, WITH LONG-TERM CURRENT USE OF INSULIN: Chronic | ICD-10-CM

## 2024-04-03 DIAGNOSIS — Z12.11 SCREENING FOR COLON CANCER: ICD-10-CM

## 2024-04-03 PROCEDURE — G0402 INITIAL PREVENTIVE EXAM: HCPCS | Mod: ,,, | Performed by: NURSE PRACTITIONER

## 2024-04-03 PROCEDURE — 83036 HEMOGLOBIN GLYCOSYLATED A1C: CPT | Mod: ,,, | Performed by: CLINICAL MEDICAL LABORATORY

## 2024-04-03 PROCEDURE — 3079F DIAST BP 80-89 MM HG: CPT | Mod: ,,, | Performed by: NURSE PRACTITIONER

## 2024-04-03 PROCEDURE — 1159F MED LIST DOCD IN RCRD: CPT | Mod: ,,, | Performed by: NURSE PRACTITIONER

## 2024-04-03 PROCEDURE — 3074F SYST BP LT 130 MM HG: CPT | Mod: ,,, | Performed by: NURSE PRACTITIONER

## 2024-04-03 PROCEDURE — 4010F ACE/ARB THERAPY RXD/TAKEN: CPT | Mod: ,,, | Performed by: NURSE PRACTITIONER

## 2024-04-03 RX ORDER — CHLORTHALIDONE 25 MG/1
25 TABLET ORAL DAILY
Qty: 30 TABLET | Refills: 11 | Status: SHIPPED | OUTPATIENT
Start: 2024-04-03 | End: 2025-03-29

## 2024-04-03 RX ORDER — HUMAN INSULIN 100 [IU]/ML
INJECTION, SUSPENSION SUBCUTANEOUS
Qty: 12 ML | Refills: 3 | Status: SHIPPED | OUTPATIENT
Start: 2024-04-03

## 2024-04-03 RX ORDER — FOLIC ACID 1 MG/1
1 TABLET ORAL 2 TIMES DAILY
Qty: 180 TABLET | Refills: 3 | Status: SHIPPED | OUTPATIENT
Start: 2024-04-03 | End: 2025-03-29

## 2024-04-03 NOTE — LETTER
AUTHORIZATION FOR RELEASE OF   CONFIDENTIAL INFORMATION    Dear [facility],    We are seeing Jana Lopez, date of birth 1965, in the clinic at [facility]. Adri Kelly FNP is the patient's PCP. Jana Lopez has an outstanding lab/procedure at the time we reviewed her chart. In order to help keep her health information updated, she has authorized us to request the following medical record(s):        (  )  MAMMOGRAM                                      (  )  COLONOSCOPY      (  )  PAP SMEAR                                          (  )  OUTSIDE LAB RESULTS     (  )  DEXA SCAN                                          (  x)  EYE EXAM            (  )  FOOT EXAM                                          (  )  ENTIRE RECORD     (  )  OUTSIDE IMMUNIZATIONS                 (  )  _______________         Please fax records to [facility], Rogers, Melissa P, FNP, [phone]     If you have any questions, please contact  at [phone].           Patient Name: Jana Lopez  : 1965  Patient Phone #: [phone]

## 2024-04-03 NOTE — PATIENT INSTRUCTIONS
Counseling and Referral of Other Preventative  (Italic type indicates deductible and co-insurance are waived)    Patient Name: Jana Lopez  Today's Date: 4/3/2024    Health Maintenance       Date Due Completion Date    Pneumococcal Vaccines (Age 0-64) (1 of 2 - PCV) Never done ---    TETANUS VACCINE Never done ---    Shingles Vaccine (1 of 2) Never done ---    Colorectal Cancer Screening Never done ---    Eye Exam 03/23/2023 3/23/2022    COVID-19 Vaccine (4 - 2023-24 season) 09/01/2023 5/12/2022    Hemoglobin A1c 05/15/2024 11/15/2023    Foot Exam 11/15/2024 11/15/2023    Override on 11/15/2023: Done    Override on 1/21/2022: Done    Diabetes Urine Screening 12/18/2024 12/18/2023    Lipid Panel 02/20/2025 2/20/2024    Low Dose Statin 03/17/2025 3/17/2024    Mammogram 01/11/2026 1/11/2024        No orders of the defined types were placed in this encounter.    The following information is provided to all patients.  This information is to help you find resources for any of the problems found today that may be affecting your health:                  Living healthy guide: [place].gov    Understanding Diabetes: www.diabetes.org      Eating healthy: www.cdc.gov/healthyweight      CDC home safety checklist: www.cdc.gov/steadi/patient.html      Agency on Aging: [place].gov    Alcoholics anonymous (AA): www.aa.org      Physical Activity: www.carlitos.nih.gov/ik3ljfj      Tobacco use: [place].gov

## 2024-04-04 ENCOUNTER — PATIENT OUTREACH (OUTPATIENT)
Dept: ADMINISTRATIVE | Facility: HOSPITAL | Age: 59
End: 2024-04-04

## 2024-04-04 LAB
EST. AVERAGE GLUCOSE BLD GHB EST-MCNC: 123 MG/DL
HBA1C MFR BLD HPLC: 5.9 % (ref 4.5–6.6)

## 2024-04-04 NOTE — PROGRESS NOTES
Population Health Chart Review & Patient Outreach Details    Health Maintenance Topics Addressed and Outreach Outcomes / Actions Taken:  Diabetic Eye Exam [x] ERICK sent to [facility].

## 2024-04-04 NOTE — LETTER
AUTHORIZATION FOR RELEASE OF   CONFIDENTIAL INFORMATION    Dear Luz Garg,    We are seeing Jana Lopez, date of birth 1965, in the clinic at [facility]. Adri Kelly FNP is the patient's PCP. Jana Lopez has an outstanding lab/procedure at the time we reviewed her chart. In order to help keep her health information updated, she has authorized us to request the following medical record(s):        (  )  MAMMOGRAM                                      (  )  COLONOSCOPY      (  )  PAP SMEAR                                          (  )  OUTSIDE LAB RESULTS     (  )  DEXA SCAN                                          ( x )  EYE EXAM            (  )  FOOT EXAM                                          (  )  ENTIRE RECORD     (  )  OUTSIDE IMMUNIZATIONS                 (  )  _______________         Please fax records to Adarsh Art LPN Care Coordinator at [phone].      If you have any questions, please contact Adarsh at [phone].           Patient Name: Jana Lopez  : 1965  Patient Phone #: [phone]

## 2024-04-25 ENCOUNTER — OFFICE VISIT (OUTPATIENT)
Dept: FAMILY MEDICINE | Facility: CLINIC | Age: 59
End: 2024-04-25
Payer: MEDICARE

## 2024-04-25 VITALS
TEMPERATURE: 98 F | HEIGHT: 62 IN | DIASTOLIC BLOOD PRESSURE: 80 MMHG | HEART RATE: 61 BPM | SYSTOLIC BLOOD PRESSURE: 126 MMHG | BODY MASS INDEX: 36.8 KG/M2 | OXYGEN SATURATION: 95 % | WEIGHT: 200 LBS

## 2024-04-25 DIAGNOSIS — J01.01 ACUTE RECURRENT MAXILLARY SINUSITIS: Primary | ICD-10-CM

## 2024-04-25 DIAGNOSIS — R05.9 COUGH IN ADULT: ICD-10-CM

## 2024-04-25 LAB
CTP QC/QA: YES
CTP QC/QA: YES
POC MOLECULAR INFLUENZA A AGN: NEGATIVE
POC MOLECULAR INFLUENZA B AGN: NEGATIVE
SARS-COV-2 AG RESP QL IA.RAPID: NEGATIVE

## 2024-04-25 PROCEDURE — 96372 THER/PROPH/DIAG INJ SC/IM: CPT | Mod: ,,, | Performed by: NURSE PRACTITIONER

## 2024-04-25 PROCEDURE — 87426 SARSCOV CORONAVIRUS AG IA: CPT | Mod: RHCUB | Performed by: NURSE PRACTITIONER

## 2024-04-25 PROCEDURE — 3079F DIAST BP 80-89 MM HG: CPT | Mod: ,,, | Performed by: NURSE PRACTITIONER

## 2024-04-25 PROCEDURE — 4010F ACE/ARB THERAPY RXD/TAKEN: CPT | Mod: ,,, | Performed by: NURSE PRACTITIONER

## 2024-04-25 PROCEDURE — 3074F SYST BP LT 130 MM HG: CPT | Mod: ,,, | Performed by: NURSE PRACTITIONER

## 2024-04-25 PROCEDURE — 3008F BODY MASS INDEX DOCD: CPT | Mod: ,,, | Performed by: NURSE PRACTITIONER

## 2024-04-25 PROCEDURE — 99213 OFFICE O/P EST LOW 20 MIN: CPT | Mod: 25,,, | Performed by: NURSE PRACTITIONER

## 2024-04-25 PROCEDURE — 87502 INFLUENZA DNA AMP PROBE: CPT | Mod: RHCUB | Performed by: NURSE PRACTITIONER

## 2024-04-25 PROCEDURE — 1159F MED LIST DOCD IN RCRD: CPT | Mod: ,,, | Performed by: NURSE PRACTITIONER

## 2024-04-25 PROCEDURE — 3044F HG A1C LEVEL LT 7.0%: CPT | Mod: ,,, | Performed by: NURSE PRACTITIONER

## 2024-04-25 RX ORDER — LORATADINE 10 MG/1
10 TABLET ORAL DAILY
Qty: 90 TABLET | Refills: 0 | Status: SHIPPED | OUTPATIENT
Start: 2024-04-25 | End: 2024-04-30

## 2024-04-25 RX ORDER — AZELASTINE 1 MG/ML
1 SPRAY, METERED NASAL 2 TIMES DAILY
Qty: 30 ML | Refills: 2 | Status: SHIPPED | OUTPATIENT
Start: 2024-04-25 | End: 2025-04-25

## 2024-04-25 RX ORDER — AMOXICILLIN 500 MG/1
500 CAPSULE ORAL EVERY 12 HOURS
Qty: 20 CAPSULE | Refills: 0 | Status: SHIPPED | OUTPATIENT
Start: 2024-04-25

## 2024-04-25 RX ORDER — DEXAMETHASONE SODIUM PHOSPHATE 4 MG/ML
4 INJECTION, SOLUTION INTRA-ARTICULAR; INTRALESIONAL; INTRAMUSCULAR; INTRAVENOUS; SOFT TISSUE ONCE
Status: COMPLETED | OUTPATIENT
Start: 2024-04-25 | End: 2024-04-25

## 2024-04-25 RX ADMIN — DEXAMETHASONE SODIUM PHOSPHATE 4 MG: 4 INJECTION, SOLUTION INTRA-ARTICULAR; INTRALESIONAL; INTRAMUSCULAR; INTRAVENOUS; SOFT TISSUE at 04:04

## 2024-04-25 NOTE — PROGRESS NOTES
"Subjective:       Patient ID: Jana Lopez is a 58 y.o. female.    Vitals:  height is 5' 2" (1.575 m) and weight is 90.7 kg (200 lb). Her oral temperature is 98.1 °F (36.7 °C). Her blood pressure is 126/80 and her pulse is 61. Her oxygen saturation is 95%.     Chief Complaint: Cough (yesterday) and Nasal Congestion    Sinus Pain  Patient complains of congestion, cough, frequent clearing of the throat, headaches, nasal congestion, periorbital venous congestion, post nasal drip, sinus pressure, sneezing, sore throat, and spitting/vomiting mucous. Onset of symptoms was 1 day ago. Symptoms have been gradually worsening since that time. She is drinking plenty of fluids.  Past history is significant for nothing. Patient is non-smoker. She has tried an OTC decongestant with no relief.           Constitution: Positive for fatigue.   HENT:  Positive for congestion, postnasal drip, sinus pressure and sore throat. Negative for ear pain.    Respiratory:  Positive for cough and sputum production.    Gastrointestinal:  Negative for nausea and vomiting.   Allergic/Immunologic: Positive for environmental allergies and recurrent sinus infections. Negative for seasonal allergies.         Objective:      Physical Exam  Vitals reviewed.   Constitutional:       Appearance: She is obese. She is not ill-appearing.   HENT:      Right Ear: Hearing normal.      Left Ear: Hearing normal.      Nose: Congestion and rhinorrhea present.      Right Turbinates: Swollen.      Left Turbinates: Swollen.      Right Sinus: Maxillary sinus tenderness present.      Left Sinus: Maxillary sinus tenderness present.      Mouth/Throat:      Mouth: Mucous membranes are moist.      Pharynx: Posterior oropharyngeal erythema present. No pharyngeal swelling or oropharyngeal exudate.      Tonsils: No tonsillar exudate.      Comments: Postnasal drip  Cardiovascular:      Rate and Rhythm: Normal rate and regular rhythm.      Heart sounds: No murmur " heard.  Pulmonary:      Effort: Pulmonary effort is normal.      Breath sounds: Normal breath sounds.   Neurological:      Mental Status: She is alert and oriented to person, place, and time.      Gait: Gait normal.   Psychiatric:         Mood and Affect: Mood normal.         Behavior: Behavior normal.         Thought Content: Thought content normal.         Judgment: Judgment normal.              Assessment:       1. Acute recurrent maxillary sinusitis    2. Cough in adult          Recent Results (from the past 24 hour(s))   POCT Influenza A/B Molecular    Collection Time: 04/25/24  3:26 PM   Result Value Ref Range    POC Molecular Influenza A Ag Negative Negative    POC Molecular Influenza B Ag Negative Negative     Acceptable Yes    SARS Coronavirus 2 Antigen, POCT    Collection Time: 04/25/24  3:26 PM   Result Value Ref Range    SARS Coronavirus 2 Antigen Negative Negative     Acceptable Yes         Plan:         Acute recurrent maxillary sinusitis  -     dexAMETHasone injection 4 mg  -     amoxicillin (AMOXIL) 500 MG capsule; Take 1 capsule (500 mg total) by mouth every 12 (twelve) hours.  Dispense: 20 capsule; Refill: 0  -     loratadine (CLARITIN) 10 mg tablet; Take 1 tablet (10 mg total) by mouth once daily.  Dispense: 90 tablet; Refill: 0  -     azelastine (ASTELIN) 137 mcg (0.1 %) nasal spray; 1 spray (137 mcg total) by Nasal route 2 (two) times daily.  Dispense: 30 mL; Refill: 2    Cough in adult  -     POCT Influenza A/B Molecular  -     SARS Coronavirus 2 Antigen, POCT        Follow up if symptoms worsen or fail to improve.     Future Appointments   Date Time Provider Department Center   5/20/2024  9:30 AM Adri Kelly FNP [facility]   9/10/2024  7:30 AM [facility] GI ROOM 01 [facility]   1/16/2025 11:40 AM [facility]   4/9/2025 10:00 AM AWV NURSE, [facility]        An After  Visit Summary was printed and given to the patient.     Signature:    ROGELIO You  Family Nurse Practitioner  [facility]    Date of encounter: 4/25/24

## 2024-04-25 NOTE — PATIENT INSTRUCTIONS
-Please drink plenty of fluids.  -Please get plenty of rest.  -Please return here or go to the Emergency Department for any concerns or worsening of condition.  -If you were prescribed antibiotics, please take them to completion.  If you were given wait & see antibiotics, please wait 5-7 days before taking them, and only take them if your symptoms have worsened or not improved.  If you do begin taking the antibiotics, please take them to completion.  -If you were prescribed a narcotic medication, do not drive or operate heavy equipment or machinery while taking these medications.  -If you were given a steroid shot in the clinic and have also been given a prescription for a steroid such as Prednisone or a Medrol Dose Pack, please begin taking them tomorrow.  -If you do not have Hypertension or any history of palpitations, it is ok to take over the counter Sudafed or Mucinex D or Allegra-D or Claritin-D or Zyrtec-D.  -If you do take one of the above, it is ok to combine that with plain over the counter Mucinex or Allegra or Claritin or Zyrtec.  If for example you are taking Zyrtec -D, you can combine that with Mucinex, but not Mucinex-D.  If you are taking Mucinex-D, you can combine that with plain Allegra or Claritin or Zyrtec.   -If you do have Hypertension or palpitations, it is safe to take Coricidin HBP for relief of sinus symptoms.  -If not allergic, please take over the counter Tylenol (Acetaminophen) and/or Motrin (Ibuprofen) as directed for control of pain and/or fever.  -Please follow up with your primary care doctor or specialist as needed.  -If you  smoke, please stop smoking.

## 2024-04-29 ENCOUNTER — DOCUMENT SCAN (OUTPATIENT)
Dept: HOME HEALTH SERVICES | Facility: HOSPITAL | Age: 59
End: 2024-04-29
Payer: MEDICARE

## 2024-04-30 ENCOUNTER — OFFICE VISIT (OUTPATIENT)
Dept: FAMILY MEDICINE | Facility: CLINIC | Age: 59
End: 2024-04-30
Payer: MEDICARE

## 2024-04-30 ENCOUNTER — APPOINTMENT (OUTPATIENT)
Dept: RADIOLOGY | Facility: CLINIC | Age: 59
End: 2024-04-30
Attending: NURSE PRACTITIONER
Payer: MEDICARE

## 2024-04-30 VITALS
HEART RATE: 66 BPM | OXYGEN SATURATION: 98 % | TEMPERATURE: 98 F | BODY MASS INDEX: 36.07 KG/M2 | DIASTOLIC BLOOD PRESSURE: 81 MMHG | SYSTOLIC BLOOD PRESSURE: 135 MMHG | WEIGHT: 196 LBS | HEIGHT: 62 IN | RESPIRATION RATE: 18 BRPM

## 2024-04-30 DIAGNOSIS — M06.9 RHEUMATOID ARTHRITIS, INVOLVING UNSPECIFIED SITE, UNSPECIFIED WHETHER RHEUMATOID FACTOR PRESENT: ICD-10-CM

## 2024-04-30 DIAGNOSIS — E11.69 TYPE 2 DIABETES MELLITUS WITH OTHER SPECIFIED COMPLICATION, WITH LONG-TERM CURRENT USE OF INSULIN: ICD-10-CM

## 2024-04-30 DIAGNOSIS — E78.5 DYSLIPIDEMIA: ICD-10-CM

## 2024-04-30 DIAGNOSIS — K21.9 GASTRO-ESOPHAGEAL REFLUX DISEASE WITHOUT ESOPHAGITIS: ICD-10-CM

## 2024-04-30 DIAGNOSIS — R05.9 COUGH, UNSPECIFIED TYPE: ICD-10-CM

## 2024-04-30 DIAGNOSIS — I10 ESSENTIAL (PRIMARY) HYPERTENSION: ICD-10-CM

## 2024-04-30 DIAGNOSIS — Z79.4 TYPE 2 DIABETES MELLITUS WITH OTHER SPECIFIED COMPLICATION, WITH LONG-TERM CURRENT USE OF INSULIN: ICD-10-CM

## 2024-04-30 DIAGNOSIS — J45.901 EXACERBATION OF ASTHMA, UNSPECIFIED ASTHMA SEVERITY, UNSPECIFIED WHETHER PERSISTENT: Primary | ICD-10-CM

## 2024-04-30 DIAGNOSIS — J45.20 MILD INTERMITTENT ASTHMA WITHOUT COMPLICATION: ICD-10-CM

## 2024-04-30 PROCEDURE — 1160F RVW MEDS BY RX/DR IN RCRD: CPT | Mod: ,,, | Performed by: NURSE PRACTITIONER

## 2024-04-30 PROCEDURE — 3079F DIAST BP 80-89 MM HG: CPT | Mod: ,,, | Performed by: NURSE PRACTITIONER

## 2024-04-30 PROCEDURE — 3008F BODY MASS INDEX DOCD: CPT | Mod: ,,, | Performed by: NURSE PRACTITIONER

## 2024-04-30 PROCEDURE — 1159F MED LIST DOCD IN RCRD: CPT | Mod: ,,, | Performed by: NURSE PRACTITIONER

## 2024-04-30 PROCEDURE — 3075F SYST BP GE 130 - 139MM HG: CPT | Mod: ,,, | Performed by: NURSE PRACTITIONER

## 2024-04-30 PROCEDURE — 71046 X-RAY EXAM CHEST 2 VIEWS: CPT | Mod: TC,RHCUB,FY | Performed by: NURSE PRACTITIONER

## 2024-04-30 PROCEDURE — 99213 OFFICE O/P EST LOW 20 MIN: CPT | Mod: ,,, | Performed by: NURSE PRACTITIONER

## 2024-04-30 PROCEDURE — 4010F ACE/ARB THERAPY RXD/TAKEN: CPT | Mod: ,,, | Performed by: NURSE PRACTITIONER

## 2024-04-30 PROCEDURE — 3044F HG A1C LEVEL LT 7.0%: CPT | Mod: ,,, | Performed by: NURSE PRACTITIONER

## 2024-04-30 RX ORDER — SERTRALINE HYDROCHLORIDE 25 MG/1
25 TABLET, FILM COATED ORAL DAILY
COMMUNITY

## 2024-04-30 RX ORDER — FLUTICASONE PROPIONATE AND SALMETEROL 250; 50 UG/1; UG/1
1 POWDER RESPIRATORY (INHALATION)
COMMUNITY
End: 2024-04-30 | Stop reason: SDUPTHER

## 2024-04-30 RX ORDER — MONTELUKAST SODIUM 10 MG/1
10 TABLET ORAL NIGHTLY
COMMUNITY

## 2024-04-30 RX ORDER — FLUTICASONE FUROATE 50 UG/1
1 POWDER RESPIRATORY (INHALATION) DAILY
COMMUNITY
End: 2024-05-09

## 2024-04-30 RX ORDER — ALBUTEROL SULFATE 90 UG/1
2 AEROSOL, METERED RESPIRATORY (INHALATION) 4 TIMES DAILY PRN
Qty: 18 G | Refills: 1 | Status: SHIPPED | OUTPATIENT
Start: 2024-04-30

## 2024-04-30 RX ORDER — CYCLOSPORINE 0.5 MG/ML
1 EMULSION OPHTHALMIC 2 TIMES DAILY PRN
COMMUNITY

## 2024-04-30 RX ORDER — ALBUTEROL SULFATE 0.83 MG/ML
2.5 SOLUTION RESPIRATORY (INHALATION) EVERY 4 HOURS PRN
Qty: 60 ML | Refills: 1 | Status: SHIPPED | OUTPATIENT
Start: 2024-04-30

## 2024-04-30 RX ORDER — BENZONATATE 100 MG/1
100 CAPSULE ORAL 3 TIMES DAILY PRN
Qty: 30 CAPSULE | Refills: 0 | Status: SHIPPED | OUTPATIENT
Start: 2024-04-30 | End: 2024-05-10

## 2024-04-30 NOTE — PROGRESS NOTES
ROGELIO Pacheco        PATIENT NAME: Jana Lopez  : 1965  DATE: 24  MRN: [id number]      Patient PCP Information       Provider PCP Type    ROGELIO Pacheco General            Reason for Visit / Chief Complaint: Cough (Went to clinic last week got shots and now feels like chest cough. Needing a machine for breathing treatment.)         History of Present Illness / Problem Focused Workflow     Jana Lopez presents to the clinic with Cough (Went to clinic last week got shots and now feels like chest cough. Needing a machine for breathing treatment.)     Cough  Patient presents to clinic with concern for cough and sinus congestion. [facility] prescribed [facility] and antibiotic. Patient states her home nebulizer machine broke. Also in need of refill on inhaler. Patient reports cough worse at night.       Medical / Social / Family History     Past Medical History:   Diagnosis Date    Anemia     Asthma     Dyslipidemia     Hypertension     HIRA (obstructive sleep apnea)     Personal history of kidney stones     Rheumatoid arthritis     SVT (supraventricular tachycardia)     follow[facility] at [facility],     Type 2 diabetes mellitus 2023       Past Surgical History:   Procedure Laterality Date    CHOLECYSTECTOMY      HYSTERECTOMY      LITHOTRIPSY         Social History  Ms.  reports that she has never smoked. She has never been exposed to tobacco smoke. She has never used smokeless tobacco. She reports that she does not currently use alcohol. She reports that she does not use drugs.    Family History  Ms.'s family history includes Diabetes in an other family member; Heart disease in her father and mother; Hypertension in her daughter and father; Stroke in an other family member.    Medications and Allergies     Medications  Current Outpatient Medications   Medication Sig Dispense Refill    amoxicillin (AMOXIL) 500 MG capsule Take 1 capsule (500 mg total) by mouth  every 12 (twelve) hours. 20 capsule 0    aspirin (ECOTRIN) 81 MG EC tablet Take 81 mg by mouth once daily.      azelastine (ASTELIN) 137 mcg (0.1 %) nasal spray 1 spray (137 mcg total) by Nasal route 2 (two) times daily. 30 mL 2    blood sugar diagnostic Strp 1 strip by Misc.(Non-Drug; Combo Route) route once daily. 100 each 1    blood-glucose meter kit Use as instructed 1 each 0    carvediloL (COREG) 12.5 MG tablet Take 1 tablet (12.5 mg total) by mouth 2 (two) times daily. 60 tablet 11    cetirizine (ZYRTEC) 10 MG tablet TAKE 1 TABLET BY MOUTH EVERY DAY AS NEEDED FOR ALLERGIES 90 tablet 3    chlorthalidone (HYGROTEN) 25 MG Tab Take 1 tablet (25 mg total) by mouth once daily. 30 tablet 11    cycloSPORINE (RESTASIS) 0.05 % ophthalmic emulsion Apply 1 drop to eye 2 (two) times daily as needed (.).      diclofenac sodium (VOLTAREN) 1 % Gel APPLY 2 GRAMS TOPICALLY THREE TIMES DAILY 100 g 1    ferrous sulfate (IRON) 325 mg (65 mg iron) Tab tablet Take 1 tablet by mouth once daily.      fluticasone propionate (FLONASE) 50 mcg/actuation nasal spray 1 spray by Each Nostril route as needed for Allergies.      folic acid (FOLVITE) 1 MG tablet Take 1 tablet (1 mg total) by mouth 2 (two) times daily. 180 tablet 3    gabapentin (NEURONTIN) 100 MG capsule Take 1 capsule (100 mg total) by mouth 3 (three) times daily. 90 capsule 11    losartan (COZAAR) 100 MG tablet Take 1 tablet (100 mg total) by mouth once daily. 30 tablet 11    magnesium 250 mg Tab Take 1 tablet (250 mg total) by mouth once daily. 90 tablet 3    methotrexate 2.5 MG Tab Take 10 mg by mouth every 7 days.      mometasone (NASONEX) 50 mcg/actuation nasal spray 2 sprays by Nasal route once daily. 17 g 2    montelukast (SINGULAIR) 10 mg tablet Take 10 mg by mouth every evening.      NOVOLIN 70-30 FLEXPEN U-100 100 unit/mL (70-30) InPn pen INJECT 27 UNITS UNDER THE SKIN EVERY MORNING AND 20 UNITS EVERY EVENING 12 mL 3    omeprazole (PRILOSEC) 20 MG capsule Take 20 mg  "by mouth every morning.      ONETOUCH VERIO FLEX METER [facility] [place] USE TO MONITOR BLOOD GLUCOSE      pen needle, diabetic 32 gauge x 1/4" Ndle 1 Needle by Misc.(Non-Drug; Combo Route) route 2 (two) times a day. 100 each 1    potassium chloride (KLOR-CON) 8 MEQ TbSR Take 1 tablet (8 mEq total) by mouth once daily. 90 tablet 3    sertraline (ZOLOFT) 25 MG tablet Take 25 mg by mouth once daily.      simvastatin (ZOCOR) 10 MG tablet Take 1 tablet (10 mg total) by mouth every evening. 90 tablet 3    tocilizumab (ACTEMRA) 162 mg/0.9 mL injection Inject 162 mg into the skin every 30 days.      albuterol (PROVENTIL) 2.5 mg /3 mL (0.083 %) nebulizer solution Take 3 mLs (2.5 mg total) by nebulization every 4 (four) hours as needed for Wheezing. 60 mL 1    albuterol (PROVENTIL/VENTOLIN HFA) 90 mcg/actuation inhaler Inhale 2 puffs into the lungs 4 (four) times daily as needed for Wheezing. 18 g 1    benzonatate (TESSALON) 100 MG capsule Take 1 capsule (100 mg total) by mouth 3 (three) times daily as needed for Cough. 30 capsule 0    fluticasone furoate (ARNUITY ELLIPTA) 50 mcg/actuation inhaler Inhale 1 puff into the lungs once daily.       No current facility-administered medications for this visit.       Allergies  Review of patient's allergies indicates:   Allergen Reactions    Lisinopril Other (See Comments)     cough       Physical Examination     Vitals:    04/30/24 0827   BP: 135/81   BP Location: Left arm   Patient Position: Sitting   BP Method: Medium (Automatic)   Pulse: 66   Resp: 18   Temp: 98 °F (36.7 °C)   TempSrc: Oral   SpO2: 98%   Weight: 88.9 kg (196 lb)   Height: 5' 2" (1.575 m)       Physical Exam  Vitals reviewed.   Constitutional:       Appearance: Normal appearance.   HENT:      Head: Normocephalic.      Right Ear: External ear normal.      Left Ear: External ear normal.      Nose: Nose normal.      Mouth/Throat:      Mouth: Mucous membranes are moist.      Pharynx: Posterior oropharyngeal erythema present. "   Eyes:      Extraocular Movements: Extraocular movements intact.   Cardiovascular:      Rate and Rhythm: Normal rate and regular rhythm.      Pulses: Normal pulses.      Heart sounds: Normal heart sounds.   Pulmonary:      Effort: Pulmonary effort is normal. No respiratory distress.      Breath sounds: Normal breath sounds. No stridor. No wheezing, rhonchi or rales.   Chest:      Chest wall: No tenderness.   Musculoskeletal:         General: Normal range of motion.      Cervical back: Normal range of motion.   Skin:     General: Skin is warm and dry.      Capillary Refill: Capillary refill takes less than 2 seconds.   Neurological:      General: No focal deficit present.      Mental Status: She is alert and oriented to person, place, and time.   Psychiatric:         Mood and Affect: Mood normal.         Behavior: Behavior normal.         Thought Content: Thought content normal.         Judgment: Judgment normal.         Office Visit on 04/25/2024   Component Date Value Ref Range Status    POC Molecular Influenza A Ag 04/25/2024 Negative  Negative Final    POC Molecular Influenza B Ag 04/25/2024 Negative  Negative Final     Acceptable 04/25/2024 Yes   Final    SARS Coronavirus 2 Antigen 04/25/2024 Negative  Negative Final     Acceptable 04/25/2024 Yes   Final         Chest XR reviewed   Narrative & Impression  EXAMINATION:  XR CHEST PA AND LATERAL     CLINICAL HISTORY:  Cough, unspecified     TECHNIQUE:  PA and lateral views of the chest were performed.     COMPARISON:  12/1/2022     FINDINGS:  Heart size normal.  Lungs clear.  No pneumothorax or pleural effusion.     Impression:     No acute findings.        Electronically signed by:Marcos Hernandes  Date:                                            04/30/2024  Time:                                           09:19           Exam Ended: 04/30/24 09:08 CDT             Assessment and Plan (including Health Maintenance)      Problem List  Smart  Sets  Document Outside HM   :    Plan:     1. Exacerbation of asthma, unspecified asthma severity, unspecified whether persistent  -     NEBULIZER FOR HOME USE  -     albuterol (PROVENTIL/VENTOLIN HFA) 90 mcg/actuation inhaler; Inhale 2 puffs into the lungs 4 (four) times daily as needed for Wheezing.  Dispense: 18 g; Refill: 1  -     albuterol (PROVENTIL) 2.5 mg /3 mL (0.083 %) nebulizer solution; Take 3 mLs (2.5 mg total) by nebulization every 4 (four) hours as needed for Wheezing.  Dispense: 60 mL; Refill: 1    2. Cough, unspecified type  -     X-Ray Chest PA And Lateral; Future; Expected date: 04/30/2024  -     benzonatate (TESSALON) 100 MG capsule; Take 1 capsule (100 mg total) by mouth 3 (three) times daily as needed for Cough.  Dispense: 30 capsule; Refill: 0    3. Mild intermittent asthma without complication  Assessment & Plan:  Albuterol neb or inhaler prn   Continue singulair daily        4. Essential (primary) hypertension  Assessment & Plan:  Well controlled  Continue coreg 12.5mg BID, losartan 100 mg daily      5. Rheumatoid arthritis, involving unspecified site, unspecified whether rheumatoid factor present  Comments:  Julee Quan Rheumatologist NP  Assessment & Plan:  Followed by Rheumatology  Continue methotrexate and actemra as ordered       6. Dyslipidemia  Assessment & Plan:  Continue simvastatin 10 mg daily      7. Gastro-esophageal reflux disease without esophagitis  Assessment & Plan:  Continue omeprazole daily      8. Type 2 diabetes mellitus with other specified complication, with long-term current use of insulin  Assessment & Plan:  Continue novolin 70/30 - 27 units every am 20 units every pm        RTC in May as scheduled   There are no Patient Instructions on file for this visit.       Health Maintenance Due   Topic Date Due    Pneumococcal Vaccines (Age 0-64) (1 of 2 - PCV) Never done    TETANUS VACCINE  Never done    Shingles Vaccine (1 of 2) Never done    Colorectal Cancer Screening   Never done    Eye Exam  03/23/2023    COVID-19 Vaccine (4 - 2023-24 season) 09/01/2023       Most Recent Immunizations   Administered Date(s) Administered    COVID-19, MRNA, LN-S, PF (Pfizer) (Purple Cap) 05/12/2022    Influenza - Quadrivalent - PF *Preferred* (6 months and older) 12/18/2023            Health Maintenance Topics with due status: Not Due       Topic Last Completion Date    Foot Exam 11/15/2023    Diabetes Urine Screening 12/18/2023    Mammogram 01/11/2024    Lipid Panel 02/20/2024    Hemoglobin A1c 04/03/2024    Low Dose Statin 04/30/2024       Future Appointments   Date Time Provider Department Center   5/20/2024  9:30 AM Adri Kelly FNP [facility]   9/10/2024  7:30 AM [email or web address] GI ROOM 01 [facility]   1/16/2025 11:40 AM [facility]1 [facility] [place]   4/9/2025 10:00 AM AWV NURSE, [facility]            Signature:  ROGELIO Pacheco  [facility]     Date of encounter: 4/30/24

## 2024-05-09 DIAGNOSIS — J45.909 MODERATE ASTHMA, UNSPECIFIED WHETHER COMPLICATED, UNSPECIFIED WHETHER PERSISTENT: Primary | ICD-10-CM

## 2024-05-09 RX ORDER — BUDESONIDE AND FORMOTEROL FUMARATE DIHYDRATE 160; 4.5 UG/1; UG/1
2 AEROSOL RESPIRATORY (INHALATION) EVERY 12 HOURS
Qty: 10.2 G | Refills: 11 | Status: SHIPPED | OUTPATIENT
Start: 2024-05-09 | End: 2025-05-09

## 2024-06-25 DIAGNOSIS — J01.01 ACUTE RECURRENT MAXILLARY SINUSITIS: ICD-10-CM

## 2024-06-25 DIAGNOSIS — M19.90 INFLAMMATORY ARTHRITIS: ICD-10-CM

## 2024-06-25 RX ORDER — GABAPENTIN 100 MG/1
100 CAPSULE ORAL 3 TIMES DAILY
Qty: 90 CAPSULE | Refills: 0 | Status: SHIPPED | OUTPATIENT
Start: 2024-06-25

## 2024-06-29 RX ORDER — AMOXICILLIN 500 MG/1
CAPSULE ORAL
Qty: 20 CAPSULE | Refills: 0 | OUTPATIENT
Start: 2024-06-29

## 2024-07-09 ENCOUNTER — PATIENT MESSAGE (OUTPATIENT)
Dept: FAMILY MEDICINE | Facility: CLINIC | Age: 59
End: 2024-07-09
Payer: MEDICARE

## 2024-07-11 DIAGNOSIS — Z12.11 SCREENING FOR COLON CANCER: Primary | ICD-10-CM

## 2024-07-11 RX ORDER — POLYETHYLENE GLYCOL 3350, SODIUM SULFATE ANHYDROUS, SODIUM BICARBONATE, SODIUM CHLORIDE, POTASSIUM CHLORIDE 236; 22.74; 6.74; 5.86; 2.97 G/4L; G/4L; G/4L; G/4L; G/4L
4 POWDER, FOR SOLUTION ORAL ONCE
Qty: 4000 ML | Refills: 0 | Status: SHIPPED | OUTPATIENT
Start: 2024-07-11 | End: 2024-07-11

## 2024-07-29 DIAGNOSIS — Z79.4 TYPE 2 DIABETES MELLITUS WITH OTHER SPECIFIED COMPLICATION, WITH LONG-TERM CURRENT USE OF INSULIN: Chronic | ICD-10-CM

## 2024-07-29 DIAGNOSIS — E11.69 TYPE 2 DIABETES MELLITUS WITH OTHER SPECIFIED COMPLICATION, WITH LONG-TERM CURRENT USE OF INSULIN: Chronic | ICD-10-CM

## 2024-07-29 RX ORDER — HUMAN INSULIN 100 [IU]/ML
INJECTION, SUSPENSION SUBCUTANEOUS
Qty: 12 ML | Refills: 3 | Status: SHIPPED | OUTPATIENT
Start: 2024-07-29

## 2024-07-29 NOTE — TELEPHONE ENCOUNTER
----- Message from Prema Hernandez sent at 7/29/2024 10:16 AM CDT -----  Pt called and stated that the pharmacy all around does not have any NOVOLIN 70-30 FLEXPEN U-100 100 unit/mL (70-30) InPn pen and wonder what she can do cause she is out [phone]

## 2024-08-09 DIAGNOSIS — Z71.89 COMPLEX CARE COORDINATION: ICD-10-CM

## 2024-08-26 DIAGNOSIS — Z12.11 SCREEN FOR COLON CANCER: Primary | ICD-10-CM

## 2024-08-26 RX ORDER — POLYETHYLENE GLYCOL 3350, SODIUM SULFATE ANHYDROUS, SODIUM BICARBONATE, SODIUM CHLORIDE, POTASSIUM CHLORIDE 236; 22.74; 6.74; 5.86; 2.97 G/4L; G/4L; G/4L; G/4L; G/4L
4 POWDER, FOR SOLUTION ORAL ONCE
Qty: 4000 ML | Refills: 0 | Status: SHIPPED | OUTPATIENT
Start: 2024-08-26 | End: 2024-08-26

## 2024-09-02 DIAGNOSIS — M19.90 INFLAMMATORY ARTHRITIS: ICD-10-CM

## 2024-09-03 RX ORDER — GABAPENTIN 100 MG/1
100 CAPSULE ORAL 3 TIMES DAILY
Qty: 90 CAPSULE | Refills: 0 | Status: SHIPPED | OUTPATIENT
Start: 2024-09-03

## 2024-09-09 LAB
LEFT EYE DM RETINOPATHY: NEGATIVE
RIGHT EYE DM RETINOPATHY: NEGATIVE

## 2024-10-02 ENCOUNTER — OFFICE VISIT (OUTPATIENT)
Dept: FAMILY MEDICINE | Facility: CLINIC | Age: 59
End: 2024-10-02
Payer: MEDICARE

## 2024-10-02 VITALS
HEIGHT: 62 IN | WEIGHT: 191.88 LBS | SYSTOLIC BLOOD PRESSURE: 133 MMHG | DIASTOLIC BLOOD PRESSURE: 77 MMHG | OXYGEN SATURATION: 95 % | TEMPERATURE: 99 F | HEART RATE: 75 BPM | BODY MASS INDEX: 35.31 KG/M2 | RESPIRATION RATE: 20 BRPM

## 2024-10-02 DIAGNOSIS — M79.89 FOOT SWELLING: Primary | ICD-10-CM

## 2024-10-02 LAB — URATE SERPL-MCNC: 7.6 MG/DL (ref 2.6–6)

## 2024-10-02 PROCEDURE — 4010F ACE/ARB THERAPY RXD/TAKEN: CPT | Mod: ,,, | Performed by: NURSE PRACTITIONER

## 2024-10-02 PROCEDURE — 3008F BODY MASS INDEX DOCD: CPT | Mod: ,,, | Performed by: NURSE PRACTITIONER

## 2024-10-02 PROCEDURE — 84550 ASSAY OF BLOOD/URIC ACID: CPT | Mod: ,,, | Performed by: CLINICAL MEDICAL LABORATORY

## 2024-10-02 PROCEDURE — 3078F DIAST BP <80 MM HG: CPT | Mod: ,,, | Performed by: NURSE PRACTITIONER

## 2024-10-02 PROCEDURE — 96372 THER/PROPH/DIAG INJ SC/IM: CPT | Mod: ,,, | Performed by: NURSE PRACTITIONER

## 2024-10-02 PROCEDURE — 99213 OFFICE O/P EST LOW 20 MIN: CPT | Mod: 25,,, | Performed by: NURSE PRACTITIONER

## 2024-10-02 PROCEDURE — 1160F RVW MEDS BY RX/DR IN RCRD: CPT | Mod: ,,, | Performed by: NURSE PRACTITIONER

## 2024-10-02 PROCEDURE — 3075F SYST BP GE 130 - 139MM HG: CPT | Mod: ,,, | Performed by: NURSE PRACTITIONER

## 2024-10-02 PROCEDURE — 3044F HG A1C LEVEL LT 7.0%: CPT | Mod: ,,, | Performed by: NURSE PRACTITIONER

## 2024-10-02 PROCEDURE — 1159F MED LIST DOCD IN RCRD: CPT | Mod: ,,, | Performed by: NURSE PRACTITIONER

## 2024-10-02 RX ORDER — DEXAMETHASONE SODIUM PHOSPHATE 4 MG/ML
4 INJECTION, SOLUTION INTRA-ARTICULAR; INTRALESIONAL; INTRAMUSCULAR; INTRAVENOUS; SOFT TISSUE
Status: COMPLETED | OUTPATIENT
Start: 2024-10-02 | End: 2024-10-02

## 2024-10-02 RX ORDER — CLINDAMYCIN HYDROCHLORIDE 300 MG/1
300 CAPSULE ORAL EVERY 12 HOURS
Qty: 14 CAPSULE | Refills: 0 | Status: SHIPPED | OUTPATIENT
Start: 2024-10-02 | End: 2024-10-09

## 2024-10-02 RX ORDER — CEPHALEXIN 500 MG/1
500 CAPSULE ORAL EVERY 8 HOURS
COMMUNITY
Start: 2024-09-28

## 2024-10-02 RX ORDER — FAMOTIDINE 20 MG/1
20 TABLET, FILM COATED ORAL 2 TIMES DAILY
COMMUNITY
Start: 2024-09-28

## 2024-10-02 RX ORDER — PREDNISONE 10 MG/1
TABLET ORAL
Qty: 14 TABLET | Refills: 0 | Status: SHIPPED | OUTPATIENT
Start: 2024-10-02 | End: 2024-10-08

## 2024-10-02 RX ADMIN — DEXAMETHASONE SODIUM PHOSPHATE 4 MG: 4 INJECTION, SOLUTION INTRA-ARTICULAR; INTRALESIONAL; INTRAMUSCULAR; INTRAVENOUS; SOFT TISSUE at 09:10

## 2024-10-02 NOTE — PROGRESS NOTES
ROGELIO Pacheco        PATIENT NAME: Jana Lopez  : 1965  DATE: 10/2/24  MRN: [id number]      Patient PCP Information       Provider PCP Type    Adri CT ROGELIO [facility]            Reason for Visit / Chief Complaint: Insect Bite (Pt presents to the clinic for insect bite on right foot second toe she think its a spider bite bc daughter saw a spider crawl up the curtain after she gotten bitten)       Update PCP  Update Chief Complaint         History of Present Illness / Problem Focused Workflow     Jana Lopez presents to the clinic with Insect Bite (Pt presents to the clinic for insect bite on right foot second toe she think its a spider bite bc daughter saw a spider crawl up the curtain after she gotten bitten)     HPI  Patient presents to clinic with concern for swelling to foot/great toe joint. Denies hx of gout. Was concerned for spider bite. No bite aiden or skin lesion noted on exam. Redness and swelling/pain to foot. No known accident.    Medical / Social / Family History     Past Medical History:   Diagnosis Date    Anemia     Asthma     Dyslipidemia     Hypertension     HIRA (obstructive sleep apnea)     Personal history of kidney stones     Rheumatoid arthritis     SVT (supraventricular tachycardia)     followed at [facility],     Type 2 diabetes mellitus 2023       Past Surgical History:   Procedure Laterality Date    CHOLECYSTECTOMY      HYSTERECTOMY      LITHOTRIPSY         Social History  Ms.  reports that she has never smoked. She has never been exposed to tobacco smoke. She has never used smokeless tobacco. She reports that she does not currently use alcohol. She reports that she does not use drugs.    Family History  Ms.'s family history includes Diabetes in an other family member; Heart disease in her father and mother; Hypertension in her daughter and father; Stroke in an other family member.    Medications and Allergies     Medications  Outpatient Medications  Marked as Taking for the 10/2/24 encounter (Office Visit) with Adri Kelly FNP   Medication Sig Dispense Refill    albuterol (PROVENTIL) 2.5 mg /3 mL (0.083 %) nebulizer solution Take 3 mLs (2.5 mg total) by nebulization every 4 (four) hours as needed for Wheezing. 60 mL 1    albuterol (PROVENTIL/VENTOLIN HFA) 90 mcg/actuation inhaler Inhale 2 puffs into the lungs 4 (four) times daily as needed for Wheezing. 18 g 1    aspirin (ECOTRIN) 81 MG EC tablet Take 81 mg by mouth once daily.      azelastine (ASTELIN) 137 mcg (0.1 %) nasal spray 1 spray (137 mcg total) by Nasal route 2 (two) times daily. 30 mL 2    blood sugar diagnostic Strp 1 strip by Misc.(Non-Drug; Combo Route) route once daily. 100 each 1    budesonide-formoterol 160-4.5 mcg (SYMBICORT) 160-4.5 mcg/actuation HFAA Inhale 2 puffs into the lungs every 12 (twelve) hours. Controller 10.2 g 11    carvediloL (COREG) 12.5 MG tablet Take 1 tablet (12.5 mg total) by mouth 2 (two) times daily. 60 tablet 11    cephALEXin (KEFLEX) 500 MG capsule Take 500 mg by mouth every 8 (eight) hours.      cetirizine (ZYRTEC) 10 MG tablet TAKE 1 TABLET BY MOUTH EVERY DAY AS NEEDED FOR ALLERGIES 90 tablet 3    chlorthalidone (HYGROTEN) 25 MG Tab Take 1 tablet (25 mg total) by mouth once daily. 30 tablet 11    diclofenac sodium (VOLTAREN) 1 % Gel APPLY 2 GRAMS TOPICALLY THREE TIMES DAILY 100 g 1    famotidine (PEPCID) 20 MG tablet Take 20 mg by mouth 2 (two) times daily.      ferrous sulfate (IRON) 325 mg (65 mg iron) Tab tablet Take 1 tablet by mouth once daily.      fluorometholone 0.1% (FML) 0.1 % DrpS Apply 1 drop to eye.      fluticasone furoate (ARNUITY ELLIPTA) 50 mcg/actuation inhaler Inhale 50 mcg into the lungs.      fluticasone propionate (FLONASE) 50 mcg/actuation nasal spray 1 spray by Each Nostril route as needed for Allergies.      folic acid (FOLVITE) 1 MG tablet Take 1 tablet (1 mg total) by mouth 2 (two) times daily. 180 tablet 3    gabapentin (NEURONTIN)  "100 MG capsule TAKE 1 CAPSULE(100 MG) BY MOUTH THREE TIMES DAILY 90 capsule 0    losartan (COZAAR) 100 MG tablet Take 1 tablet (100 mg total) by mouth once daily. 30 tablet 11    magnesium 250 mg Tab Take 1 tablet (250 mg total) by mouth once daily. 90 tablet 3    methotrexate 2.5 MG Tab Take 10 mg by mouth every 7 days.      mometasone (NASONEX) 50 mcg/actuation nasal spray 2 sprays by Nasal route once daily. 17 g 2    montelukast (SINGULAIR) 10 mg tablet Take 10 mg by mouth every evening.      NOVOLIN 70-30 FLEXPEN U-100 100 unit/mL (70-30) InPn pen INJECT 27 UNITS UNDER THE SKIN EVERY MORNING AND 20 UNITS EVERY EVENING 12 mL 3    pen needle, diabetic 32 gauge x 1/4" Ndle 1 Needle by Misc.(Non-Drug; Combo Route) route 2 (two) times a day. 100 each 1    potassium chloride (KLOR-CON) 8 MEQ TbSR Take 1 tablet (8 mEq total) by mouth once daily. 90 tablet 3    REFRESH CELLUVISC 1 % DpGe Place 1 drop into both eyes 4 (four) times daily.      sertraline (ZOLOFT) 25 MG tablet Take 25 mg by mouth once daily.      simvastatin (ZOCOR) 10 MG tablet Take 1 tablet (10 mg total) by mouth every evening. 90 tablet 3    tocilizumab (ACTEMRA) 162 mg/0.9 mL injection Inject 162 mg into the skin every 30 days.       Current Facility-Administered Medications for the 10/2/24 encounter (Office Visit) with Adri Kelly FNP   Medication Dose Route Frequency Provider Last Rate Last Admin    [COMPLETED] dexAMETHasone injection 4 mg  4 mg Intramuscular 1 time in Clinic/HOD Adri Kelly FNP   4 mg at 10/02/24 0900       Allergies  Review of patient's allergies indicates:   Allergen Reactions    Lisinopril Other (See Comments) and Swelling     cough       Physical Examination     Vitals:    10/02/24 0828   BP: 133/77   BP Location: Right arm   Patient Position: Sitting   Pulse: 75   Resp: 20   Temp: 98.8 °F (37.1 °C)   TempSrc: Oral   SpO2: 95%   Weight: 87 kg (191 lb 14.4 oz)   Height: 5' 2" (1.575 m)       Physical Exam  Vitals " reviewed.   HENT:      Head: Normocephalic.      Right Ear: External ear normal.      Left Ear: External ear normal.      Mouth/Throat:      Mouth: Mucous membranes are moist.   Eyes:      Extraocular Movements: Extraocular movements intact.   Cardiovascular:      Rate and Rhythm: Normal rate.   Pulmonary:      Effort: Pulmonary effort is normal.   Musculoskeletal:         General: Normal range of motion.      Cervical back: Normal range of motion.      Right foot: Swelling and tenderness present.        Legs:    Skin:     General: Skin is warm.   Neurological:      General: No focal deficit present.      Mental Status: She is alert and oriented to person, place, and time.   Psychiatric:         Mood and Affect: Mood normal.         Behavior: Behavior normal.         Thought Content: Thought content normal.         Judgment: Judgment normal.           Office Visit on 10/02/2024   Component Date Value Ref Range Status    Uric Acid 10/02/2024 7.6 (H)  2.6 - 6.0 mg/dL Final             Assessment and Plan (including Health Maintenance)      Problem List  Smart Sets  Document Outside HM   :    Plan:     1. Foot swelling  -     Uric Acid; Future; Expected date: 10/02/2024  -     dexAMETHasone injection 4 mg  -     predniSONE (DELTASONE) 10 MG tablet; Take 4 tablets (40 mg total) by mouth once daily for 2 days, THEN 2 tablets (20 mg total) once daily for 2 days, THEN 1 tablet (10 mg total) once daily for 2 days.  Dispense: 14 tablet; Refill: 0  -     clindamycin (CLEOCIN) 300 MG capsule; Take 1 capsule (300 mg total) by mouth every 12 (twelve) hours. for 7 days  Dispense: 14 capsule; Refill: 0    Gout vs skin infection.   Follow up if not improving.     There are no Patient Instructions on file for this visit.       Health Maintenance Due   Topic Date Due    Pneumococcal Vaccines (Age 0-64) (1 of 2 - PCV) Never done    TETANUS VACCINE  Never done    Shingles Vaccine (1 of 2) Never done    Colorectal Cancer Screening   Never done    Eye Exam  03/23/2023    Influenza Vaccine (1) 09/01/2024    COVID-19 Vaccine (4 - 2024-25 season) 09/01/2024    Hemoglobin A1c  10/03/2024    Foot Exam  11/15/2024       Most Recent Immunizations   Administered Date(s) Administered    COVID-19, MRNA, LN-S, PF (Pfizer) (Purple Cap) 05/12/2022    Influenza - Quadrivalent - PF *Preferred* (6 months and older) 12/18/2023            Health Maintenance Topics with due status: Not Due       Topic Last Completion Date    Diabetes Urine Screening 12/18/2023    Mammogram 01/11/2024    Lipid Panel 02/20/2024    Low Dose Statin 10/02/2024    RSV Vaccine (Age 60+ and Pregnant patients) Not Due       Future Appointments   Date Time Provider Department Center   1/16/2025 11:40 AM [facility]   4/9/2025 10:00 AM AWV NURSE, [facility] KATHRIN Puentes            Signature:  ROGELIO Pacheco  [facility]     Date of encounter: 10/2/24

## 2024-10-03 DIAGNOSIS — M10.9 GOUT, UNSPECIFIED CAUSE, UNSPECIFIED CHRONICITY, UNSPECIFIED SITE: ICD-10-CM

## 2024-10-03 DIAGNOSIS — M10.9 GOUT, UNSPECIFIED CAUSE, UNSPECIFIED CHRONICITY, UNSPECIFIED SITE: Primary | ICD-10-CM

## 2024-10-03 RX ORDER — ALLOPURINOL 100 MG/1
100 TABLET ORAL DAILY
Qty: 30 TABLET | Refills: 2 | Status: SHIPPED | OUTPATIENT
Start: 2024-10-03 | End: 2024-10-03

## 2024-10-03 RX ORDER — ALLOPURINOL 100 MG/1
100 TABLET ORAL
Qty: 90 TABLET | Refills: 2 | Status: SHIPPED | OUTPATIENT
Start: 2024-10-03

## 2024-10-09 LAB
LEFT EYE DM RETINOPATHY: NEGATIVE
RIGHT EYE DM RETINOPATHY: NEGATIVE

## 2024-11-04 ENCOUNTER — OFFICE VISIT (OUTPATIENT)
Dept: FAMILY MEDICINE | Facility: CLINIC | Age: 59
End: 2024-11-04
Payer: MEDICARE

## 2024-11-04 VITALS
TEMPERATURE: 99 F | BODY MASS INDEX: 35.02 KG/M2 | HEART RATE: 69 BPM | WEIGHT: 190.31 LBS | HEIGHT: 62 IN | RESPIRATION RATE: 20 BRPM | DIASTOLIC BLOOD PRESSURE: 81 MMHG | OXYGEN SATURATION: 95 % | SYSTOLIC BLOOD PRESSURE: 124 MMHG

## 2024-11-04 DIAGNOSIS — R09.81 NASAL CONGESTION: Primary | ICD-10-CM

## 2024-11-04 DIAGNOSIS — Z79.4 TYPE 2 DIABETES MELLITUS WITH OTHER SPECIFIED COMPLICATION, WITH LONG-TERM CURRENT USE OF INSULIN: Chronic | ICD-10-CM

## 2024-11-04 DIAGNOSIS — E11.69 TYPE 2 DIABETES MELLITUS WITH OTHER SPECIFIED COMPLICATION, WITH LONG-TERM CURRENT USE OF INSULIN: Chronic | ICD-10-CM

## 2024-11-04 DIAGNOSIS — J01.01 ACUTE RECURRENT MAXILLARY SINUSITIS: ICD-10-CM

## 2024-11-04 DIAGNOSIS — R05.9 COUGH, UNSPECIFIED TYPE: ICD-10-CM

## 2024-11-04 PROBLEM — E11.9 TYPE 2 DIABETES MELLITUS: Chronic | Status: ACTIVE | Noted: 2023-12-01

## 2024-11-04 LAB
CTP QC/QA: YES
EST. AVERAGE GLUCOSE BLD GHB EST-MCNC: 140 MG/DL
HBA1C MFR BLD HPLC: 6.5 % (ref 4.5–6.6)
MOLECULAR STREP A: NEGATIVE
POC MOLECULAR INFLUENZA A AGN: NEGATIVE
POC MOLECULAR INFLUENZA B AGN: NEGATIVE
SARS-COV-2 RDRP RESP QL NAA+PROBE: NEGATIVE

## 2024-11-04 PROCEDURE — 96372 THER/PROPH/DIAG INJ SC/IM: CPT | Mod: ,,, | Performed by: NURSE PRACTITIONER

## 2024-11-04 PROCEDURE — 99214 OFFICE O/P EST MOD 30 MIN: CPT | Mod: 25,,, | Performed by: NURSE PRACTITIONER

## 2024-11-04 PROCEDURE — 87651 STREP A DNA AMP PROBE: CPT | Mod: RHCUB | Performed by: NURSE PRACTITIONER

## 2024-11-04 PROCEDURE — 1159F MED LIST DOCD IN RCRD: CPT | Mod: ,,, | Performed by: NURSE PRACTITIONER

## 2024-11-04 PROCEDURE — 3074F SYST BP LT 130 MM HG: CPT | Mod: ,,, | Performed by: NURSE PRACTITIONER

## 2024-11-04 PROCEDURE — 83036 HEMOGLOBIN GLYCOSYLATED A1C: CPT | Mod: ,,, | Performed by: CLINICAL MEDICAL LABORATORY

## 2024-11-04 PROCEDURE — 87502 INFLUENZA DNA AMP PROBE: CPT | Mod: RHCUB | Performed by: NURSE PRACTITIONER

## 2024-11-04 PROCEDURE — 3044F HG A1C LEVEL LT 7.0%: CPT | Mod: ,,, | Performed by: NURSE PRACTITIONER

## 2024-11-04 PROCEDURE — 87635 SARS-COV-2 COVID-19 AMP PRB: CPT | Mod: RHCUB | Performed by: NURSE PRACTITIONER

## 2024-11-04 PROCEDURE — 1160F RVW MEDS BY RX/DR IN RCRD: CPT | Mod: ,,, | Performed by: NURSE PRACTITIONER

## 2024-11-04 PROCEDURE — 4010F ACE/ARB THERAPY RXD/TAKEN: CPT | Mod: ,,, | Performed by: NURSE PRACTITIONER

## 2024-11-04 PROCEDURE — 3008F BODY MASS INDEX DOCD: CPT | Mod: ,,, | Performed by: NURSE PRACTITIONER

## 2024-11-04 PROCEDURE — 3079F DIAST BP 80-89 MM HG: CPT | Mod: ,,, | Performed by: NURSE PRACTITIONER

## 2024-11-04 RX ORDER — AMOXICILLIN 500 MG/1
500 TABLET, FILM COATED ORAL EVERY 12 HOURS
Qty: 20 TABLET | Refills: 0 | Status: SHIPPED | OUTPATIENT
Start: 2024-11-04 | End: 2024-11-14

## 2024-11-04 RX ORDER — DEXAMETHASONE SODIUM PHOSPHATE 4 MG/ML
4 INJECTION, SOLUTION INTRA-ARTICULAR; INTRALESIONAL; INTRAMUSCULAR; INTRAVENOUS; SOFT TISSUE
Status: COMPLETED | OUTPATIENT
Start: 2024-11-04 | End: 2024-11-04

## 2024-11-04 RX ADMIN — DEXAMETHASONE SODIUM PHOSPHATE 4 MG: 4 INJECTION, SOLUTION INTRA-ARTICULAR; INTRALESIONAL; INTRAMUSCULAR; INTRAVENOUS; SOFT TISSUE at 09:11

## 2024-11-04 NOTE — PROGRESS NOTES
ROGELIO Pacheco        PATIENT NAME: Jana Lopez  : 1965  DATE: 24  MRN: [id number]      Patient PCP Information       Provider PCP Type    Adri FOFANA ROGELIO [facility]            Reason for Visit / Chief Complaint: Nasal Congestion (Runny nose and drainage), Cough (Wet cough cough up mucus bown yellowish mucus started this morning headache only when cough), and Headache       Update PCP  Update Chief Complaint         History of Present Illness / Problem Focused Workflow     Jana Lopez presents to the clinic with Nasal Congestion (Runny nose and drainage), Cough (Wet cough cough up mucus bown yellowish mucus started this morning headache only when cough), and Headache     Cough  Associated symptoms include headaches.   Headache   Associated symptoms include coughing.     Ms Lopez presents to clinic with sinus pressure and congestion. Hx of sinusitis. Recently switched to Actemra. Immunosuppressed state.     Medical / Social / Family History     Past Medical History:   Diagnosis Date    Anemia     Asthma     Dyslipidemia     Hypertension     HIRA (obstructive sleep apnea)     Personal history of kidney stones     Rheumatoid arthritis     SVT (supraventricular tachycardia)     followed at [facility],     Type 2 diabetes mellitus 2023       Past Surgical History:   Procedure Laterality Date    CHOLECYSTECTOMY      HYSTERECTOMY      LITHOTRIPSY         Social History  Ms.  reports that she has never smoked. She has never been exposed to tobacco smoke. She has never used smokeless tobacco. She reports that she does not currently use alcohol. She reports that she does not use drugs.    Family History  Ms.'s family history includes Diabetes in an other family member; Heart disease in her father and mother; Hypertension in her daughter and father; Stroke in an other family member.    Medications and Allergies     Medications  Outpatient Medications Marked as Taking for the 24  encounter (Office Visit) with Adri Kelly FNP   Medication Sig Dispense Refill    albuterol (PROVENTIL) 2.5 mg /3 mL (0.083 %) nebulizer solution Take 3 mLs (2.5 mg total) by nebulization every 4 (four) hours as needed for Wheezing. 60 mL 1    albuterol (PROVENTIL/VENTOLIN HFA) 90 mcg/actuation inhaler Inhale 2 puffs into the lungs 4 (four) times daily as needed for Wheezing. 18 g 1    allopurinoL (ZYLOPRIM) 100 MG tablet TAKE 1 TABLET BY MOUTH EVERY DAY 90 tablet 2    aspirin (ECOTRIN) 81 MG EC tablet Take 81 mg by mouth once daily.      azelastine (ASTELIN) 137 mcg (0.1 %) nasal spray 1 spray (137 mcg total) by Nasal route 2 (two) times daily. 30 mL 2    blood sugar diagnostic Strp 1 strip by Misc.(Non-Drug; Combo Route) route once daily. 100 each 1    budesonide-formoterol 160-4.5 mcg (SYMBICORT) 160-4.5 mcg/actuation HFAA Inhale 2 puffs into the lungs every 12 (twelve) hours. Controller 10.2 g 11    carvediloL (COREG) 12.5 MG tablet Take 1 tablet (12.5 mg total) by mouth 2 (two) times daily. 60 tablet 11    cetirizine (ZYRTEC) 10 MG tablet TAKE 1 TABLET BY MOUTH EVERY DAY AS NEEDED FOR ALLERGIES 90 tablet 3    chlorthalidone (HYGROTEN) 25 MG Tab Take 1 tablet (25 mg total) by mouth once daily. 30 tablet 11    diclofenac sodium (VOLTAREN) 1 % Gel APPLY 2 GRAMS TOPICALLY THREE TIMES DAILY 100 g 1    ferrous sulfate (IRON) 325 mg (65 mg iron) Tab tablet Take 1 tablet by mouth once daily.      fluorometholone 0.1% (FML) 0.1 % DrpS Apply 1 drop to eye.      fluticasone propionate (FLONASE) 50 mcg/actuation nasal spray 1 spray by Each Nostril route as needed for Allergies.      folic acid (FOLVITE) 1 MG tablet Take 1 tablet (1 mg total) by mouth 2 (two) times daily. 180 tablet 3    gabapentin (NEURONTIN) 100 MG capsule TAKE 1 CAPSULE(100 MG) BY MOUTH THREE TIMES DAILY 90 capsule 0    losartan (COZAAR) 100 MG tablet Take 1 tablet (100 mg total) by mouth once daily. 30 tablet 11    magnesium 250 mg Tab Take 1  "tablet (250 mg total) by mouth once daily. 90 tablet 3    mometasone (NASONEX) 50 mcg/actuation nasal spray 2 sprays by Nasal route once daily. 17 g 2    montelukast (SINGULAIR) 10 mg tablet Take 10 mg by mouth every evening.      NOVOLIN 70-30 FLEXPEN U-100 100 unit/mL (70-30) InPn pen INJECT 27 UNITS UNDER THE SKIN EVERY MORNING AND 20 UNITS EVERY EVENING 12 mL 3    ONETOUCH VERIO FLEX METER [facility] [place] USE TO MONITOR BLOOD GLUCOSE      pen needle, diabetic 32 gauge x 1/4" Ndle 1 Needle by Misc.(Non-Drug; Combo Route) route 2 (two) times a day. 100 each 1    potassium chloride (KLOR-CON) 8 MEQ TbSR Take 1 tablet (8 mEq total) by mouth once daily. 90 tablet 3    REFRESH CELLUVISC 1 % DpGe Place 1 drop into both eyes 4 (four) times daily.      sertraline (ZOLOFT) 25 MG tablet Take 25 mg by mouth once daily.      simvastatin (ZOCOR) 10 MG tablet Take 1 tablet (10 mg total) by mouth every evening. 90 tablet 3    tocilizumab (ACTEMRA) 162 mg/0.9 mL injection Inject 162 mg into the skin every 30 days.      [DISCONTINUED] cephALEXin (KEFLEX) 500 MG capsule Take 500 mg by mouth every 8 (eight) hours.       Current Facility-Administered Medications for the 11/4/24 encounter (Office Visit) with Adri Kelly FNP   Medication Dose Route Frequency Provider Last Rate Last Admin    [COMPLETED] dexAMETHasone injection 4 mg  4 mg Intramuscular 1 time in Clinic/HOD Adri Kelly FNP   4 mg at 11/04/24 0910       Allergies  Review of patient's allergies indicates:   Allergen Reactions    Lisinopril Other (See Comments) and Swelling     cough       Physical Examination     Vitals:    11/04/24 0758   BP: 124/81   BP Location: Right arm   Patient Position: Sitting   Pulse: 69   Resp: 20   Temp: 98.8 °F (37.1 °C)   TempSrc: Oral   SpO2: 95%   Weight: 86.3 kg (190 lb 4.8 oz)   Height: 5' 2" (1.575 m)       Physical Exam  Vitals reviewed.   Constitutional:       Appearance: Normal appearance.   HENT:      Head: Normocephalic.      " Right Ear: External ear normal.      Left Ear: External ear normal.      Nose: Congestion and rhinorrhea present.      Mouth/Throat:      Mouth: Mucous membranes are moist.   Eyes:      Extraocular Movements: Extraocular movements intact.   Cardiovascular:      Rate and Rhythm: Normal rate and regular rhythm.      Pulses: Normal pulses.      Heart sounds: Normal heart sounds.   Pulmonary:      Effort: Pulmonary effort is normal. No respiratory distress.      Breath sounds: Normal breath sounds. No stridor. No wheezing, rhonchi or rales.      Comments: cough  Chest:      Chest wall: No tenderness.   Musculoskeletal:         General: Normal range of motion.      Cervical back: Normal range of motion.   Skin:     General: Skin is warm and dry.   Neurological:      General: No focal deficit present.      Mental Status: She is alert and oriented to person, place, and time.   Psychiatric:         Mood and Affect: Mood normal.         Behavior: Behavior normal.         Thought Content: Thought content normal.         Judgment: Judgment normal.           Office Visit on 11/04/2024   Component Date Value Ref Range Status    POC Rapid COVID 11/04/2024 Negative  Negative Final     Acceptable 11/04/2024 Yes   Final    Molecular Strep A, POC 11/04/2024 Negative  Negative Final     Acceptable 11/04/2024 Yes   Final    POC Molecular Influenza A Ag 11/04/2024 Negative  Negative Final    POC Molecular Influenza B Ag 11/04/2024 Negative  Negative Final     Acceptable 11/04/2024 Yes   Final             Assessment and Plan (including Health Maintenance)      Problem List  Smart Sets  Document Outside HM   :    Plan:     1. Nasal congestion  -     POCT COVID-19 Rapid Screening  -     POCT Strep A, Molecular  -     POCT Influenza A/B Molecular    2. Cough, unspecified type  -     POCT COVID-19 Rapid Screening  -     POCT Strep A, Molecular  -     POCT Influenza A/B Molecular    3. Acute  recurrent maxillary sinusitis  -     amoxicillin (AMOXIL) 500 MG Tab; Take 1 tablet (500 mg total) by mouth every 12 (twelve) hours. for 10 days  Dispense: 20 tablet; Refill: 0  -     dexAMETHasone injection 4 mg    4. Type 2 diabetes mellitus with other specified complication, with long-term current use of insulin  Assessment & Plan:  Continue novolin 70/30 - 27 units every am 20 units every pm  Eye exam requested    Orders:  -     Hemoglobin A1C; Future; Expected date: 11/04/2024    RTC as scheduled     There are no Patient Instructions on file for this visit.       Health Maintenance Due   Topic Date Due    Pneumococcal Vaccines (Age 0-64) (1 of 2 - PCV) Never done    TETANUS VACCINE  Never done    Shingles Vaccine (1 of 2) Never done    Colorectal Cancer Screening  Never done    Eye Exam  03/23/2023    Influenza Vaccine (1) 09/01/2024    COVID-19 Vaccine (4 - 2024-25 season) 09/01/2024    Hemoglobin A1c  10/03/2024    Foot Exam  11/15/2024       Most Recent Immunizations   Administered Date(s) Administered    COVID-19, MRNA, LN-S, PF (Pfizer) (Purple Cap) 05/12/2022    Influenza - Quadrivalent - PF *Preferred* (6 months and older) 12/18/2023            Health Maintenance Topics with due status: Not Due       Topic Last Completion Date    Diabetes Urine Screening 12/18/2023    Mammogram 01/11/2024    Lipid Panel 02/20/2024    Low Dose Statin 11/04/2024    RSV Vaccine (Age 60+ and Pregnant patients) Not Due       Future Appointments   Date Time Provider Department Center   1/16/2025 11:40 AM [facility] [place] [place] [facility] [place] [facility]   4/9/2025 10:00 AM AWV NURSE, [facility]ICINE [facility] KATHRIN Puentes            Signature:  ROGELIO Pacheco  [facility]     Date of encounter: 11/4/24

## 2024-11-08 ENCOUNTER — PATIENT OUTREACH (OUTPATIENT)
Facility: HOSPITAL | Age: 59
End: 2024-11-08
Payer: MEDICARE

## 2024-11-08 NOTE — LETTER
AUTHORIZATION FOR RELEASE OF   CONFIDENTIAL INFORMATION    [facility],    We are seeing Jana Lopez, date of birth 1965, in the clinic at [facility]. Adri Kelly FNP is the patient's PCP. Jana Lopez has an outstanding lab/procedure at the time we reviewed her chart. In order to help keep her health information updated, she has authorized us to request the following medical record(s):        (  )  MAMMOGRAM                                      (  )  COLONOSCOPY      (  )  PAP SMEAR                                          (  )  OUTSIDE LAB RESULTS     (  )  DEXA SCAN                                          ( x )  EYE EXAM            (  )  FOOT EXAM                                          (  )  ENTIRE RECORD     (  )  OUTSIDE IMMUNIZATIONS                 (  )  _______________         Please fax records to Adarsh Art LPN Care Coordinator at [phone].      If you have any questions, please contact Adarsh at [phone].           Patient Name: Jana Lopez  : 1965  Patient Phone #: [phone]                Jana Lopez  MRN: [id number]  : 1965  Age: 59 y.o.  Sex: female         Patient/Legal Guardian Signature  This signature was collected at 2024    Jana Lopez       _______________________________   Printed Name/Relationship to Patient      Consent for Examination and Treatment: I hereby authorize the providers and employees of [facility] ([facility]) to provide medical treatment/services which includes, but is not limited to, performing and administering tests and diagnostic procedures that are deemed necessary, including, but not limited to, imaging examinations, blood tests and other laboratory procedures as may be required by the hospital, clinic, or may be ordered by my physician(s) or persons working under the general and/or special instructions of my physician(s).      I understand and agree that this consent  covers all authorized persons, including but not limited to physicians, residents, nurse practitioners, physicians' assistants, specialists, consultants, student nurses, and independently contracted physicians, who are called upon by the physician in charge, to carry out the diagnostic procedures and medical or surgical treatment.     I hereby authorize [facility] to retain or dispose of any specimens or tissue, should there be such remaining from any test or procedure.     I hereby authorize and give consent for [facility] providers and employees to take photographs, images or videotapes of such diagnostic, surgical or treatment procedures of Patient as may be required by [facility] or as may be ordered by a physician. I further acknowledge and agree that [facility] may use cameras or other devices for patient monitoring.     I am aware that the practice of medicine is not an exact science, and I acknowledge that no guarantees have been made to me as to the outcome of any tests, procedures or treatment.     Authorization for Release of Information: I understand that my insurance company and/or their agents may need information necessary to make determinations about payment/reimbursement. I hereby provide authorization to release to all insurance companies, their successors, assignees, other parties with whom they may have contracted, or others acting on their behalf, that are involved with payment for any hospital and/or clinic charges incurred by the patient, any information that they request and deem necessary for payment/reimbursement, and/or quality review.  I further authorize the release of my health information to physicians or other health care practitioners on staff who are involved in my health care now and in the future, and to other health care providers, entities, or institutions for the purpose of my continued care and treatment, including referrals.     REGISTRATION AUTHORIZATION  Form No. 23417 (Rev. 3/25/2024)     Page 1 of 3                       Medicare Patient's Certification and Authorization to Release Information and Payment Request:  I certify that the information given by me in applying for payment under Title XVIII of the Social Security Act is correct. I authorize any lorenz of medical or other information about me to release to the Social SecurityAdministration, or its intermediaries or carriers, any information needed for this or a related Medicare claim. I request that payment of authorized benefits be made on my behalf.     Assignment of Insurance Benefits:   I hereby authorize any and all insurance companies, health plans, defined   benefit plans, health insurers or any entity that is or may be responsible for payment of my medical expenses to pay all hospital and medical benefits now due, and to become due and payable to me under any hospital benefits, sick benefits, injury benefits or any other benefit for services rendered to me, including Major Medical Benefits, direct to [facility] and all independently contracted physicians. I assign any and all rights that I may have against any and all insurance companies, health plans, defined benefit plans, health insurers or any entity that is or may be responsible for payment of my medical expenses, including, but not limited to any right to appeal a denial of a claim, any right to bring any action, lawsuit, administrative proceeding, or other cause of action on my behalf. I specifically assign my right to pursue litigation against any and all insurance companies, health plans, defined benefit plans, health insurers or any entity that is or may be responsible for payment of my medical expenses based upon a refusal to pay charges.            E. Valuables: It is understood and agreed that [facility] is not liable for the damage to or loss of any money, jewelry,   documents, dentures, eye glasses, hearing aids, prosthetics, or other property of value.     F. Computer  Equipment: I understand and agree that should I choose to use computer equipment owned by [facility] or if I choose to access the Internet via [facility]s network, I do so at my own risk. [facility] is not responsible for any damage to my computer equipment or to any damages of any type that might arise from my loss of equipment or data.     G. Acceptance of Financial Responsibility:  I agree that in consideration of the services and   supplies that have been   or will be furnished to the patient, I am hereby obligated to pay all charges made for or on the account of the patient according to the standard rates (in effect at the time the services and supplies are delivered) established by [facility], including its Patient Financial Assistance Policy to the extent it is applicable. I understand that I am responsible for all charges, or portions thereof, not covered by insurance or other sources. Patient refunds will be distributed only after balances at all [facility] facilities are paid.     H. Communication Authorization:  I hereby authorize [facility] and its representatives, along with any billing service   or  who may work on their behalf, to contact me on   my cell phone and/or home phone using pre- recorded messages, artificial voice messages, automatic telephone dialing devices or other computer assisted technology, or by electronic      mail, text messaging, or by any other form of electronic communication. This includes, but is not limited to, appointment reminders, yearly physical exam reminders, preventive care reminders, patient campaigns, welcome calls, and calls about account balances on my account or any account on which I am listed as a guarantor. I understand I have the right to opt out of these communications at any time.      Relationship  Between  Facility and  Provider:      I understand that some, but not all, providers furnishing services to the patient are not employees or agents of [facility].  The patient is under the care and supervision of his/her attending physician, and it is the responsibility of the facility and its nursing staff to carry out the instructions of such physicians. It is the responsibility of the patient's physician/designee to obtain the patient's informed consent, when required, for medical or surgical treatment, special diagnostic or therapeutic procedures, or hospital services rendered for the patient under the special instructions of the physician/designee.           REGISTRATION AUTHORIZATION  Form No. 07924 (Rev. 3/25/2024)    Page 2 of 3                       Immunizations: [facility] shares immunization information with state sponsored health departments to help you and your doctor keep track of your immunization records. By signing, you consent to have this information shared with the health department in your state:                                [place] - LINKS ([place] Immunization Network for Kids Statewide)                                [place] - MIIX ([place] Immunization Information eXchange)                                [place] - ImmPRINT (Immunization Patient Registry with Integrated Technology)     TERM: This authorization is valid for this and subsequent care/treatment I receive at [facility] and will remain valid unless/until revoked in writing by me.     [facility]: As used in this document, [facility] means all [facility] owned and managed facilities, including, but not limited to, all health centers, surgery centers, clinics, urgent care centers, and hospitals.         [facility] complies with applicable Federal civil rights laws and does not discriminate on the basis of race, color, national origin, age, disability, or sex.  ATENCIÓN: si habla español, tiene a flood disposición servicios gratuitos de asistencia lingüística. Zeferino ochoa [phone].  ZEE Ý: N?u b?n nói Ti?ng Vi?t, có các d?ch v? h? tr? ngôn ng? mi?n phí dành cho  b?n. G?i s? [phone].        REGISTRATION AUTHORIZATION  Form No. 53706 (Rev. 3/25/2024)   Page 3 of 3     Patient

## 2024-11-08 NOTE — LETTER
AUTHORIZATION FOR RELEASE OF   CONFIDENTIAL INFORMATION    Dear [facility],    We are seeing Jana Lopez, date of birth 1965, in the clinic at [facility]. Adri Kelly FNP is the patient's PCP. Jana Lopez has an outstanding lab/procedure at the time we reviewed her chart. In order to help keep her health information updated, she has authorized us to request the following medical record(s):        (  )  MAMMOGRAM                                      (  )  COLONOSCOPY      (  )  PAP SMEAR                                          (  )  OUTSIDE LAB RESULTS     (  )  DEXA SCAN                                          ( x )  EYE EXAM            (  )  FOOT EXAM                                          (  )  ENTIRE RECORD     (  )  OUTSIDE IMMUNIZATIONS                 (  )  _______________         Please fax records to Adarsh Art LPN Care Coordinator at [phone].      If you have any questions, please contact Adarsh at [phone].           Patient Name: Jana Lopez  : 1965  Patient Phone #: [phone]

## 2024-11-08 NOTE — PROGRESS NOTES
Population Health Chart Review & Patient Outreach Details    Updates Requested / Reviewed:  [x]   Reviewed    Health Maintenance Topics Addressed and Outreach Outcomes / Actions Taken:  Diabetic Eye Exam [x] [facility].

## 2024-11-15 ENCOUNTER — PATIENT OUTREACH (OUTPATIENT)
Facility: HOSPITAL | Age: 59
End: 2024-11-15
Payer: MEDICARE

## 2024-11-15 NOTE — PROGRESS NOTES
Population Health Chart Review & Patient Outreach Details    Updates Requested / Reviewed:  [x]  Care Team Updated    Health Maintenance Topics Addressed and Outreach Outcomes / Actions Taken:  Diabetic Eye Exam [x] HM Updated with October 2024 Eye Exam (Dr. Macias). History Updated.

## 2024-11-19 DIAGNOSIS — I47.10 SVT (SUPRAVENTRICULAR TACHYCARDIA): ICD-10-CM

## 2024-11-19 DIAGNOSIS — Z76.0 MEDICATION REFILL: ICD-10-CM

## 2024-11-19 DIAGNOSIS — I10 HYPERTENSION, UNSPECIFIED TYPE: ICD-10-CM

## 2024-11-19 DIAGNOSIS — E78.5 HYPERLIPIDEMIA, UNSPECIFIED HYPERLIPIDEMIA TYPE: ICD-10-CM

## 2024-11-19 RX ORDER — CARVEDILOL 12.5 MG/1
12.5 TABLET ORAL 2 TIMES DAILY
Qty: 60 TABLET | Refills: 11 | Status: SHIPPED | OUTPATIENT
Start: 2024-11-19 | End: 2025-11-14

## 2024-11-19 RX ORDER — SIMVASTATIN 10 MG/1
10 TABLET, FILM COATED ORAL NIGHTLY
Qty: 90 TABLET | Refills: 3 | Status: SHIPPED | OUTPATIENT
Start: 2024-11-19 | End: 2025-11-19

## 2024-11-19 RX ORDER — MAGNESIUM 250 MG
250 TABLET ORAL DAILY
Qty: 90 TABLET | Refills: 3 | Status: SHIPPED | OUTPATIENT
Start: 2024-11-19 | End: 2025-11-14

## 2024-11-19 RX ORDER — LOSARTAN POTASSIUM 100 MG/1
100 TABLET ORAL DAILY
Qty: 30 TABLET | Refills: 11 | Status: SHIPPED | OUTPATIENT
Start: 2024-11-19 | End: 2025-11-14

## 2024-12-06 ENCOUNTER — PATIENT OUTREACH (OUTPATIENT)
Facility: HOSPITAL | Age: 59
End: 2024-12-06
Payer: MEDICARE

## 2024-12-06 NOTE — PROGRESS NOTES
Population Health Chart Review & Patient Outreach Details    Updates Requested / Reviewed:  [x]  Care Team Updated    Health Maintenance Topics Addressed and Outreach Outcomes / Actions Taken:  Diabetic Eye Exam [x] HM Updated with September 2024 Eye Exam (Dr. Torres). History Updated.

## 2024-12-18 DIAGNOSIS — Z79.899 LONG-TERM USE OF HIGH-RISK MEDICATION: ICD-10-CM

## 2024-12-18 DIAGNOSIS — M06.9 RHEUMATOID ARTHRITIS, INVOLVING UNSPECIFIED SITE, UNSPECIFIED WHETHER RHEUMATOID FACTOR PRESENT: Primary | ICD-10-CM

## 2024-12-23 ENCOUNTER — OFFICE VISIT (OUTPATIENT)
Dept: FAMILY MEDICINE | Facility: CLINIC | Age: 59
End: 2024-12-23
Payer: MEDICARE

## 2024-12-23 VITALS
TEMPERATURE: 99 F | OXYGEN SATURATION: 96 % | HEART RATE: 59 BPM | DIASTOLIC BLOOD PRESSURE: 88 MMHG | BODY MASS INDEX: 35.67 KG/M2 | WEIGHT: 195 LBS | SYSTOLIC BLOOD PRESSURE: 144 MMHG

## 2024-12-23 DIAGNOSIS — R05.1 ACUTE COUGH: ICD-10-CM

## 2024-12-23 DIAGNOSIS — Z20.828 EXPOSURE TO VIRAL DISEASE: ICD-10-CM

## 2024-12-23 DIAGNOSIS — J22 LOWER RESPIRATORY TRACT INFECTION: Primary | ICD-10-CM

## 2024-12-23 LAB
CTP QC/QA: YES
CTP QC/QA: YES
POC MOLECULAR INFLUENZA A AGN: NEGATIVE
POC MOLECULAR INFLUENZA B AGN: NEGATIVE
SARS-COV-2 RDRP RESP QL NAA+PROBE: NEGATIVE

## 2024-12-23 PROCEDURE — 3008F BODY MASS INDEX DOCD: CPT | Mod: ,,, | Performed by: NURSE PRACTITIONER

## 2024-12-23 PROCEDURE — 3077F SYST BP >= 140 MM HG: CPT | Mod: ,,, | Performed by: NURSE PRACTITIONER

## 2024-12-23 PROCEDURE — 3079F DIAST BP 80-89 MM HG: CPT | Mod: ,,, | Performed by: NURSE PRACTITIONER

## 2024-12-23 PROCEDURE — 4010F ACE/ARB THERAPY RXD/TAKEN: CPT | Mod: ,,, | Performed by: NURSE PRACTITIONER

## 2024-12-23 PROCEDURE — 87502 INFLUENZA DNA AMP PROBE: CPT | Mod: RHCUB | Performed by: NURSE PRACTITIONER

## 2024-12-23 PROCEDURE — 87635 SARS-COV-2 COVID-19 AMP PRB: CPT | Mod: RHCUB | Performed by: NURSE PRACTITIONER

## 2024-12-23 PROCEDURE — 3044F HG A1C LEVEL LT 7.0%: CPT | Mod: ,,, | Performed by: NURSE PRACTITIONER

## 2024-12-23 PROCEDURE — 2023F DILAT RTA XM W/O RTNOPTHY: CPT | Mod: ,,, | Performed by: NURSE PRACTITIONER

## 2024-12-23 PROCEDURE — 1159F MED LIST DOCD IN RCRD: CPT | Mod: ,,, | Performed by: NURSE PRACTITIONER

## 2024-12-23 PROCEDURE — 99213 OFFICE O/P EST LOW 20 MIN: CPT | Mod: ,,, | Performed by: NURSE PRACTITIONER

## 2024-12-23 RX ORDER — TRIPROLIDINE HCL, DEXTROMETHORPHAN HBR 10; 1.25 MG/5ML; MG/5ML
10 LIQUID ORAL EVERY 6 HOURS PRN
Qty: 150 ML | Refills: 0 | Status: SHIPPED | OUTPATIENT
Start: 2024-12-23

## 2024-12-23 RX ORDER — AZITHROMYCIN 250 MG/1
TABLET, FILM COATED ORAL
Qty: 6 TABLET | Refills: 0 | Status: SHIPPED | OUTPATIENT
Start: 2024-12-23 | End: 2024-12-28

## 2024-12-23 NOTE — PROGRESS NOTES
Subjective:       Patient ID: Jana Lopez is a 59 y.o. female.    Chief Complaint: Cough (3-4 days) and Nasal Congestion    Cough (3-4 days) and Nasal Congestion  Pt has been taking OTC cough and cold medications    Cough  Pertinent negatives include no chest pain, ear pain, fever, headaches, rash, sore throat or shortness of breath.     Review of Systems   Constitutional:  Negative for appetite change, fatigue and fever.   HENT:  Positive for nasal congestion. Negative for ear pain and sore throat.    Eyes:  Negative for pain, discharge and itching.   Respiratory:  Positive for cough. Negative for shortness of breath.    Cardiovascular:  Negative for chest pain and leg swelling.   Gastrointestinal:  Negative for abdominal pain, change in bowel habit, nausea and vomiting.   Musculoskeletal:  Negative for back pain, gait problem and neck pain.   Integumentary:  Negative for rash and wound.   Allergic/Immunologic: Negative for immunocompromised state.   Neurological:  Negative for dizziness, weakness and headaches.   All other systems reviewed and are negative.        Objective:      Physical Exam  Vitals and nursing note reviewed.   Constitutional:       General: She is not in acute distress.     Appearance: Normal appearance. She is not ill-appearing, toxic-appearing or diaphoretic.   HENT:      Head: Normocephalic.      Right Ear: Tympanic membrane, ear canal and external ear normal.      Left Ear: Tympanic membrane, ear canal and external ear normal.      Nose: Congestion present. No rhinorrhea.      Mouth/Throat:      Mouth: Mucous membranes are moist.      Pharynx: Posterior oropharyngeal erythema present. No oropharyngeal exudate.   Eyes:      General: No scleral icterus.        Right eye: No discharge.         Left eye: No discharge.      Extraocular Movements: Extraocular movements intact.      Conjunctiva/sclera: Conjunctivae normal.      Pupils: Pupils are equal, round, and reactive to light.    Cardiovascular:      Rate and Rhythm: Normal rate and regular rhythm.      Pulses: Normal pulses.      Heart sounds: Normal heart sounds. No murmur heard.  Pulmonary:      Effort: Pulmonary effort is normal. No respiratory distress.      Breath sounds: Wheezing present. No rhonchi or rales.   Musculoskeletal:         General: Normal range of motion.      Cervical back: Neck supple. No tenderness.   Lymphadenopathy:      Cervical: No cervical adenopathy.   Skin:     General: Skin is warm and dry.      Capillary Refill: Capillary refill takes less than 2 seconds.      Findings: No rash.   Neurological:      Mental Status: She is alert and oriented to person, place, and time.   Psychiatric:         Mood and Affect: Mood normal.         Behavior: Behavior normal.         Thought Content: Thought content normal.         Judgment: Judgment normal.            Assessment:       1. Lower respiratory tract infection    2. Exposure to viral disease    3. Acute cough        Plan:   Lower respiratory tract infection  -     azithromycin (Z-RADHA) 250 MG tablet; Take 2 tablets by mouth on day 1; Take 1 tablet by mouth on days 2-5  Dispense: 6 tablet; Refill: 0    Exposure to viral disease  -     POCT COVID-19 Rapid Screening  -     POCT Influenza A/B Molecular    Acute cough  -     dextromethorphan-triprolidine (ENDAL, DM-TRIPROLIDINE,) 10-1.25 mg/5 mL Soln; Take 10 mLs by mouth every 6 (six) hours as needed (cough).  Dispense: 150 mL; Refill: 0           Risks, benefits, and side effects were discussed with the patient. All questions were answered to the fullest satisfaction of the patient, and pt verbalized understanding and agreement to treatment plan. Pt was to call with any new or worsening symptoms, or present to the ER

## 2025-01-08 DIAGNOSIS — I10 HYPERTENSION, UNSPECIFIED TYPE: Chronic | ICD-10-CM

## 2025-01-08 DIAGNOSIS — E11.69 TYPE 2 DIABETES MELLITUS WITH OTHER SPECIFIED COMPLICATION, WITH LONG-TERM CURRENT USE OF INSULIN: Chronic | ICD-10-CM

## 2025-01-08 DIAGNOSIS — Z79.4 TYPE 2 DIABETES MELLITUS WITH OTHER SPECIFIED COMPLICATION, WITH LONG-TERM CURRENT USE OF INSULIN: Chronic | ICD-10-CM

## 2025-01-09 RX ORDER — CHLORTHALIDONE 25 MG/1
25 TABLET ORAL DAILY
Qty: 30 TABLET | Refills: 11 | Status: SHIPPED | OUTPATIENT
Start: 2025-01-09 | End: 2026-01-04

## 2025-01-09 RX ORDER — HUMAN INSULIN 100 [IU]/ML
INJECTION, SUSPENSION SUBCUTANEOUS
Qty: 12 ML | Refills: 3 | Status: SHIPPED | OUTPATIENT
Start: 2025-01-09

## 2025-01-16 ENCOUNTER — HOSPITAL ENCOUNTER (OUTPATIENT)
Dept: RADIOLOGY | Facility: HOSPITAL | Age: 60
Discharge: HOME | End: 2025-01-16
Attending: RADIOLOGY
Payer: MEDICARE

## 2025-01-16 VITALS — HEIGHT: 62 IN | WEIGHT: 185 LBS | BODY MASS INDEX: 34.04 KG/M2

## 2025-01-16 DIAGNOSIS — Z12.31 OTHER SCREENING MAMMOGRAM: ICD-10-CM

## 2025-01-16 PROCEDURE — 77067 SCR MAMMO BI INCL CAD: CPT | Mod: TC

## 2025-03-04 ENCOUNTER — OFFICE VISIT (OUTPATIENT)
Dept: FAMILY MEDICINE | Facility: CLINIC | Age: 60
End: 2025-03-04
Payer: MEDICARE

## 2025-03-04 VITALS
OXYGEN SATURATION: 96 % | WEIGHT: 184 LBS | HEART RATE: 72 BPM | BODY MASS INDEX: 33.86 KG/M2 | RESPIRATION RATE: 18 BRPM | DIASTOLIC BLOOD PRESSURE: 80 MMHG | HEIGHT: 62 IN | SYSTOLIC BLOOD PRESSURE: 144 MMHG | TEMPERATURE: 99 F

## 2025-03-04 DIAGNOSIS — B34.9 VIRAL ILLNESS: Primary | ICD-10-CM

## 2025-03-04 DIAGNOSIS — I10 HYPERTENSION, UNSPECIFIED TYPE: ICD-10-CM

## 2025-03-04 DIAGNOSIS — Z79.4 TYPE 2 DIABETES MELLITUS WITH OTHER SPECIFIED COMPLICATION, WITH LONG-TERM CURRENT USE OF INSULIN: ICD-10-CM

## 2025-03-04 DIAGNOSIS — E11.69 TYPE 2 DIABETES MELLITUS WITH OTHER SPECIFIED COMPLICATION, WITH LONG-TERM CURRENT USE OF INSULIN: ICD-10-CM

## 2025-03-04 DIAGNOSIS — R05.9 COUGH, UNSPECIFIED TYPE: ICD-10-CM

## 2025-03-04 PROBLEM — J22 LOWER RESPIRATORY TRACT INFECTION: Status: RESOLVED | Noted: 2024-12-23 | Resolved: 2025-03-04

## 2025-03-04 PROBLEM — Z20.828 EXPOSURE TO VIRAL DISEASE: Status: RESOLVED | Noted: 2024-12-23 | Resolved: 2025-03-04

## 2025-03-04 PROBLEM — R05.1 ACUTE COUGH: Status: RESOLVED | Noted: 2024-12-23 | Resolved: 2025-03-04

## 2025-03-04 PROCEDURE — 3079F DIAST BP 80-89 MM HG: CPT | Mod: ,,, | Performed by: NURSE PRACTITIONER

## 2025-03-04 PROCEDURE — 1159F MED LIST DOCD IN RCRD: CPT | Mod: ,,, | Performed by: NURSE PRACTITIONER

## 2025-03-04 PROCEDURE — 3077F SYST BP >= 140 MM HG: CPT | Mod: ,,, | Performed by: NURSE PRACTITIONER

## 2025-03-04 PROCEDURE — 1160F RVW MEDS BY RX/DR IN RCRD: CPT | Mod: ,,, | Performed by: NURSE PRACTITIONER

## 2025-03-04 PROCEDURE — 99213 OFFICE O/P EST LOW 20 MIN: CPT | Mod: ,,, | Performed by: NURSE PRACTITIONER

## 2025-03-04 PROCEDURE — 3008F BODY MASS INDEX DOCD: CPT | Mod: ,,, | Performed by: NURSE PRACTITIONER

## 2025-03-04 RX ORDER — PROMETHAZINE HYDROCHLORIDE AND DEXTROMETHORPHAN HYDROBROMIDE 6.25; 15 MG/5ML; MG/5ML
5 SYRUP ORAL EVERY 4 HOURS PRN
Qty: 473 ML | Refills: 0 | Status: SHIPPED | OUTPATIENT
Start: 2025-03-04

## 2025-03-04 NOTE — PROGRESS NOTES
Subjective     Patient ID: Jana Lopez is a 59 y.o. female.    Chief Complaint: Cough and Nasal Congestion (Coughing up and blowing out thick yellow mucous.feeling bad since Sunday.)    Pt presents with cough and congestion x 3 days.      Review of Systems   Constitutional:  Negative for activity change, appetite change, chills, fatigue and fever.   HENT:  Positive for nasal congestion and rhinorrhea. Negative for ear discharge, nosebleeds, postnasal drip, sinus pressure/congestion, sneezing, sore throat and tinnitus.    Eyes:  Negative for pain, discharge, redness and itching.   Respiratory:  Positive for cough. Negative for choking, chest tightness, shortness of breath and wheezing.    Cardiovascular:  Negative for chest pain.   Gastrointestinal:  Negative for abdominal distention, abdominal pain, blood in stool, change in bowel habit, constipation, diarrhea, nausea and vomiting.   Genitourinary:  Negative for decreased urine volume, dysuria, flank pain and frequency.   Musculoskeletal:  Negative for back pain and gait problem.   Integumentary:  Negative for wound, breast mass and breast discharge.   Allergic/Immunologic: Negative for immunocompromised state.   Neurological:  Negative for dizziness, light-headedness and headaches.   Psychiatric/Behavioral:  Negative for agitation, behavioral problems and hallucinations.    Breast: Negative for mass         Objective     Physical Exam  Vitals and nursing note reviewed.   Constitutional:       Appearance: Normal appearance.   HENT:      Head: Normocephalic.      Right Ear: Tympanic membrane normal.      Left Ear: Tympanic membrane normal.      Nose: Nose normal.      Mouth/Throat:      Mouth: Mucous membranes are moist.   Eyes:      Conjunctiva/sclera: Conjunctivae normal.   Cardiovascular:      Rate and Rhythm: Normal rate and regular rhythm.      Heart sounds: Normal heart sounds.   Pulmonary:      Effort: Pulmonary effort is normal.      Breath sounds:  Normal breath sounds.   Musculoskeletal:         General: Normal range of motion.   Neurological:      Mental Status: She is alert and oriented to person, place, and time.   Psychiatric:         Mood and Affect: Mood normal.         Behavior: Behavior normal.            Assessment and Plan     1. Viral illness    2. Type 2 diabetes mellitus with other specified complication, with long-term current use of insulin    3. Cough, unspecified type  -     promethazine-dextromethorphan (PROMETHAZINE-DM) 6.25-15 mg/5 mL Syrp; Take 5 mLs by mouth every 4 (four) hours as needed (cough). Do not take while driving  Dispense: 473 mL; Refill: 0    4. Hypertension  Pt reports uncontrolled due to OTC cough meds she has been taking     Follow-up with PCP for diabetes and hypertension.          Follow up if symptoms worsen or fail to improve.

## 2025-03-10 ENCOUNTER — OFFICE VISIT (OUTPATIENT)
Dept: FAMILY MEDICINE | Facility: CLINIC | Age: 60
End: 2025-03-10
Payer: MEDICARE

## 2025-03-10 VITALS
HEART RATE: 70 BPM | RESPIRATION RATE: 20 BRPM | SYSTOLIC BLOOD PRESSURE: 124 MMHG | BODY MASS INDEX: 33.68 KG/M2 | HEIGHT: 62 IN | TEMPERATURE: 98 F | OXYGEN SATURATION: 95 % | WEIGHT: 183 LBS | DIASTOLIC BLOOD PRESSURE: 85 MMHG

## 2025-03-10 DIAGNOSIS — E11.69 TYPE 2 DIABETES MELLITUS WITH OTHER SPECIFIED COMPLICATION, WITH LONG-TERM CURRENT USE OF INSULIN: Primary | ICD-10-CM

## 2025-03-10 DIAGNOSIS — E66.811 CLASS 1 OBESITY WITH SERIOUS COMORBIDITY AND BODY MASS INDEX (BMI) OF 33.0 TO 33.9 IN ADULT, UNSPECIFIED OBESITY TYPE: ICD-10-CM

## 2025-03-10 DIAGNOSIS — Z79.4 TYPE 2 DIABETES MELLITUS WITH OTHER SPECIFIED COMPLICATION, WITH LONG-TERM CURRENT USE OF INSULIN: Primary | ICD-10-CM

## 2025-03-10 DIAGNOSIS — Z12.11 SCREENING FOR COLON CANCER: ICD-10-CM

## 2025-03-10 DIAGNOSIS — E78.5 DYSLIPIDEMIA: ICD-10-CM

## 2025-03-10 DIAGNOSIS — J32.0 MAXILLARY SINUSITIS, UNSPECIFIED CHRONICITY: ICD-10-CM

## 2025-03-10 DIAGNOSIS — R09.81 NASAL CONGESTION: ICD-10-CM

## 2025-03-10 DIAGNOSIS — I10 ESSENTIAL (PRIMARY) HYPERTENSION: ICD-10-CM

## 2025-03-10 DIAGNOSIS — G44.83 COUGH HEADACHE: ICD-10-CM

## 2025-03-10 LAB
ALBUMIN SERPL BCP-MCNC: 3.6 G/DL (ref 3.5–5)
ALBUMIN/GLOB SERPL: 1.2 {RATIO}
ALP SERPL-CCNC: 60 U/L (ref 40–150)
ALT SERPL W P-5'-P-CCNC: 44 U/L
ANION GAP SERPL CALCULATED.3IONS-SCNC: 13 MMOL/L (ref 7–16)
AST SERPL W P-5'-P-CCNC: 34 U/L (ref 11–45)
BILIRUB SERPL-MCNC: 0.7 MG/DL
BILIRUB SERPL-MCNC: NEGATIVE MG/DL
BLOOD URINE, POC: NEGATIVE
BUN SERPL-MCNC: 26 MG/DL (ref 10–20)
BUN/CREAT SERPL: 24 (ref 6–20)
CALCIUM SERPL-MCNC: 8.9 MG/DL (ref 8.4–10.2)
CHLORIDE SERPL-SCNC: 106 MMOL/L (ref 98–107)
CHOLEST SERPL-MCNC: 163 MG/DL
CHOLEST/HDLC SERPL: 2.8 {RATIO}
CLARITY, UA: CLEAR
CO2 SERPL-SCNC: 26 MMOL/L (ref 22–29)
COLOR, UA: YELLOW
CREAT SERPL-MCNC: 1.1 MG/DL (ref 0.55–1.02)
CREAT UR-MCNC: 91 MG/DL (ref 15–325)
CTP QC/QA: YES
CTP QC/QA: YES
EGFR (NO RACE VARIABLE) (RUSH/TITUS): 58 ML/MIN/1.73M2
EST. AVERAGE GLUCOSE BLD GHB EST-MCNC: 126 MG/DL
GLOBULIN SER-MCNC: 3.1 G/DL (ref 2–4)
GLUCOSE SERPL-MCNC: 89 MG/DL (ref 74–100)
GLUCOSE UR QL STRIP: NEGATIVE
HBA1C MFR BLD HPLC: 6 %
HDLC SERPL-MCNC: 58 MG/DL (ref 35–60)
KETONES UR QL STRIP: NEGATIVE
LDLC SERPL CALC-MCNC: 96 MG/DL
LDLC/HDLC SERPL: 1.7 {RATIO}
LEUKOCYTE ESTERASE URINE, POC: NEGATIVE
MICROALBUMIN UR-MCNC: <0.5 MG/DL
MICROALBUMIN/CREAT RATIO PNL UR: NORMAL
NITRITE, POC UA: NEGATIVE
NONHDLC SERPL-MCNC: 105 MG/DL
PH, POC UA: 7
POC MOLECULAR INFLUENZA A AGN: NEGATIVE
POC MOLECULAR INFLUENZA B AGN: NEGATIVE
POTASSIUM SERPL-SCNC: 3.6 MMOL/L (ref 3.5–5.1)
PROT SERPL-MCNC: 6.7 G/DL (ref 6.4–8.3)
PROTEIN, POC: NEGATIVE
SARS-COV-2 RDRP RESP QL NAA+PROBE: NEGATIVE
SODIUM SERPL-SCNC: 141 MMOL/L (ref 136–145)
SPECIFIC GRAVITY, POC UA: 1.01
TRIGL SERPL-MCNC: 47 MG/DL (ref 37–140)
UROBILINOGEN, POC UA: 0.2
VLDLC SERPL-MCNC: 9 MG/DL

## 2025-03-10 PROCEDURE — 87635 SARS-COV-2 COVID-19 AMP PRB: CPT | Mod: RHCUB

## 2025-03-10 PROCEDURE — 1159F MED LIST DOCD IN RCRD: CPT | Mod: ,,,

## 2025-03-10 PROCEDURE — 3074F SYST BP LT 130 MM HG: CPT | Mod: ,,,

## 2025-03-10 PROCEDURE — 99214 OFFICE O/P EST MOD 30 MIN: CPT | Mod: 25,,,

## 2025-03-10 PROCEDURE — 3008F BODY MASS INDEX DOCD: CPT | Mod: ,,,

## 2025-03-10 PROCEDURE — 87502 INFLUENZA DNA AMP PROBE: CPT | Mod: RHCUB

## 2025-03-10 PROCEDURE — 80053 COMPREHEN METABOLIC PANEL: CPT | Mod: ,,, | Performed by: CLINICAL MEDICAL LABORATORY

## 2025-03-10 PROCEDURE — 82570 ASSAY OF URINE CREATININE: CPT | Mod: ,,, | Performed by: CLINICAL MEDICAL LABORATORY

## 2025-03-10 PROCEDURE — 96372 THER/PROPH/DIAG INJ SC/IM: CPT | Mod: ,,,

## 2025-03-10 PROCEDURE — 82043 UR ALBUMIN QUANTITATIVE: CPT | Mod: ,,, | Performed by: CLINICAL MEDICAL LABORATORY

## 2025-03-10 PROCEDURE — 1160F RVW MEDS BY RX/DR IN RCRD: CPT | Mod: ,,,

## 2025-03-10 PROCEDURE — 80061 LIPID PANEL: CPT | Mod: ,,, | Performed by: CLINICAL MEDICAL LABORATORY

## 2025-03-10 PROCEDURE — 83036 HEMOGLOBIN GLYCOSYLATED A1C: CPT | Mod: ,,, | Performed by: CLINICAL MEDICAL LABORATORY

## 2025-03-10 PROCEDURE — 3079F DIAST BP 80-89 MM HG: CPT | Mod: ,,,

## 2025-03-10 RX ORDER — AZITHROMYCIN 250 MG/1
TABLET, FILM COATED ORAL
Qty: 6 TABLET | Refills: 0 | Status: SHIPPED | OUTPATIENT
Start: 2025-03-10

## 2025-03-10 RX ORDER — CEFTRIAXONE 1 G/1
1 INJECTION, POWDER, FOR SOLUTION INTRAMUSCULAR; INTRAVENOUS
Status: COMPLETED | OUTPATIENT
Start: 2025-03-10 | End: 2025-03-10

## 2025-03-10 RX ORDER — DEXAMETHASONE SODIUM PHOSPHATE 4 MG/ML
4 INJECTION, SOLUTION INTRA-ARTICULAR; INTRALESIONAL; INTRAMUSCULAR; INTRAVENOUS; SOFT TISSUE
Status: COMPLETED | OUTPATIENT
Start: 2025-03-10 | End: 2025-03-10

## 2025-03-10 RX ADMIN — DEXAMETHASONE SODIUM PHOSPHATE 4 MG: 4 INJECTION, SOLUTION INTRA-ARTICULAR; INTRALESIONAL; INTRAMUSCULAR; INTRAVENOUS; SOFT TISSUE at 08:03

## 2025-03-10 RX ADMIN — CEFTRIAXONE 1 G: 1 INJECTION, POWDER, FOR SOLUTION INTRAMUSCULAR; INTRAVENOUS at 08:03

## 2025-03-10 NOTE — PATIENT INSTRUCTIONS
Tests today negative  Medications administered in clinic  Medication sent to to pharmacy  Continue  Promethazine DM  Return to clinic if symptoms worsen and as needed.     Patient advised to continue DASH diet and daily BP checks.   Continue low cholesterol/fat, low sugar/carbohydrate diet  Continue current medications  Labs today. Foot exam completed  Colonoscopy ordered.

## 2025-03-10 NOTE — PROGRESS NOTES
China Alvarez NP   [address] [place] [address]     PATIENT NAME: Jana Lopez  : 1965  DATE: 3/10/25  MRN: [id number]      Billing Provider: China Alvarez NP  Level of Service:   Patient PCP Information       None on File            Reason for Visit / Chief Complaint: Sinus Problem (Pt presents to the clinic for sinus issues for a week and a day ), Nasal Congestion, Headache, and Cough       Update PCP  Update Chief Complaint         History of Present Illness / Problem Focused Workflow     Jana Lopez presents to the clinic with Sinus Problem (Pt presents to the clinic for sinus issues for a week and a day ), Nasal Congestion, Headache, and Cough     Patient here here today with complaints of cough, nasal congestion, headache, and sinus pressure for the past week. She reports nasal drainage is yellow. Denies fever, body aches, or chills. She was seen at urgent care last week and given PROMETHAZINE-DM  which has not helped her symptoms. Afebrile today. Tests today negative. Will treat for sinusitis. Medications administered in clinic. Medication sent to to pharmacy. Continue  Promethazine DM. Return to clinic if symptoms worsen and as needed.       BP at goal today. Patient advised to continue DASH diet and daily BP checks. She reports good blood sugar readings at home. Last A1C at goal. Continue low cholesterol/fat, low sugar/carbohydrate diet. Continue current medications. Labs today. Foot exam completed. Colonoscopy ordered.     Sinus Problem  Associated symptoms include congestion, coughing, headaches and sinus pressure. Pertinent negatives include no chills, ear pain, shortness of breath or sore throat.   Headache   Associated symptoms include coughing, rhinorrhea and sinus pressure. Pertinent negatives include no abdominal pain, dizziness, ear pain, fever, nausea, sore throat or vomiting.   Cough  Associated symptoms include headaches, postnasal drip and  rhinorrhea. Pertinent negatives include no chills, ear pain, fever, sore throat, shortness of breath or wheezing.       Review of Systems     Review of Systems   Constitutional: Negative.  Negative for chills and fever.   HENT:  Positive for nasal congestion, postnasal drip, rhinorrhea and sinus pressure/congestion. Negative for ear pain and sore throat.    Eyes: Negative.    Respiratory:  Positive for cough. Negative for shortness of breath and wheezing.    Cardiovascular: Negative.    Gastrointestinal: Negative.  Negative for abdominal pain, nausea and vomiting.   Endocrine: Negative.    Genitourinary: Negative.    Musculoskeletal: Negative.    Integumentary:  Negative.   Allergic/Immunologic: Negative.    Neurological:  Positive for headaches. Negative for dizziness.   Psychiatric/Behavioral: Negative.          Medical / Social / Family History     Past Medical History:   Diagnosis Date    Acute cough 12/23/2024    Anemia     Asthma     Diabetic eye exam 10/09/2024    Dr. [facility]    Diabetic eye exam 09/09/2024    Dr. Rod[facility]guez [facility]    Dyslipidemia     Exposure to viral disease 12/23/2024    Hypertension     Lower respiratory tract infection 12/23/2024    HIRA (obstructive sleep apnea)     Personal history of kidney stones     Rheumatoid arthritis     SVT (supraventricular tachycardia)     followed at [facility],     Type 2 diabetes mellitus 12/01/2023       Past Surgical History:   Procedure Laterality Date    CHOLECYSTECTOMY      HYSTERECTOMY      LITHOTRIPSY         Social History  Ms.  reports that she has never smoked. She has never been exposed to tobacco smoke. She has never used smokeless tobacco. She reports that she does not currently use alcohol. She reports that she does not use drugs.    Family History  Ms.'s family history includes Diabetes in an other family member; Heart disease in her father and mother; Hypertension in her daughter and father; Stroke in an  other family member.    Medications and Allergies     Medications  Outpatient Medications Marked as Taking for the 3/10/25 encounter (Office Visit) with China Alvarez NP   Medication Sig Dispense Refill    albuterol (PROVENTIL) 2.5 mg /3 mL (0.083 %) nebulizer solution Take 3 mLs (2.5 mg total) by nebulization every 4 (four) hours as needed for Wheezing. 60 mL 1    albuterol (PROVENTIL/VENTOLIN HFA) 90 mcg/actuation inhaler Inhale 2 puffs into the lungs 4 (four) times daily as needed for Wheezing. 18 g 1    allopurinoL (ZYLOPRIM) 100 MG tablet TAKE 1 TABLET BY MOUTH EVERY DAY 90 tablet 2    aspirin (ECOTRIN) 81 MG EC tablet Take 81 mg by mouth once daily.      azelastine (ASTELIN) 137 mcg (0.1 %) nasal spray 1 spray (137 mcg total) by Nasal route 2 (two) times daily. 30 mL 2    blood sugar diagnostic Strp 1 strip by Misc.(Non-Drug; Combo Route) route once daily. 100 each 1    budesonide-formoterol 160-4.5 mcg (SYMBICORT) 160-4.5 mcg/actuation HFAA Inhale 2 puffs into the lungs every 12 (twelve) hours. Controller 10.2 g 11    carvediloL (COREG) 12.5 MG tablet Take 1 tablet (12.5 mg total) by mouth 2 (two) times daily. 60 tablet 11    chlorthalidone (HYGROTEN) 25 MG Tab Take 1 tablet (25 mg total) by mouth once daily. 30 tablet 11    diclofenac sodium (VOLTAREN) 1 % Gel APPLY 2 GRAMS TOPICALLY THREE TIMES DAILY 100 g 1    ferrous sulfate (IRON) 325 mg (65 mg iron) Tab tablet Take 1 tablet by mouth once daily.      fluorometholone 0.1% (FML) 0.1 % DrpS Place 1 drop into both eyes Daily.      fluticasone propionate (FLONASE) 50 mcg/actuation nasal spray 1 spray by Each Nostril route as needed for Allergies.      folic acid (FOLVITE) 1 MG tablet Take 1 tablet (1 mg total) by mouth 2 (two) times daily. 180 tablet 3    gabapentin (NEURONTIN) 100 MG capsule TAKE 1 CAPSULE(100 MG) BY MOUTH THREE TIMES DAILY 90 capsule 0    losartan (COZAAR) 100 MG tablet Take 1 tablet (100 mg total) by mouth once daily. 30 tablet 11     "magnesium 250 mg Tab Take 1 tablet (250 mg total) by mouth once daily. 90 tablet 3    mometasone (NASONEX) 50 mcg/actuation nasal spray 2 sprays by Nasal route once daily. 17 g 2    NOVOLIN 70-30 FLEXPEN U-100 100 unit/mL (70-30) InPn pen INJECT 27 UNITS UNDER THE SKIN EVERY MORNING AND 20 UNITS EVERY EVENING 12 mL 3    ONETOUCH VERIO FLEX METER Misc USE TO MONITOR BLOOD GLUCOSE      pen needle, diabetic 32 gauge x 1/4" Ndle 1 Needle by Misc.(Non-Drug; Combo Route) route 2 (two) times a day. 100 each 1    promethazine-dextromethorphan (PROMETHAZINE-DM) 6.25-15 mg/5 mL Syrp Take 5 mLs by mouth every 4 (four) hours as needed (cough). Do not take while driving 473 mL 0    REFRESH CELLUVISC 1 % DpGe Place 1 drop into both eyes 4 (four) times daily.      simvastatin (ZOCOR) 10 MG tablet Take 1 tablet (10 mg total) by mouth every evening. 90 tablet 3    tocilizumab (ACTEMRA) 162 mg/0.9 mL injection Inject 162 mg into the skin every 30 days.         Allergies  Review of patient's allergies indicates:   Allergen Reactions    Lisinopril Other (See Comments) and Swelling     cough       Physical Examination   There were no vitals taken for this visit.   Physical Exam  Vitals reviewed.   Constitutional:       Appearance: Normal appearance. She is obese.   HENT:      Right Ear: Tympanic membrane, ear canal and external ear normal.      Left Ear: Tympanic membrane, ear canal and external ear normal.      Nose: Congestion and rhinorrhea present. Rhinorrhea is purulent.      Right Sinus: Maxillary sinus tenderness present.      Left Sinus: Maxillary sinus tenderness present.      Mouth/Throat:      Pharynx: Pharyngeal swelling and posterior oropharyngeal erythema present. No oropharyngeal exudate.   Eyes:      Extraocular Movements: Extraocular movements intact.      Conjunctiva/sclera: Conjunctivae normal.      Pupils: Pupils are equal, round, and reactive to light.   Cardiovascular:      Rate and Rhythm: Normal rate and regular " rhythm.      Pulses: Normal pulses.           Dorsalis pedis pulses are 2+ on the right side and 2+ on the left side.        Posterior tibial pulses are 2+ on the right side and 2+ on the left side.      Heart sounds: Normal heart sounds.   Pulmonary:      Effort: Pulmonary effort is normal. No respiratory distress.      Breath sounds: Normal breath sounds. No wheezing or rales.   Abdominal:      General: Bowel sounds are normal.      Palpations: Abdomen is soft.      Tenderness: There is no abdominal tenderness.   Musculoskeletal:         General: Normal range of motion.      Cervical back: Normal range of motion and neck supple.   Feet:      Right foot:      Protective Sensation: 10 sites tested.  10 sites sensed.      Skin integrity: No ulcer, blister or skin breakdown.      Toenail Condition: Right toenails are abnormally thick, long and ingrown.      Left foot:      Protective Sensation: 10 sites tested.  10 sites sensed.      Skin integrity: No ulcer, blister or skin breakdown.      Toenail Condition: Left toenails are abnormally thick, long and ingrown.      Comments: Protective Sensation (w/ 10 gram monofilament):  Lymphadenopathy:      Cervical: Cervical adenopathy present.   Skin:     General: Skin is warm and dry.      Capillary Refill: Capillary refill takes less than 2 seconds.   Neurological:      General: No focal deficit present.      Mental Status: She is alert and oriented to person, place, and time.   Psychiatric:         Mood and Affect: Mood normal.         Behavior: Behavior normal.        Assessment and Plan (including Health Maintenance)      Problem List  Smart Sets  Document Outside HM   :    Plan:   Tests today negative  Medications administered in clinic  Medication sent to to pharmacy  Continue  Promethazine DM  Return to clinic if symptoms worsen and as needed.     Patient advised to continue DASH diet and daily BP checks.   Continue low cholesterol/fat, low sugar/carbohydrate  diet  Continue current medications  Labs today. Foot exam completed  Colonoscopy ordered.         Health Maintenance Due   Topic Date Due    TETANUS VACCINE  Never done    Shingles Vaccine (1 of 2) Never done    Pneumococcal Vaccines (Age 50+) (1 of 2 - PCV) Never done    Colorectal Cancer Screening  Never done    COVID-19 Vaccine (4 - 2024-25 season) 09/01/2024    Foot Exam  11/15/2024    Diabetes Urine Screening  12/18/2024    Lipid Panel  02/20/2025       Problem List Items Addressed This Visit       Type 2 diabetes mellitus - Primary (Chronic)    Relevant Orders    POCT URINALYSIS W/O SCOPE    Lipid Panel    Microalbumin/Creatinine Ratio, Urine     Other Visit Diagnoses         Cough headache        Relevant Orders    POCT COVID-19 Rapid Screening    POCT Influenza A/B Molecular            Health Maintenance Topics with due status: Not Due       Topic Last Completion Date    Diabetic Eye Exam 10/09/2024    Hemoglobin A1c 11/04/2024    Mammogram 01/16/2025    Low Dose Statin 03/10/2025    RSV Vaccine (Age 60+ and Pregnant patients) Not Due       Future Appointments   Date Time Provider Department Center   3/13/2025 10:00 AM [facility] MAMMO1 [facility] MAMMO [facility]   3/13/2025 10:20 AM [facility] US2 [facility] US [facility]   4/9/2025 10:00 AM [facility] NURSE, [facility] FAMILY MEDICINE [facility] KATHRIN Puentes   1/23/2026  9:00 AM [facility] MAMMO2 [facility] MMIC [facility] [place]            Signature:  China Alvarez NP      [address] [place] [address]    Date of encounter: 3/10/25

## 2025-03-26 ENCOUNTER — HOSPITAL ENCOUNTER (OUTPATIENT)
Dept: RADIOLOGY | Facility: HOSPITAL | Age: 60
Discharge: HOME | End: 2025-03-26
Attending: RADIOLOGY
Payer: MEDICARE

## 2025-03-26 DIAGNOSIS — R92.8 ABNORMAL MAMMOGRAM: ICD-10-CM

## 2025-03-26 PROCEDURE — 77065 DX MAMMO INCL CAD UNI: CPT | Mod: TC,RT

## 2025-03-26 PROCEDURE — 76641 ULTRASOUND BREAST COMPLETE: CPT | Mod: 26,50,, | Performed by: RADIOLOGY

## 2025-03-26 PROCEDURE — 77065 DX MAMMO INCL CAD UNI: CPT | Mod: 26,RT,, | Performed by: RADIOLOGY

## 2025-03-26 PROCEDURE — 77061 BREAST TOMOSYNTHESIS UNI: CPT | Mod: 26,RT,, | Performed by: RADIOLOGY

## 2025-03-26 PROCEDURE — 76641 ULTRASOUND BREAST COMPLETE: CPT | Mod: TC,50

## 2025-04-02 ENCOUNTER — TELEPHONE (OUTPATIENT)
Dept: FAMILY MEDICINE | Facility: CLINIC | Age: 60
End: 2025-04-02
Payer: MEDICARE

## 2025-04-30 ENCOUNTER — RESULTS FOLLOW-UP (OUTPATIENT)
Dept: FAMILY MEDICINE | Facility: CLINIC | Age: 60
End: 2025-04-30